# Patient Record
Sex: MALE | Employment: FULL TIME | URBAN - METROPOLITAN AREA
[De-identification: names, ages, dates, MRNs, and addresses within clinical notes are randomized per-mention and may not be internally consistent; named-entity substitution may affect disease eponyms.]

---

## 2023-06-30 ENCOUNTER — APPOINTMENT (OUTPATIENT)
Dept: GENERAL RADIOLOGY | Age: 48
End: 2023-06-30
Payer: COMMERCIAL

## 2023-06-30 ENCOUNTER — HOSPITAL ENCOUNTER (EMERGENCY)
Age: 48
Discharge: HOME | End: 2023-06-30
Attending: EMERGENCY MEDICINE
Payer: COMMERCIAL

## 2023-06-30 VITALS
OXYGEN SATURATION: 99 % | WEIGHT: 165 LBS | HEART RATE: 91 BPM | HEIGHT: 68 IN | SYSTOLIC BLOOD PRESSURE: 168 MMHG | DIASTOLIC BLOOD PRESSURE: 98 MMHG | BODY MASS INDEX: 25.01 KG/M2 | RESPIRATION RATE: 14 BRPM | TEMPERATURE: 98.1 F

## 2023-06-30 DIAGNOSIS — R00.0 TACHYCARDIA: Primary | ICD-10-CM

## 2023-06-30 DIAGNOSIS — F41.1 ANXIETY STATE: ICD-10-CM

## 2023-06-30 LAB
ALBUMIN SERPL-MCNC: 4.8 G/DL (ref 3.4–5)
ALBUMIN/GLOB SERPL: 1.7 {RATIO} (ref 1.1–2.2)
ALP SERPL-CCNC: 58 U/L (ref 40–129)
ALT SERPL-CCNC: 20 U/L (ref 10–40)
ANION GAP SERPL CALCULATED.3IONS-SCNC: 12 MMOL/L (ref 3–16)
AST SERPL-CCNC: 20 U/L (ref 15–37)
BASOPHILS # BLD: 0.1 K/UL (ref 0–0.2)
BASOPHILS NFR BLD: 1.1 %
BILIRUB SERPL-MCNC: 0.3 MG/DL (ref 0–1)
BUN SERPL-MCNC: 10 MG/DL (ref 7–20)
CALCIUM SERPL-MCNC: 9.3 MG/DL (ref 8.3–10.6)
CHLORIDE SERPL-SCNC: 102 MMOL/L (ref 99–110)
CO2 SERPL-SCNC: 24 MMOL/L (ref 21–32)
CREAT SERPL-MCNC: 0.7 MG/DL (ref 0.9–1.3)
D DIMER: <0.27 UG/ML FEU (ref 0–0.6)
DEPRECATED RDW RBC AUTO: 13.4 % (ref 12.4–15.4)
EOSINOPHIL # BLD: 0.2 K/UL (ref 0–0.6)
EOSINOPHIL NFR BLD: 2.6 %
GFR SERPLBLD CREATININE-BSD FMLA CKD-EPI: >60 ML/MIN/{1.73_M2}
GLUCOSE SERPL-MCNC: 171 MG/DL (ref 70–99)
HCT VFR BLD AUTO: 44.5 % (ref 40.5–52.5)
HGB BLD-MCNC: 15.1 G/DL (ref 13.5–17.5)
LYMPHOCYTES # BLD: 3.1 K/UL (ref 1–5.1)
LYMPHOCYTES NFR BLD: 38.8 %
MCH RBC QN AUTO: 30.2 PG (ref 26–34)
MCHC RBC AUTO-ENTMCNC: 34 G/DL (ref 31–36)
MCV RBC AUTO: 88.8 FL (ref 80–100)
MONOCYTES # BLD: 0.5 K/UL (ref 0–1.3)
MONOCYTES NFR BLD: 5.9 %
NEUTROPHILS # BLD: 4.1 K/UL (ref 1.7–7.7)
NEUTROPHILS NFR BLD: 51.6 %
PLATELET # BLD AUTO: 257 K/UL (ref 135–450)
PMV BLD AUTO: 7.1 FL (ref 5–10.5)
POTASSIUM SERPL-SCNC: 3.8 MMOL/L (ref 3.5–5.1)
PROT SERPL-MCNC: 7.7 G/DL (ref 6.4–8.2)
RBC # BLD AUTO: 5 M/UL (ref 4.2–5.9)
SODIUM SERPL-SCNC: 138 MMOL/L (ref 136–145)
TROPONIN, HIGH SENSITIVITY: <6 NG/L (ref 0–22)
TROPONIN, HIGH SENSITIVITY: <6 NG/L (ref 0–22)
WBC # BLD AUTO: 8 K/UL (ref 4–11)

## 2023-06-30 PROCEDURE — 96374 THER/PROPH/DIAG INJ IV PUSH: CPT

## 2023-06-30 PROCEDURE — 36415 COLL VENOUS BLD VENIPUNCTURE: CPT

## 2023-06-30 PROCEDURE — 96361 HYDRATE IV INFUSION ADD-ON: CPT

## 2023-06-30 PROCEDURE — 6360000002 HC RX W HCPCS: Performed by: EMERGENCY MEDICINE

## 2023-06-30 PROCEDURE — 80053 COMPREHEN METABOLIC PANEL: CPT

## 2023-06-30 PROCEDURE — 85025 COMPLETE CBC W/AUTO DIFF WBC: CPT

## 2023-06-30 PROCEDURE — 85379 FIBRIN DEGRADATION QUANT: CPT

## 2023-06-30 PROCEDURE — 93005 ELECTROCARDIOGRAM TRACING: CPT | Performed by: EMERGENCY MEDICINE

## 2023-06-30 PROCEDURE — 99285 EMERGENCY DEPT VISIT HI MDM: CPT

## 2023-06-30 PROCEDURE — 71046 X-RAY EXAM CHEST 2 VIEWS: CPT

## 2023-06-30 PROCEDURE — 84484 ASSAY OF TROPONIN QUANT: CPT

## 2023-06-30 PROCEDURE — 2580000003 HC RX 258: Performed by: EMERGENCY MEDICINE

## 2023-06-30 RX ORDER — 0.9 % SODIUM CHLORIDE 0.9 %
1000 INTRAVENOUS SOLUTION INTRAVENOUS ONCE
Status: COMPLETED | OUTPATIENT
Start: 2023-06-30 | End: 2023-06-30

## 2023-06-30 RX ORDER — HYDROXYZINE PAMOATE 25 MG/1
25 CAPSULE ORAL 3 TIMES DAILY PRN
Qty: 30 CAPSULE | Refills: 0 | Status: SHIPPED | OUTPATIENT
Start: 2023-06-30 | End: 2023-07-14

## 2023-06-30 RX ORDER — LORAZEPAM 2 MG/ML
1 INJECTION INTRAMUSCULAR ONCE
Status: COMPLETED | OUTPATIENT
Start: 2023-06-30 | End: 2023-06-30

## 2023-06-30 RX ADMIN — LORAZEPAM 1 MG: 2 INJECTION INTRAMUSCULAR; INTRAVENOUS at 09:43

## 2023-06-30 RX ADMIN — SODIUM CHLORIDE 1000 ML: 9 INJECTION, SOLUTION INTRAVENOUS at 09:43

## 2023-06-30 ASSESSMENT — ENCOUNTER SYMPTOMS
PHOTOPHOBIA: 0
VOMITING: 0
NAUSEA: 0
ABDOMINAL PAIN: 0
WHEEZING: 0
BLOOD IN STOOL: 0
VOICE CHANGE: 0
SHORTNESS OF BREATH: 0
TROUBLE SWALLOWING: 0
BACK PAIN: 0
COLOR CHANGE: 0
STRIDOR: 0
FACIAL SWELLING: 0

## 2023-06-30 ASSESSMENT — PAIN - FUNCTIONAL ASSESSMENT
PAIN_FUNCTIONAL_ASSESSMENT: NONE - DENIES PAIN
PAIN_FUNCTIONAL_ASSESSMENT: NONE - DENIES PAIN

## 2023-07-01 LAB
EKG ATRIAL RATE: 118 BPM
EKG DIAGNOSIS: NORMAL
EKG P AXIS: 57 DEGREES
EKG P-R INTERVAL: 156 MS
EKG Q-T INTERVAL: 326 MS
EKG QRS DURATION: 92 MS
EKG QTC CALCULATION (BAZETT): 456 MS
EKG R AXIS: 40 DEGREES
EKG T AXIS: 43 DEGREES
EKG VENTRICULAR RATE: 118 BPM

## 2023-07-01 PROCEDURE — 93010 ELECTROCARDIOGRAM REPORT: CPT | Performed by: INTERNAL MEDICINE

## 2023-07-10 ENCOUNTER — TELEPHONE (OUTPATIENT)
Dept: PRIMARY CARE CLINIC | Age: 48
End: 2023-07-10

## 2023-11-04 ENCOUNTER — HOSPITAL ENCOUNTER (EMERGENCY)
Age: 48
Discharge: HOME | End: 2023-11-04
Payer: COMMERCIAL

## 2023-11-04 ENCOUNTER — APPOINTMENT (OUTPATIENT)
Dept: GENERAL RADIOLOGY | Age: 48
End: 2023-11-04
Payer: COMMERCIAL

## 2023-11-04 VITALS
WEIGHT: 150 LBS | TEMPERATURE: 98.3 F | RESPIRATION RATE: 10 BRPM | DIASTOLIC BLOOD PRESSURE: 97 MMHG | OXYGEN SATURATION: 99 % | BODY MASS INDEX: 23.54 KG/M2 | HEART RATE: 78 BPM | HEIGHT: 67 IN | SYSTOLIC BLOOD PRESSURE: 158 MMHG

## 2023-11-04 DIAGNOSIS — R00.0 TACHYCARDIA: ICD-10-CM

## 2023-11-04 DIAGNOSIS — I10 UNCONTROLLED HYPERTENSION: Primary | ICD-10-CM

## 2023-11-04 DIAGNOSIS — I49.3 UNIFOCAL PVCS: ICD-10-CM

## 2023-11-04 LAB
ALBUMIN SERPL-MCNC: 4.7 G/DL (ref 3.4–5)
ALBUMIN/GLOB SERPL: 1.5 {RATIO} (ref 1.1–2.2)
ALP SERPL-CCNC: 43 U/L (ref 40–129)
ALT SERPL-CCNC: 15 U/L (ref 10–40)
AMPHETAMINES UR QL SCN>1000 NG/ML: NORMAL
ANION GAP SERPL CALCULATED.3IONS-SCNC: 14 MMOL/L (ref 3–16)
AST SERPL-CCNC: 14 U/L (ref 15–37)
BARBITURATES UR QL SCN>200 NG/ML: NORMAL
BASOPHILS # BLD: 0.1 K/UL (ref 0–0.2)
BASOPHILS NFR BLD: 0.7 %
BENZODIAZ UR QL SCN>200 NG/ML: NORMAL
BILIRUB SERPL-MCNC: 0.7 MG/DL (ref 0–1)
BILIRUB UR QL STRIP.AUTO: NEGATIVE
BUN SERPL-MCNC: 14 MG/DL (ref 7–20)
CALCIUM SERPL-MCNC: 9.4 MG/DL (ref 8.3–10.6)
CANNABINOIDS UR QL SCN>50 NG/ML: NORMAL
CHLORIDE SERPL-SCNC: 98 MMOL/L (ref 99–110)
CLARITY UR: CLEAR
CO2 SERPL-SCNC: 23 MMOL/L (ref 21–32)
COCAINE UR QL SCN: NORMAL
COLOR UR: ABNORMAL
CREAT SERPL-MCNC: 0.8 MG/DL (ref 0.9–1.3)
D DIMER: <0.27 UG/ML FEU (ref 0–0.6)
DEPRECATED RDW RBC AUTO: 13.4 % (ref 12.4–15.4)
DRUG SCREEN COMMENT UR-IMP: NORMAL
EKG ATRIAL RATE: 118 BPM
EKG DIAGNOSIS: NORMAL
EKG P AXIS: 61 DEGREES
EKG P-R INTERVAL: 160 MS
EKG Q-T INTERVAL: 336 MS
EKG QRS DURATION: 94 MS
EKG QTC CALCULATION (BAZETT): 470 MS
EKG R AXIS: 52 DEGREES
EKG T AXIS: 27 DEGREES
EKG VENTRICULAR RATE: 118 BPM
EOSINOPHIL # BLD: 0 K/UL (ref 0–0.6)
EOSINOPHIL NFR BLD: 0.6 %
FENTANYL SCREEN, URINE: NORMAL
GFR SERPLBLD CREATININE-BSD FMLA CKD-EPI: >60 ML/MIN/{1.73_M2}
GLUCOSE SERPL-MCNC: 104 MG/DL (ref 70–99)
GLUCOSE UR STRIP.AUTO-MCNC: NEGATIVE MG/DL
HCT VFR BLD AUTO: 45 % (ref 40.5–52.5)
HGB BLD-MCNC: 15 G/DL (ref 13.5–17.5)
HGB UR QL STRIP.AUTO: NEGATIVE
INR PPP: 0.97 (ref 0.84–1.16)
KETONES UR STRIP.AUTO-MCNC: ABNORMAL MG/DL
LEUKOCYTE ESTERASE UR QL STRIP.AUTO: NEGATIVE
LYMPHOCYTES # BLD: 1.9 K/UL (ref 1–5.1)
LYMPHOCYTES NFR BLD: 24.3 %
MAGNESIUM SERPL-MCNC: 1.9 MG/DL (ref 1.8–2.4)
MCH RBC QN AUTO: 29.6 PG (ref 26–34)
MCHC RBC AUTO-ENTMCNC: 33.4 G/DL (ref 31–36)
MCV RBC AUTO: 88.4 FL (ref 80–100)
METHADONE UR QL SCN>300 NG/ML: NORMAL
MONOCYTES # BLD: 0.3 K/UL (ref 0–1.3)
MONOCYTES NFR BLD: 4.1 %
NEUTROPHILS # BLD: 5.4 K/UL (ref 1.7–7.7)
NEUTROPHILS NFR BLD: 70.3 %
NITRITE UR QL STRIP.AUTO: NEGATIVE
NT-PROBNP SERPL-MCNC: 43 PG/ML (ref 0–124)
OPIATES UR QL SCN>300 NG/ML: NORMAL
OXYCODONE UR QL SCN: NORMAL
PCP UR QL SCN>25 NG/ML: NORMAL
PH UR STRIP.AUTO: 6 [PH] (ref 5–8)
PH UR STRIP: 6 [PH]
PLATELET # BLD AUTO: 255 K/UL (ref 135–450)
PMV BLD AUTO: 7.3 FL (ref 5–10.5)
POTASSIUM SERPL-SCNC: 3.6 MMOL/L (ref 3.5–5.1)
PROT SERPL-MCNC: 7.8 G/DL (ref 6.4–8.2)
PROT UR STRIP.AUTO-MCNC: NEGATIVE MG/DL
PROTHROMBIN TIME: 12.9 SEC (ref 11.5–14.8)
RBC # BLD AUTO: 5.09 M/UL (ref 4.2–5.9)
SODIUM SERPL-SCNC: 135 MMOL/L (ref 136–145)
SP GR UR STRIP.AUTO: <=1.005 (ref 1–1.03)
T4 FREE SERPL-MCNC: 1.3 NG/DL (ref 0.9–1.8)
TROPONIN, HIGH SENSITIVITY: <6 NG/L (ref 0–22)
TSH SERPL DL<=0.005 MIU/L-ACNC: 1.01 UIU/ML (ref 0.27–4.2)
UA COMPLETE W REFLEX CULTURE PNL UR: ABNORMAL
UA DIPSTICK W REFLEX MICRO PNL UR: ABNORMAL
URN SPEC COLLECT METH UR: ABNORMAL
UROBILINOGEN UR STRIP-ACNC: 0.2 E.U./DL
WBC # BLD AUTO: 7.8 K/UL (ref 4–11)

## 2023-11-04 PROCEDURE — 93005 ELECTROCARDIOGRAM TRACING: CPT | Performed by: EMERGENCY MEDICINE

## 2023-11-04 PROCEDURE — 81003 URINALYSIS AUTO W/O SCOPE: CPT

## 2023-11-04 PROCEDURE — 84443 ASSAY THYROID STIM HORMONE: CPT

## 2023-11-04 PROCEDURE — 85610 PROTHROMBIN TIME: CPT

## 2023-11-04 PROCEDURE — 80053 COMPREHEN METABOLIC PANEL: CPT

## 2023-11-04 PROCEDURE — 85025 COMPLETE CBC W/AUTO DIFF WBC: CPT

## 2023-11-04 PROCEDURE — 99285 EMERGENCY DEPT VISIT HI MDM: CPT

## 2023-11-04 PROCEDURE — 83880 ASSAY OF NATRIURETIC PEPTIDE: CPT

## 2023-11-04 PROCEDURE — 6370000000 HC RX 637 (ALT 250 FOR IP): Performed by: PHYSICIAN ASSISTANT

## 2023-11-04 PROCEDURE — 2580000003 HC RX 258: Performed by: PHYSICIAN ASSISTANT

## 2023-11-04 PROCEDURE — 85379 FIBRIN DEGRADATION QUANT: CPT

## 2023-11-04 PROCEDURE — 71045 X-RAY EXAM CHEST 1 VIEW: CPT

## 2023-11-04 PROCEDURE — 83735 ASSAY OF MAGNESIUM: CPT

## 2023-11-04 PROCEDURE — 93010 ELECTROCARDIOGRAM REPORT: CPT | Performed by: INTERNAL MEDICINE

## 2023-11-04 PROCEDURE — 84439 ASSAY OF FREE THYROXINE: CPT

## 2023-11-04 PROCEDURE — 84484 ASSAY OF TROPONIN QUANT: CPT

## 2023-11-04 PROCEDURE — 80307 DRUG TEST PRSMV CHEM ANLYZR: CPT

## 2023-11-04 RX ORDER — METOPROLOL SUCCINATE 50 MG/1
50 TABLET, EXTENDED RELEASE ORAL EVERY EVENING
Qty: 30 TABLET | Refills: 0 | Status: SHIPPED | OUTPATIENT
Start: 2023-11-04

## 2023-11-04 RX ORDER — 0.9 % SODIUM CHLORIDE 0.9 %
1000 INTRAVENOUS SOLUTION INTRAVENOUS ONCE
Status: COMPLETED | OUTPATIENT
Start: 2023-11-04 | End: 2023-11-04

## 2023-11-04 RX ADMIN — SODIUM CHLORIDE 1000 ML: 9 INJECTION, SOLUTION INTRAVENOUS at 09:11

## 2023-11-04 RX ADMIN — METOPROLOL TARTRATE 50 MG: 25 TABLET, FILM COATED ORAL at 09:12

## 2023-11-04 ASSESSMENT — PAIN - FUNCTIONAL ASSESSMENT: PAIN_FUNCTIONAL_ASSESSMENT: 0-10

## 2023-11-04 ASSESSMENT — PAIN SCALES - GENERAL: PAINLEVEL_OUTOF10: 0

## 2023-11-04 NOTE — ED PROVIDER NOTES
[address] [place] [address]  ED  EMERGENCY DEPARTMENT ENCOUNTER        Pt Name: Nahun Mock  MRN: [id number]  [address] [facility] [place] 1975  Date of evaluation: 11/4/2023  Provider: Aleksandra Sierra PA-C  PCP: No primary care provider on file. Note Started: 9:15 AM EDT 11/4/23      HANANE. I have evaluated this patient. CHIEF COMPLAINT       Chief Complaint   Patient presents with    Tachycardia     Started \"not feel so good\" when got to work this morning, upset stomach, has been seen in ER for same thing but never cardiology        HISTORY OF PRESENT ILLNESS: 1 or more Elements     History From: Patient    Nahun Mock is a 46 y.o. male who presents to the emergency department with complaint tachycardia. The patient has had tachycardia in the past.  The patient works at [employer]. Starts work at 2 AM.  It was 7 AM or about 2 and half hours before coming to ED that he noticed increased heart rate. There was no flip-flop of the heart beat but simply tachycardia as reported by the patient. He states it might take his breath away when its fast.  Patient does have known history of tachycardia syndromes as well as hypertension. He does not follow with cardiology or PCP. He is not treated with medication at this time. He reports no chest pain or shortness of breath. No gastrointestinal or urinary complaints. The patient's initial heart rate is 120. It seems to vary between 100 up to 120. The patient initial BP is 175/97 at a recheck 180/101. Patient does report having seen a cardiologist in [facility] with a negative echo. I do find the echo study date 8/25/2011. I do see the referring provider as Alex rBush NP. I do not find any notes specific to an office visit. Nursing Notes were all reviewed and agreed with or any disagreements were addressed in the HPI. REVIEW OF SYSTEMS :      Review of Systems    Positives and Pertinent negatives as per HPI.      SURGICAL HISTORY included for SEP-1 Core Measure due to: Infection is not suspected    Chronic Conditions affecting care:    has no past medical history on file. CONSULTS: (Who and What was discussed)  None    Records Reviewed (External and Source) none    CC/HPI Summary, DDx, ED Course, and Reassessment:     Patient presenting from work. Works at [employer]. Increased heart rate. Presenting with 120. Blood pressure elevated. Patient with history of both and previously seen for both. Referred to PCP but did not go. Not on medication currently. I did obtain laboratory studies showing UDS negative, UA negative, CBC showing a WBC of 7.8 hemoglobin 15.5. Troponin, BNP and D-dimer not elevated. Renal function normal.  Chest x-ray showing no acute cardiopulmonary abnormality. Patient's TFTs are pending at this time. EKG showing sinus tachycardia otherwise no acute pathology appreciated. The patient did receive metoprolol tartrate 50 mg. Heart rate at discharge 78. Blood pressure at discharge 158/97. Disposition Considerations (tests considered but not done, Admit vs D/C, Shared Decision Making, Pt Expectation of Test or Tx.):     Patient safe for discharged home with diagnosis of tachycardia, PVCs and uncontrolled hypertension. Patient was given metoprolol tartrate 50 mg. Patient's symptoms did improve. Patient feeling improved from a clinical standpoint. He will be discharged to home. He will follow-up with cardiology Dr. Frances Morales. He will also be followed by PCP within the [facility] system. He lives in [facility]. I did send prescription to [facility] for Toprol-XL 50 mg #30x0. Patient is aware that he needs to continue with treatment. He does indicate father history hypertension. I am the Primary Clinician of Record. FINAL IMPRESSION      1. Uncontrolled hypertension    2. Tachycardia    3.  Unifocal PVCs          DISPOSITION/PLAN     DISPOSITION Decision To

## 2023-11-04 NOTE — DISCHARGE INSTRUCTIONS
You did present with initial heart rate of 120. You had some occasional PVCs at 1 point bigeminal PVCs. Initial blood pressure 168/103. Toward discharge 158/97. Heart rate at discharge 78. I did give you metoprolol titrate 50 mg.  I have sent prescription to your local pharmacy for metoprolol succinate 50 mg take 1 each evening beginning tonight. You will need to see cardiology Dr. Theo Chao for further evaluation as well as a PCP or primary care provider.

## 2023-11-07 ENCOUNTER — HOSPITAL ENCOUNTER (EMERGENCY)
Age: 48
Discharge: HOME | End: 2023-11-07
Attending: EMERGENCY MEDICINE
Payer: COMMERCIAL

## 2023-11-07 VITALS
RESPIRATION RATE: 18 BRPM | SYSTOLIC BLOOD PRESSURE: 172 MMHG | OXYGEN SATURATION: 99 % | TEMPERATURE: 98.4 F | DIASTOLIC BLOOD PRESSURE: 94 MMHG | HEART RATE: 76 BPM

## 2023-11-07 DIAGNOSIS — R20.0 NUMBNESS OF RIGHT FOOT: ICD-10-CM

## 2023-11-07 DIAGNOSIS — F41.1 ANXIETY STATE: ICD-10-CM

## 2023-11-07 DIAGNOSIS — R01.1 CARDIAC MURMUR: ICD-10-CM

## 2023-11-07 DIAGNOSIS — R00.2 PALPITATIONS: Primary | ICD-10-CM

## 2023-11-07 DIAGNOSIS — I49.8 BIGEMINY: ICD-10-CM

## 2023-11-07 LAB
ALBUMIN SERPL-MCNC: 4.6 G/DL (ref 3.4–5)
ALBUMIN/GLOB SERPL: 1.3 {RATIO} (ref 1.1–2.2)
ALP SERPL-CCNC: 47 U/L (ref 40–129)
ALT SERPL-CCNC: 12 U/L (ref 10–40)
ANION GAP SERPL CALCULATED.3IONS-SCNC: 13 MMOL/L (ref 3–16)
AST SERPL-CCNC: 16 U/L (ref 15–37)
BASOPHILS # BLD: 0.1 K/UL (ref 0–0.2)
BASOPHILS NFR BLD: 0.8 %
BILIRUB SERPL-MCNC: 1.1 MG/DL (ref 0–1)
BUN SERPL-MCNC: 8 MG/DL (ref 7–20)
CALCIUM SERPL-MCNC: 9.6 MG/DL (ref 8.3–10.6)
CHLORIDE SERPL-SCNC: 101 MMOL/L (ref 99–110)
CO2 SERPL-SCNC: 26 MMOL/L (ref 21–32)
CREAT SERPL-MCNC: 0.7 MG/DL (ref 0.9–1.3)
DEPRECATED RDW RBC AUTO: 13.2 % (ref 12.4–15.4)
EOSINOPHIL # BLD: 0.1 K/UL (ref 0–0.6)
EOSINOPHIL NFR BLD: 0.8 %
GFR SERPLBLD CREATININE-BSD FMLA CKD-EPI: >60 ML/MIN/{1.73_M2}
GLUCOSE SERPL-MCNC: 103 MG/DL (ref 70–99)
HCT VFR BLD AUTO: 43.4 % (ref 40.5–52.5)
HGB BLD-MCNC: 14.4 G/DL (ref 13.5–17.5)
LYMPHOCYTES # BLD: 2.1 K/UL (ref 1–5.1)
LYMPHOCYTES NFR BLD: 25.7 %
MAGNESIUM SERPL-MCNC: 2.1 MG/DL (ref 1.8–2.4)
MCH RBC QN AUTO: 29.4 PG (ref 26–34)
MCHC RBC AUTO-ENTMCNC: 33.2 G/DL (ref 31–36)
MCV RBC AUTO: 88.7 FL (ref 80–100)
MONOCYTES # BLD: 0.5 K/UL (ref 0–1.3)
MONOCYTES NFR BLD: 6.3 %
NEUTROPHILS # BLD: 5.5 K/UL (ref 1.7–7.7)
NEUTROPHILS NFR BLD: 66.4 %
NT-PROBNP SERPL-MCNC: 116 PG/ML (ref 0–124)
PLATELET # BLD AUTO: 294 K/UL (ref 135–450)
PMV BLD AUTO: 7.9 FL (ref 5–10.5)
POTASSIUM SERPL-SCNC: 4 MMOL/L (ref 3.5–5.1)
PROT SERPL-MCNC: 8.2 G/DL (ref 6.4–8.2)
RBC # BLD AUTO: 4.89 M/UL (ref 4.2–5.9)
SODIUM SERPL-SCNC: 140 MMOL/L (ref 136–145)
TROPONIN, HIGH SENSITIVITY: 6 NG/L (ref 0–22)
TROPONIN, HIGH SENSITIVITY: <6 NG/L (ref 0–22)
WBC # BLD AUTO: 8.2 K/UL (ref 4–11)

## 2023-11-07 PROCEDURE — 84484 ASSAY OF TROPONIN QUANT: CPT

## 2023-11-07 PROCEDURE — 83735 ASSAY OF MAGNESIUM: CPT

## 2023-11-07 PROCEDURE — 80053 COMPREHEN METABOLIC PANEL: CPT

## 2023-11-07 PROCEDURE — 6370000000 HC RX 637 (ALT 250 FOR IP): Performed by: EMERGENCY MEDICINE

## 2023-11-07 PROCEDURE — 83880 ASSAY OF NATRIURETIC PEPTIDE: CPT

## 2023-11-07 PROCEDURE — 99284 EMERGENCY DEPT VISIT MOD MDM: CPT

## 2023-11-07 PROCEDURE — 93005 ELECTROCARDIOGRAM TRACING: CPT | Performed by: EMERGENCY MEDICINE

## 2023-11-07 PROCEDURE — 85025 COMPLETE CBC W/AUTO DIFF WBC: CPT

## 2023-11-07 RX ORDER — HYDROXYZINE PAMOATE 25 MG/1
50 CAPSULE ORAL ONCE
Status: COMPLETED | OUTPATIENT
Start: 2023-11-07 | End: 2023-11-07

## 2023-11-07 RX ORDER — HYDROXYZINE PAMOATE 25 MG/1
25 CAPSULE ORAL
Qty: 5 CAPSULE | Refills: 0 | Status: SHIPPED | OUTPATIENT
Start: 2023-11-07 | End: 2023-11-09 | Stop reason: ALTCHOICE

## 2023-11-07 RX ADMIN — HYDROXYZINE PAMOATE 50 MG: 25 CAPSULE ORAL at 18:26

## 2023-11-07 ASSESSMENT — PAIN - FUNCTIONAL ASSESSMENT: PAIN_FUNCTIONAL_ASSESSMENT: 0-10

## 2023-11-07 ASSESSMENT — PAIN SCALES - GENERAL: PAINLEVEL_OUTOF10: 0

## 2023-11-07 NOTE — ED PROVIDER NOTES
MEDICATIONS:  Discharge Medication List as of 11/7/2023  8:08 PM                 (Please note that portions of this note were completed with a voicerecognition program.  Efforts were made to edit the dictations but occasionally words are mis-transcribed.)    Xiang Varela MD (electronically signed)            Xiang Varela MD  11/08/23 [id number]

## 2023-11-08 LAB
EKG ATRIAL RATE: 93 BPM
EKG DIAGNOSIS: NORMAL
EKG P AXIS: 71 DEGREES
EKG P-R INTERVAL: 134 MS
EKG Q-T INTERVAL: 376 MS
EKG QRS DURATION: 96 MS
EKG QTC CALCULATION (BAZETT): 467 MS
EKG R AXIS: 75 DEGREES
EKG T AXIS: 70 DEGREES
EKG VENTRICULAR RATE: 93 BPM

## 2023-11-08 PROCEDURE — 93010 ELECTROCARDIOGRAM REPORT: CPT | Performed by: INTERNAL MEDICINE

## 2023-11-08 ASSESSMENT — ENCOUNTER SYMPTOMS
SHORTNESS OF BREATH: 0
ABDOMINAL PAIN: 0
VOMITING: 0
NAUSEA: 0
CHEST TIGHTNESS: 0
COUGH: 0
BACK PAIN: 0

## 2023-11-08 ASSESSMENT — SOCIAL DETERMINANTS OF HEALTH (SDOH)

## 2023-11-08 NOTE — ED NOTES
Discharge instructions reviewed with Pt. Pt verbalizes understanding at this time. Prescriptions/medications reviewed with pt at this time. VS as noted. Pt condition stable at this time. No concerns voiced.        Ata Baez RN  11/07/23 2012

## 2023-11-08 NOTE — DISCHARGE INSTRUCTIONS
Continue taking metoprolol as prescribed. Stay hydrated. Call cardiology tomorrow for further evaluation. Keep your primary doctor appointment in 2 days as scheduled. See if cardiology can move up your appointment to this week. Return to the emergency department for associated lightheadedness with feeling like you could pass out, development of chest pain with shortness of breath, any new numbness or weakness on one side of the body, or any other concerns over the next 12 to 24 hours. Make sure to see about getting a Holter monitor for your palpitations along with an echocardiogram due to concern for heart murmur.

## 2023-11-09 ENCOUNTER — OFFICE VISIT (OUTPATIENT)
Dept: FAMILY MEDICINE CLINIC | Age: 48
End: 2023-11-09
Payer: COMMERCIAL

## 2023-11-09 VITALS
HEIGHT: 67 IN | DIASTOLIC BLOOD PRESSURE: 97 MMHG | OXYGEN SATURATION: 99 % | HEART RATE: 77 BPM | WEIGHT: 153.4 LBS | SYSTOLIC BLOOD PRESSURE: 166 MMHG | BODY MASS INDEX: 24.08 KG/M2

## 2023-11-09 DIAGNOSIS — I10 HYPERTENSION, UNSPECIFIED TYPE: ICD-10-CM

## 2023-11-09 DIAGNOSIS — Z12.11 COLON CANCER SCREENING: ICD-10-CM

## 2023-11-09 DIAGNOSIS — F41.9 ANXIETY: Primary | ICD-10-CM

## 2023-11-09 DIAGNOSIS — I47.9 TACHYCARDIA, PAROXYSMAL (HCC): ICD-10-CM

## 2023-11-09 LAB
EKG ATRIAL RATE: 88 BPM
EKG DIAGNOSIS: NORMAL
EKG P AXIS: 69 DEGREES
EKG P-R INTERVAL: 134 MS
EKG Q-T INTERVAL: 386 MS
EKG QRS DURATION: 94 MS
EKG QTC CALCULATION (BAZETT): 467 MS
EKG R AXIS: 73 DEGREES
EKG T AXIS: 56 DEGREES
EKG VENTRICULAR RATE: 88 BPM

## 2023-11-09 PROCEDURE — 93010 ELECTROCARDIOGRAM REPORT: CPT | Performed by: INTERNAL MEDICINE

## 2023-11-09 PROCEDURE — 99204 OFFICE O/P NEW MOD 45 MIN: CPT | Performed by: SURGERY

## 2023-11-09 PROCEDURE — 3077F SYST BP >= 140 MM HG: CPT | Performed by: SURGERY

## 2023-11-09 PROCEDURE — 3080F DIAST BP >= 90 MM HG: CPT | Performed by: SURGERY

## 2023-11-09 RX ORDER — ESCITALOPRAM OXALATE 10 MG/1
10 TABLET ORAL DAILY
Qty: 30 TABLET | Refills: 0 | Status: SHIPPED | OUTPATIENT
Start: 2023-11-09

## 2023-11-09 SDOH — ECONOMIC STABILITY: INCOME INSECURITY: HOW HARD IS IT FOR YOU TO PAY FOR THE VERY BASICS LIKE FOOD, HOUSING, MEDICAL CARE, AND HEATING?: NOT HARD AT ALL

## 2023-11-09 SDOH — ECONOMIC STABILITY: HOUSING INSECURITY
IN THE LAST 12 MONTHS, WAS THERE A TIME WHEN YOU DID NOT HAVE A STEADY PLACE TO SLEEP OR SLEPT IN A SHELTER (INCLUDING NOW)?: NO

## 2023-11-09 SDOH — ECONOMIC STABILITY: FOOD INSECURITY: WITHIN THE PAST 12 MONTHS, YOU WORRIED THAT YOUR FOOD WOULD RUN OUT BEFORE YOU GOT MONEY TO BUY MORE.: NEVER TRUE

## 2023-11-09 SDOH — ECONOMIC STABILITY: FOOD INSECURITY: WITHIN THE PAST 12 MONTHS, THE FOOD YOU BOUGHT JUST DIDN'T LAST AND YOU DIDN'T HAVE MONEY TO GET MORE.: NEVER TRUE

## 2023-11-09 ASSESSMENT — ENCOUNTER SYMPTOMS
COUGH: 0
SHORTNESS OF BREATH: 0
ABDOMINAL PAIN: 0
DIARRHEA: 0
NAUSEA: 0
CONSTIPATION: 0
SORE THROAT: 0

## 2023-11-09 ASSESSMENT — PATIENT HEALTH QUESTIONNAIRE - PHQ9
1. LITTLE INTEREST OR PLEASURE IN DOING THINGS: 0
SUM OF ALL RESPONSES TO PHQ QUESTIONS 1-9: 0
SUM OF ALL RESPONSES TO PHQ QUESTIONS 1-9: 0
SUM OF ALL RESPONSES TO PHQ9 QUESTIONS 1 & 2: 0
SUM OF ALL RESPONSES TO PHQ QUESTIONS 1-9: 0
SUM OF ALL RESPONSES TO PHQ QUESTIONS 1-9: 0
2. FEELING DOWN, DEPRESSED OR HOPELESS: 0

## 2023-11-09 NOTE — PROGRESS NOTES
his trigger for tachy episodes  - he has a lot of anxiety  - we will trial lexapro due to the mild side effect profile and minimal dosing range    Tachycardia and HTN  - keep appointment with cardio on Monday  - continue metoprolol    F/u in 1 month    Orders Placed This Encounter   Procedures    COLONOSCOPY W/ OR W/O BIOPSY     Order Specific Question:   Screening or Diagnostic? Answer:   Screening       Orders Placed This Encounter   Medications    escitalopram (LEXAPRO) 10 MG tablet     Sig: Take 1 tablet by mouth daily     Dispense:  30 tablet     Refill:  0       Return in about 1 month (around 12/9/2023).     [employer]), DO

## 2023-11-10 PROBLEM — I10 HYPERTENSION: Status: ACTIVE | Noted: 2023-11-10

## 2023-11-10 PROBLEM — K76.0 FATTY LIVER: Status: ACTIVE | Noted: 2023-11-10

## 2023-11-10 PROBLEM — R00.0 TACHYCARDIA: Status: ACTIVE | Noted: 2023-11-10

## 2023-11-10 NOTE — PROGRESS NOTES
CARDIOLOGY CONSULTATION      Patient Name: Al Fierro  Primary Care physician: No primary care provider on file. Reason for Referral/Chief Complaint: Al Fierro is a 46 y.o. patient who is referred to cardiology clinic today for evaluation and treatment of palpitations. Chief Complaint   Patient presents with    New Patient    Hypertension    Fatigue      History of Present Illness:   Al Fierro is a 46 y.o. male with no significant medical history. Patient is referred to clinic after he presented to [facility] 11/4/23 and 11/7/23, for tachycardia and palpitations. He stated it will take his breath away when its fast.  He does report working from 2 AM to 10 AM on a regular basis and whenever he gets home he feels like the palpitations are worse. He was unable to sleep due to not feeling the greatest related to the palpitations. Initial heart rate was 120. With an average between 100-120. Initial blood pressure 175/97 with a recheck 180/101. Chest Xray showed no acute cardiopulmonary disease. Patient does have a known history of tachycardia as well as hypertension. Patient was started on Toprol XL 50 mg daily. Today, he states he is feeling tired. He states when he is working he has to take breaks because he is really fatigued. He states he does have anxiety. He did not notice associated symptoms when he had the heart palpitation and states he has been more fatigued since his first ED visit 11/4/23  and since starting beta blocker. Denies smoking, alcohol use or recreational drug use. Father had a CABG-50-60s? The patient denies chest pain, shortness of breath at rest and dyspnea with exertion. Denies palpitations, dizziness, near-syncope or mary anne syncope. Denies paroxysmal nocturnal dyspnea, orthopnea, bendopnea, increasing lower extremity edema or weight gain. The patient endorses highest level of activity as working at [employer].      Past Medical History:   has no past

## 2023-11-13 ENCOUNTER — TELEPHONE (OUTPATIENT)
Dept: CARDIOLOGY CLINIC | Age: 48
End: 2023-11-13

## 2023-11-13 ENCOUNTER — OFFICE VISIT (OUTPATIENT)
Dept: CARDIOLOGY CLINIC | Age: 48
End: 2023-11-13

## 2023-11-13 VITALS
WEIGHT: 155.2 LBS | HEIGHT: 67 IN | OXYGEN SATURATION: 98 % | DIASTOLIC BLOOD PRESSURE: 60 MMHG | BODY MASS INDEX: 24.36 KG/M2 | HEART RATE: 45 BPM | SYSTOLIC BLOOD PRESSURE: 110 MMHG

## 2023-11-13 DIAGNOSIS — R00.0 TACHYCARDIA: ICD-10-CM

## 2023-11-13 DIAGNOSIS — I15.9 SECONDARY HYPERTENSION: ICD-10-CM

## 2023-11-13 DIAGNOSIS — I49.9 IRREGULAR HEARTBEAT: Primary | ICD-10-CM

## 2023-11-13 DIAGNOSIS — K76.0 FATTY LIVER: ICD-10-CM

## 2023-11-13 DIAGNOSIS — I49.8 BIGEMINY: ICD-10-CM

## 2023-11-13 RX ORDER — DILTIAZEM HYDROCHLORIDE 120 MG/1
120 CAPSULE, COATED, EXTENDED RELEASE ORAL DAILY
Qty: 90 CAPSULE | Refills: 3 | Status: SHIPPED | OUTPATIENT
Start: 2023-11-13

## 2023-11-13 NOTE — PATIENT INSTRUCTIONS
Plan  Monitor your blood pressure at home twice a day, morning and night. Recommend a monitor for your bicep. Keep a log. Goal 130/80 or less. Recommend an echocardiogram which is an ultrasound of your heart to evaluate heart function, structures and valves. Stop taking your Metoprolol Succinate (Toprol XL). Start taking Diltiazem (Cardizem CD) 120 mg daily. This medication is used to treat angina, high blood pressure and irregular heart rates. Call the office should you experience any symptoms. We placed a two week VC to evaluate irregularities in your heart rate. Continue to hold you Lexapro. You can discuss this with your primary car provider. We will call you with the results. Follow up with me in     Your provider has ordered testing for further evaluation. An order/prescription has been included in your paper work. To schedule outpatient testing, contact Central Scheduling by calling -[facility] ([phone]).

## 2023-11-13 NOTE — TELEPHONE ENCOUNTER
Monitor placed by Michael [facility] [employer]  Length of [id number] days  Monitor ordered by Dr. Paul Areas  Phone [phone] [phone]  Activation successful prior to pt leaving office?  Yes

## 2023-12-07 ENCOUNTER — OFFICE VISIT (OUTPATIENT)
Dept: FAMILY MEDICINE CLINIC | Age: 48
End: 2023-12-07
Payer: COMMERCIAL

## 2023-12-07 VITALS
DIASTOLIC BLOOD PRESSURE: 84 MMHG | SYSTOLIC BLOOD PRESSURE: 185 MMHG | BODY MASS INDEX: 23.57 KG/M2 | HEIGHT: 67 IN | OXYGEN SATURATION: 98 % | WEIGHT: 150.2 LBS | HEART RATE: 85 BPM

## 2023-12-07 DIAGNOSIS — F41.9 ANXIETY: Primary | ICD-10-CM

## 2023-12-07 PROCEDURE — 99213 OFFICE O/P EST LOW 20 MIN: CPT | Performed by: SURGERY

## 2023-12-07 PROCEDURE — 3079F DIAST BP 80-89 MM HG: CPT | Performed by: SURGERY

## 2023-12-07 PROCEDURE — 3077F SYST BP >= 140 MM HG: CPT | Performed by: SURGERY

## 2023-12-07 RX ORDER — DULOXETIN HYDROCHLORIDE 30 MG/1
30 CAPSULE, DELAYED RELEASE ORAL DAILY
Qty: 90 CAPSULE | Refills: 1 | Status: SHIPPED | OUTPATIENT
Start: 2023-12-07

## 2023-12-07 ASSESSMENT — ENCOUNTER SYMPTOMS
ABDOMINAL PAIN: 0
NAUSEA: 0
DIARRHEA: 0
SORE THROAT: 0
SHORTNESS OF BREATH: 0
COUGH: 0
CONSTIPATION: 0

## 2023-12-07 NOTE — PROGRESS NOTES
12/7/2023    This is a 46 y.o. male   Chief Complaint   Patient presents with    Follow-up    Anxiety   . Here for f/u. Saw cardio after last visit and they told him not to take his anxiety medication. So he hasn't been using the lexapro, took it one time. We know his anxiety is a huge trigger for his symptoms - elevated BP and tachycardia when stresses or presented with new situations. We can change his medication to a less potential for QT prolongation while his cardiac workup remains underway. 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 He cut out caffeine completely since our last visit. Patient Active Problem List   Diagnosis    Fatty liver    Tachycardia    Hypertension    Anxiety       Current Outpatient Medications   Medication Sig Dispense Refill    DULoxetine (CYMBALTA) 30 MG extended release capsule Take 1 capsule by mouth daily 90 capsule 1    dilTIAZem (CARDIZEM CD) 120 MG extended release capsule Take 1 capsule by mouth daily 90 capsule 3     No current facility-administered medications for this visit. No Known Allergies    Social History     Tobacco Use    Smoking status: Never     Passive exposure: Never    Smokeless tobacco: Not on file   Substance Use Topics    Alcohol use: Not Currently     Comment: socially       Review of Systems   Constitutional:  Negative for chills and fever. HENT:  Negative for congestion, ear pain and sore throat. Respiratory:  Negative for cough and shortness of breath. Cardiovascular:  Positive for palpitations (with stress). Negative for chest pain and leg swelling. Gastrointestinal:  Negative for abdominal pain, constipation, diarrhea and nausea. Genitourinary:  Negative for difficulty urinating and hematuria. Neurological:  Negative for dizziness, tremors, syncope, numbness and headaches.

## 2023-12-11 ENCOUNTER — PROCEDURE VISIT (OUTPATIENT)
Dept: CARDIOLOGY CLINIC | Age: 48
End: 2023-12-11
Payer: COMMERCIAL

## 2023-12-11 DIAGNOSIS — I49.8 BIGEMINY: ICD-10-CM

## 2023-12-11 PROCEDURE — 93306 TTE W/DOPPLER COMPLETE: CPT | Performed by: INTERNAL MEDICINE

## 2023-12-15 ENCOUNTER — TELEPHONE (OUTPATIENT)
Dept: FAMILY MEDICINE CLINIC | Age: 48
End: 2023-12-15

## 2023-12-15 NOTE — TELEPHONE ENCOUNTER
Pt called wanting to speak with Dr Marin regarding some side effects he is having since starting the new dose of Cymbalta a week ago.     Sx: nausea, tired, no appetite    Please call back to discuss.

## 2023-12-26 ENCOUNTER — TELEPHONE (OUTPATIENT)
Dept: CARDIOLOGY CLINIC | Age: 48
End: 2023-12-26

## 2023-12-26 NOTE — TELEPHONE ENCOUNTER
----- Message from Slava Osborn, DO sent at 12/26/2023  9:51 AM EST -----  Please let patient know that his event monitor showed sinus rhythm with low PVC burden. The frequency appears much less than what see in the office last visit. This is likely from the diltiazem we started at last visit. He will need to have a HENRIETTA that we discussed to assess the degree of leakiness of his mitral valve, scheduled on 1/12/23 at 0800. Thanks.  AMP

## 2023-12-28 NOTE — TELEPHONE ENCOUNTER
Third and final attempt made to reach the patient to relay results message from AMP. Letter created and mailed to the patient. VM left with office number should patient call back. Closing encounter.

## 2024-01-12 ENCOUNTER — ANESTHESIA EVENT (OUTPATIENT)
Dept: CARDIAC CATH/INVASIVE PROCEDURES | Age: 49
End: 2024-01-12
Payer: COMMERCIAL

## 2024-01-12 ENCOUNTER — ANESTHESIA (OUTPATIENT)
Dept: CARDIAC CATH/INVASIVE PROCEDURES | Age: 49
End: 2024-01-12
Payer: COMMERCIAL

## 2024-01-12 ENCOUNTER — HOSPITAL ENCOUNTER (OUTPATIENT)
Dept: CARDIAC CATH/INVASIVE PROCEDURES | Age: 49
Discharge: HOME | End: 2024-01-12
Attending: STUDENT IN AN ORGANIZED HEALTH CARE EDUCATION/TRAINING PROGRAM | Admitting: STUDENT IN AN ORGANIZED HEALTH CARE EDUCATION/TRAINING PROGRAM
Payer: COMMERCIAL

## 2024-01-12 VITALS
OXYGEN SATURATION: 99 % | HEIGHT: 68 IN | HEART RATE: 92 BPM | SYSTOLIC BLOOD PRESSURE: 142 MMHG | RESPIRATION RATE: 17 BRPM | WEIGHT: 158 LBS | BODY MASS INDEX: 23.95 KG/M2 | DIASTOLIC BLOOD PRESSURE: 93 MMHG

## 2024-01-12 DIAGNOSIS — I34.1 MITRAL VALVE PROLAPSE: ICD-10-CM

## 2024-01-12 LAB
ANION GAP SERPL CALCULATED.3IONS-SCNC: 11 MMOL/L (ref 3–16)
BUN SERPL-MCNC: 9 MG/DL (ref 7–20)
CALCIUM SERPL-MCNC: 9.3 MG/DL (ref 8.3–10.6)
CHLORIDE SERPL-SCNC: 102 MMOL/L (ref 99–110)
CO2 SERPL-SCNC: 26 MMOL/L (ref 21–32)
CREAT SERPL-MCNC: 0.7 MG/DL (ref 0.9–1.3)
DEPRECATED RDW RBC AUTO: 13.1 % (ref 12.4–15.4)
EKG ATRIAL RATE: 86 BPM
EKG DIAGNOSIS: NORMAL
EKG P AXIS: 55 DEGREES
EKG P-R INTERVAL: 136 MS
EKG Q-T INTERVAL: 374 MS
EKG QRS DURATION: 92 MS
EKG QTC CALCULATION (BAZETT): 447 MS
EKG R AXIS: 56 DEGREES
EKG T AXIS: 39 DEGREES
EKG VENTRICULAR RATE: 86 BPM
GFR SERPLBLD CREATININE-BSD FMLA CKD-EPI: >60 ML/MIN/{1.73_M2}
GLUCOSE SERPL-MCNC: 101 MG/DL (ref 70–99)
HCT VFR BLD AUTO: 45.8 % (ref 40.5–52.5)
HGB BLD-MCNC: 15.4 G/DL (ref 13.5–17.5)
MCH RBC QN AUTO: 29.7 PG (ref 26–34)
MCHC RBC AUTO-ENTMCNC: 33.6 G/DL (ref 31–36)
MCV RBC AUTO: 88.4 FL (ref 80–100)
PLATELET # BLD AUTO: 289 K/UL (ref 135–450)
PMV BLD AUTO: 6.9 FL (ref 5–10.5)
POTASSIUM SERPL-SCNC: 3.8 MMOL/L (ref 3.5–5.1)
RBC # BLD AUTO: 5.19 M/UL (ref 4.2–5.9)
SODIUM SERPL-SCNC: 139 MMOL/L (ref 136–145)
WBC # BLD AUTO: 6.8 K/UL (ref 4–11)

## 2024-01-12 PROCEDURE — 3700000001 HC ADD 15 MINUTES (ANESTHESIA)

## 2024-01-12 PROCEDURE — 85027 COMPLETE CBC AUTOMATED: CPT

## 2024-01-12 PROCEDURE — 93325 DOPPLER ECHO COLOR FLOW MAPG: CPT

## 2024-01-12 PROCEDURE — 93315 ECHO TRANSESOPHAGEAL: CPT

## 2024-01-12 PROCEDURE — 2580000003 HC RX 258: Performed by: STUDENT IN AN ORGANIZED HEALTH CARE EDUCATION/TRAINING PROGRAM

## 2024-01-12 PROCEDURE — 3700000000 HC ANESTHESIA ATTENDED CARE

## 2024-01-12 PROCEDURE — 93005 ELECTROCARDIOGRAM TRACING: CPT | Performed by: INTERNAL MEDICINE

## 2024-01-12 PROCEDURE — 93010 ELECTROCARDIOGRAM REPORT: CPT | Performed by: INTERNAL MEDICINE

## 2024-01-12 PROCEDURE — 93320 DOPPLER ECHO COMPLETE: CPT

## 2024-01-12 PROCEDURE — 6360000002 HC RX W HCPCS: Performed by: NURSE ANESTHETIST, CERTIFIED REGISTERED

## 2024-01-12 PROCEDURE — 80048 BASIC METABOLIC PNL TOTAL CA: CPT

## 2024-01-12 RX ORDER — MEPERIDINE HYDROCHLORIDE 50 MG/ML
12.5 INJECTION INTRAMUSCULAR; INTRAVENOUS; SUBCUTANEOUS EVERY 5 MIN PRN
OUTPATIENT
Start: 2024-01-12

## 2024-01-12 RX ORDER — HYDROMORPHONE HYDROCHLORIDE 1 MG/ML
0.25 INJECTION, SOLUTION INTRAMUSCULAR; INTRAVENOUS; SUBCUTANEOUS EVERY 5 MIN PRN
OUTPATIENT
Start: 2024-01-12

## 2024-01-12 RX ORDER — ONDANSETRON 2 MG/ML
4 INJECTION INTRAMUSCULAR; INTRAVENOUS
OUTPATIENT
Start: 2024-01-12 | End: 2024-01-13

## 2024-01-12 RX ORDER — SODIUM CHLORIDE 9 MG/ML
INJECTION, SOLUTION INTRAVENOUS PRN
OUTPATIENT
Start: 2024-01-12

## 2024-01-12 RX ORDER — OXYCODONE HYDROCHLORIDE 5 MG/1
5 TABLET ORAL PRN
OUTPATIENT
Start: 2024-01-12 | End: 2024-01-12

## 2024-01-12 RX ORDER — LABETALOL HYDROCHLORIDE 5 MG/ML
10 INJECTION, SOLUTION INTRAVENOUS
OUTPATIENT
Start: 2024-01-12

## 2024-01-12 RX ORDER — SODIUM CHLORIDE 0.9 % (FLUSH) 0.9 %
5-40 SYRINGE (ML) INJECTION PRN
Status: DISCONTINUED | OUTPATIENT
Start: 2024-01-12 | End: 2024-01-12 | Stop reason: HOSPADM

## 2024-01-12 RX ORDER — SODIUM CHLORIDE 9 MG/ML
INJECTION, SOLUTION INTRAVENOUS PRN
Status: DISCONTINUED | OUTPATIENT
Start: 2024-01-12 | End: 2024-01-12 | Stop reason: HOSPADM

## 2024-01-12 RX ORDER — SODIUM CHLORIDE 0.9 % (FLUSH) 0.9 %
5-40 SYRINGE (ML) INJECTION EVERY 12 HOURS SCHEDULED
Status: DISCONTINUED | OUTPATIENT
Start: 2024-01-12 | End: 2024-01-12 | Stop reason: HOSPADM

## 2024-01-12 RX ORDER — SODIUM CHLORIDE 0.9 % (FLUSH) 0.9 %
5-40 SYRINGE (ML) INJECTION EVERY 12 HOURS SCHEDULED
OUTPATIENT
Start: 2024-01-12

## 2024-01-12 RX ORDER — OXYCODONE HYDROCHLORIDE 5 MG/1
10 TABLET ORAL PRN
OUTPATIENT
Start: 2024-01-12 | End: 2024-01-12

## 2024-01-12 RX ORDER — PROPOFOL 10 MG/ML
INJECTION, EMULSION INTRAVENOUS PRN
Status: DISCONTINUED | OUTPATIENT
Start: 2024-01-12 | End: 2024-01-12 | Stop reason: SDUPTHER

## 2024-01-12 RX ORDER — DIPHENHYDRAMINE HYDROCHLORIDE 50 MG/ML
12.5 INJECTION INTRAMUSCULAR; INTRAVENOUS
OUTPATIENT
Start: 2024-01-12 | End: 2024-01-13

## 2024-01-12 RX ORDER — SODIUM CHLORIDE 0.9 % (FLUSH) 0.9 %
5-40 SYRINGE (ML) INJECTION PRN
OUTPATIENT
Start: 2024-01-12

## 2024-01-12 RX ORDER — HYDROMORPHONE HYDROCHLORIDE 1 MG/ML
0.5 INJECTION, SOLUTION INTRAMUSCULAR; INTRAVENOUS; SUBCUTANEOUS EVERY 5 MIN PRN
OUTPATIENT
Start: 2024-01-12

## 2024-01-12 RX ADMIN — Medication 10 ML: at 08:19

## 2024-01-12 RX ADMIN — PROPOFOL 600 MG: 10 INJECTION, EMULSION INTRAVENOUS at 08:22

## 2024-01-12 RX ADMIN — SODIUM CHLORIDE: 9 INJECTION, SOLUTION INTRAVENOUS at 08:09

## 2024-01-12 NOTE — DISCHARGE INSTRUCTIONS
Cath Labs at  [facility]                      1/12/2024  Tomer Amaya   Date of Birth 1975     TRANSESOPHAGEAL ECHOCARDIOGRAM DISCHARGE INSTRUCTIONS      You have been given sedation for your procedure so you may not drive, operate any machinery, or sign any legal documentation for 24 hours.    Do not drink or eat for at least 2 hours AND until your gag reflex returns.  Check by touching the back of the tongue to be sure you can gag.  Do not drink any alcohol today.  Eat soft foods at first then gradually return to your normal diet.      CALL YOUR DOCTOR If you should develop a fever of 101, cough up teaspoon amounts of blood, develop severe shortness of breath or chest pain.  If symptoms are severe, go to the emergency room.    Contact your doctor for a follow-up visit and/or results of your procedure.      SEDATION DISCHARGE INSTRUCTION:  For the next 24 hours do not drive a car, operate machinery, power tools or kitchen appliances.  Do not drink alcohol; including beer or wine.  Do not make any important decisions or sign any important papers.  For the next 24 hours you can expect drowsiness, light-headed or dizziness, nausea/ vomiting, inability to concentrate, fatigue and desire to sleep.  We strongly suggest that a responsible adult be with for the next 24 hours, for your protection and safety.  You are not allowed to drive yourself home, or take any type of public transportation.  If any questions, please call your doctor.  If you cannot reach your Doctor then call the Emergency Department at  [phone].  Explain to the Emergency Department the procedure you had performed and they will be able to assist you.  If you seek Emergency Care, bring this form with you.    If your condition worsens or if you have any concerns, call your doctor or seek emergency medical services as needed. If you have any of the following symptoms/conditions, call your doctor.

## 2024-01-12 NOTE — ANESTHESIA PRE PROCEDURE
Department of Anesthesiology  Preprocedure Note       Name:  Tomer Amaya   Age:  48 y.o.  :  1975                                          MRN:  [id number]         Date:  2024      Surgeon: * Surgery not found *    Procedure:     Medications prior to admission:   Prior to Admission medications    Medication Sig Start Date End Date Taking? Authorizing Provider   DULoxetine (CYMBALTA) 30 MG extended release capsule Take 1 capsule by mouth daily  Patient not taking: Reported on 2024   Cristina Marin DO   dilTIAZem (CARDIZEM CD) 120 MG extended release capsule Take 1 capsule by mouth daily 23   Antonio Jackson DO       Current medications:    Current Facility-Administered Medications   Medication Dose Route Frequency Provider Last Rate Last Admin   • sodium chloride flush 0.9 % injection 5-40 mL  5-40 mL IntraVENous 2 times per day Antonio Jackson DO       • sodium chloride flush 0.9 % injection 5-40 mL  5-40 mL IntraVENous PRN Antonio Jackson DO       • 0.9 % sodium chloride infusion   IntraVENous PRN Antonio Jackson DO           Allergies:  No Known Allergies    Problem List:    Patient Active Problem List   Diagnosis Code   • Fatty liver K76.0   • Tachycardia R00.0   • Hypertension I10   • Anxiety F41.9       Past Medical History:  No past medical history on file.    Past Surgical History:  No past surgical history on file.    Social History:    Social History     Tobacco Use   • Smoking status: Never     Passive exposure: Never   • Smokeless tobacco: Not on file   Substance Use Topics   • Alcohol use: Not Currently     Comment: socially                                Counseling given: Not Answered      Vital Signs (Current):   Vitals:    24 0639   Weight: 71.7 kg (158 lb)   Height: 1.727 m (5' 8\")                                              BP Readings from Last 3 Encounters:   23 (!) 185/84   23 110/60   23 (!) 166/97       NPO

## 2024-01-12 NOTE — PERIOP NOTE
CATH LAB PROCEDURE LOG - TRANSESOPHAGEAL ECHOCARDIOGRAM    PRE Procedure:    ARRIVAL TIME: 0650    Pt arrived to Cath Lab.   Plan of Care: Hemodynamics and cardiac rhythm will remain stable. Comfort level will be maintained. Respiratory function will remain adequate. Pt/family will verbalize understanding of the procedure. Procedure will be tolerated without complications. Patient will recover from procedure without complications.   ID armband on patient and identification verified.   Informed consent obtained.   Non invasive blood pressure cuff applied, monitoring initiated.   EKG pads and pulse oximeter applied, monitoring initiated.   Instructions given. Patient and / or family verbalize understanding.   H&P will be documented by physician in Epic.     Pt has been NPO since midnight.     Transesophageal Echocardiogram:    Timeout and Fire Safety completed at 8:21 AM.     Correct patient verified.   Members of the surgical team/visitors introduced.   Allergies announced.   Correct procedure verified.   Correct procedure site verified.   Consents verified.   Implant equipment, additional services, special requirements available.   Medications labeled and available.   Appropriate pre medications have been administered.     Alcohol prep solution had sufficient time to dissipate if used. Yes=1, No=0  Surgical site or incision above the xyphoid. Yes=1, No=0  Open oxygen source. Yes=1, No=0  Available ignition source. Yes=1, No=0  Score total - 1.     0822: Anesthesia sedates patient, refer to anesthesia encounter.    0824: patient sufficiently sedated    8:25 AM HENRIETTA probe passed and images obtained.     0830: patient remains sedated, vitals stable, tolerating procedure fine.     8:41 AM Bubble test performed.     0842: patient remains sedated and tolerating procedure well    8:45 AM HENRIETTA probe removed without incident.     0848: IR  completed    8:50 AM Pt waking up, respirations spontaneous, able to converse

## 2024-01-12 NOTE — ANESTHESIA POSTPROCEDURE EVALUATION
Department of Anesthesiology  Postprocedure Note    Patient: Tomer Amaya  MRN: [id number]  YOB: 1975  Date of evaluation: 1/12/2024    Procedure Summary       Date: 01/12/24 Room / Location: [facility] Cardiac Cath Lab    Anesthesia Start: 0811 Anesthesia Stop: 0848    Procedure: ECHOCARDIOGRAM TRANSESOPHAGEAL Diagnosis:       Mitral valve prolapse      Abnormal echocardiogram      (assess Mitral Valve)    Scheduled Providers:  Responsible Provider: Calos Amaya MD    Anesthesia Type: MAC ASA Status: 2            Anesthesia Type: No value filed.    Dacia Phase I:      Dacia Phase II:      Anesthesia Post Evaluation    Patient location during evaluation: PACU  Patient participation: complete - patient participated  Level of consciousness: awake and alert  Airway patency: patent  Nausea & Vomiting: no nausea and no vomiting  Cardiovascular status: blood pressure returned to baseline  Respiratory status: acceptable  Hydration status: euvolemic  Comments: VSS on transfer to phase 2 recovery.  No anesthetic complications.  Pain management: adequate    No notable events documented.

## 2024-01-30 ENCOUNTER — TELEPHONE (OUTPATIENT)
Dept: CARDIOLOGY CLINIC | Age: 49
End: 2024-01-30

## 2024-01-30 NOTE — TELEPHONE ENCOUNTER
----- Message from Antonio Jackson, DO sent at 1/30/2024 11:23 AM EST -----  Please call patient. He has moderate to severe mitral regurgitation even on HENRIETTA. Recommend cardiac MR to further help quantify the severity. Please call patient and if agreeable place order for cardiac MR. Thanks. AMP

## 2024-01-30 NOTE — TELEPHONE ENCOUNTER
Attempted to reach the patient to relay results message from AMP.  left with office number for patient to return office call.

## 2024-01-31 NOTE — TELEPHONE ENCOUNTER
Second attempt to reach the patient to relay results message from AMP. YVROSE left with office number for patient to return office call.

## 2024-02-02 NOTE — TELEPHONE ENCOUNTER
Third attempt to reach the patient to relay results message from AMP. YVROSE left with office number for patient to return office call.

## 2024-03-13 NOTE — PROGRESS NOTES
CARDIOLOGY CONSULTATION      Patient Name: Tomer Amaya  Primary Care physician: Cristina Interiano DO    Reason for Referral/Chief Complaint: Tomer Amaya is a 48 y.o. patient who was initially referred to cardiology clinic for evaluation and treatment of palpitations and presents today for a follow up.     No chief complaint on file.     History of Present Illness:   Tomer Amaya is a 48 y.o. patient with a past medical history significant for hypertension, mitral valve prolapse and mitral regurgitation. Patient presented to [facility] 11/4/23 and 11/7/23, for tachycardia and palpitations. Patient does have a known history of tachycardia as well as hypertension. Patient was started on Toprol XL 50 mg daily. He was originally seen in office 11/13/23 and reported chronic fatigue and anxiety. He did not notice associated symptoms when he had the heart palpitation and stated he has been more fatigued since his first ED visit 11/4/23 and since starting beta blocker.  Denied smoking, alcohol use or recreational drug use.     He completed an echocardiogram 12/11/23 that showed normal LVEF, MVP and moderate to severe mitral regurgitation. He underwent HENRIETTA 1/22/24 that showed there appeared to be prolapse of both mitral valve leaflets. There are multiple regurgitant jets present which makes it somewhat difficult to accurately quantify the severity of mitral regurgitation present. Overall findings are consistent with moderate-severe mitral regurgitation. He wore an event monitor that showed normal sinus rhythm with low PVC burden. No documented arrhythmias. A cardiac MRI was recommended to help further quantify the severity but unable to reach the patient to schedule.     Today,           Father had a CABG-50-60s?    The patient denies chest pain, shortness of breath at rest and dyspnea with exertion. Denies palpitations, dizziness, near-syncope or mary anne syncope. Denies paroxysmal nocturnal

## 2024-03-14 ENCOUNTER — OFFICE VISIT (OUTPATIENT)
Dept: CARDIOLOGY CLINIC | Age: 49
End: 2024-03-14
Payer: COMMERCIAL

## 2024-03-14 VITALS
OXYGEN SATURATION: 99 % | HEART RATE: 63 BPM | WEIGHT: 161.8 LBS | DIASTOLIC BLOOD PRESSURE: 72 MMHG | SYSTOLIC BLOOD PRESSURE: 140 MMHG | BODY MASS INDEX: 24.52 KG/M2 | HEIGHT: 68 IN

## 2024-03-14 DIAGNOSIS — Z79.899 MEDICATION MANAGEMENT: ICD-10-CM

## 2024-03-14 DIAGNOSIS — I10 HYPERTENSION, UNSPECIFIED TYPE: ICD-10-CM

## 2024-03-14 DIAGNOSIS — I05.2 MITRAL STENOSIS WITH INSUFFICIENCY, UNSPECIFIED ETIOLOGY: Primary | ICD-10-CM

## 2024-03-14 DIAGNOSIS — R00.2 PALPITATIONS: ICD-10-CM

## 2024-03-14 DIAGNOSIS — I49.8 BIGEMINY: ICD-10-CM

## 2024-03-14 PROCEDURE — 3078F DIAST BP <80 MM HG: CPT | Performed by: STUDENT IN AN ORGANIZED HEALTH CARE EDUCATION/TRAINING PROGRAM

## 2024-03-14 PROCEDURE — 3077F SYST BP >= 140 MM HG: CPT | Performed by: STUDENT IN AN ORGANIZED HEALTH CARE EDUCATION/TRAINING PROGRAM

## 2024-03-14 PROCEDURE — 99214 OFFICE O/P EST MOD 30 MIN: CPT | Performed by: STUDENT IN AN ORGANIZED HEALTH CARE EDUCATION/TRAINING PROGRAM

## 2024-03-14 RX ORDER — LISINOPRIL 5 MG/1
5 TABLET ORAL DAILY
Qty: 90 TABLET | Refills: 1 | Status: SHIPPED | OUTPATIENT
Start: 2024-03-14

## 2024-03-14 NOTE — PROGRESS NOTES
CARDIOLOGY FOLLOW UP      Patient Name: Tomer Amaya  Primary Care physician: Cristina Interiano DO    Reason for Referral/Chief Complaint: Tomer Amaya is a 48 y.o. patient who was initially referred to cardiology clinic for evaluation and treatment of palpitations and presents today for a follow up.     Chief Complaint   Patient presents with    Follow-up    Hypertension      History of Present Illness:   Tomer Amaya is a 48 y.o. male with a past medical history significant for hypertension, mitral valve prolapse and mitral regurgitation. Patient presented to [facility] 11/4/23 and 11/7/23, for tachycardia and palpitations. Patient does have a known history of tachycardia as well as hypertension. Patient was started on Toprol XL 50 mg daily. He was originally seen in office 11/13/23 and reported chronic fatigue and anxiety. He did not notice associated symptoms when he had the heart palpitation and stated he has been more fatigued since his first ED visit 11/4/23 and since starting beta blocker.  Denied smoking, alcohol use or recreational drug use.     He completed an echocardiogram 12/11/23 that showed normal LVEF, MVP and moderate to severe mitral regurgitation. He underwent HENRIETTA 1/22/24 that showed there appeared to be prolapse of both mitral valve leaflets. There are multiple regurgitant jets present which makes it somewhat difficult to accurately quantify the severity of mitral regurgitation present. Overall findings are consistent with moderate-severe mitral regurgitation. He wore an event monitor 11/2023 that showed normal sinus rhythm with low PVC burden (1.33%). No documented arrhythmias. Patient underwent HENRIETTA 1/12/2024 which demonstrated an LVEF of 55%, prolapse of both mitral leaflets, multiple regurgitant jets present which makes it somewhat difficult to accurately quantify the severity of mitral regurgitation present, There appears to be mild pulmonary vein systolic

## 2024-03-14 NOTE — PATIENT INSTRUCTIONS
PLAN  Recommend cardiac MRI to assess severity of mitral regurgitation.    -to be done at [facility] on a Thursday or Friday and read by Dr. Moran.   Start lisinopril 5 mg daily.   - Check BMP in week to check kidney function and electrolytes. Also have fasting lipids done at that time. Fast for 8 hours prior to test.   -Call us if you feel dizzy/low BP with taking this medication.     Follow up with me in 3 months.     Your provider has ordered testing for further evaluation.  An order/prescription has been included in your paper work.   To schedule outpatient testing, contact Central Scheduling by calling [address] [place] [address] ([phone]).

## 2024-03-21 ENCOUNTER — HOSPITAL ENCOUNTER (OUTPATIENT)
Age: 49
Discharge: HOME | End: 2024-03-21
Payer: COMMERCIAL

## 2024-03-21 DIAGNOSIS — Z79.899 MEDICATION MANAGEMENT: ICD-10-CM

## 2024-03-21 LAB
ANION GAP SERPL CALCULATED.3IONS-SCNC: 13 MMOL/L (ref 3–16)
BUN SERPL-MCNC: 9 MG/DL (ref 7–20)
CALCIUM SERPL-MCNC: 9.7 MG/DL (ref 8.3–10.6)
CHLORIDE SERPL-SCNC: 98 MMOL/L (ref 99–110)
CHOLEST SERPL-MCNC: 229 MG/DL (ref 0–199)
CO2 SERPL-SCNC: 26 MMOL/L (ref 21–32)
CREAT SERPL-MCNC: 0.7 MG/DL (ref 0.9–1.3)
GFR SERPLBLD CREATININE-BSD FMLA CKD-EPI: >60 ML/MIN/{1.73_M2}
GLUCOSE SERPL-MCNC: 69 MG/DL (ref 70–99)
HDLC SERPL-MCNC: 83 MG/DL (ref 40–60)
LDL CHOLESTEROL CALCULATED: 131 MG/DL
POTASSIUM SERPL-SCNC: 3.8 MMOL/L (ref 3.5–5.1)
SODIUM SERPL-SCNC: 137 MMOL/L (ref 136–145)
TRIGL SERPL-MCNC: 75 MG/DL (ref 0–150)
VLDLC SERPL CALC-MCNC: 15 MG/DL

## 2024-03-21 PROCEDURE — 80048 BASIC METABOLIC PNL TOTAL CA: CPT

## 2024-03-21 PROCEDURE — 36415 COLL VENOUS BLD VENIPUNCTURE: CPT

## 2024-03-21 PROCEDURE — 80061 LIPID PANEL: CPT

## 2024-03-27 ENCOUNTER — TELEPHONE (OUTPATIENT)
Dept: CARDIOLOGY CLINIC | Age: 49
End: 2024-03-27

## 2024-03-27 NOTE — TELEPHONE ENCOUNTER
----- Message from Antonio Jackson DO sent at 3/27/2024  4:37 PM EDT -----  Please let patient know that his cholesterol was mild to moderately elevated (total cholesterol and the bad cholesterol-LDL). His good cholesterol-HDL is fine. Recommend mediterranean diet/low fat diet at this time. If in a few months, cholesterol is still elevated we would need to start a medication ie statin. His electrolytes and renal function are normal as well. Thanks. AMP

## 2024-04-19 ENCOUNTER — OFFICE VISIT (OUTPATIENT)
Dept: FAMILY MEDICINE CLINIC | Age: 49
End: 2024-04-19

## 2024-04-19 VITALS
OXYGEN SATURATION: 99 % | DIASTOLIC BLOOD PRESSURE: 100 MMHG | WEIGHT: 159 LBS | HEIGHT: 67 IN | SYSTOLIC BLOOD PRESSURE: 173 MMHG | HEART RATE: 89 BPM | BODY MASS INDEX: 24.96 KG/M2

## 2024-04-19 DIAGNOSIS — R53.83 OTHER FATIGUE: ICD-10-CM

## 2024-04-19 DIAGNOSIS — F41.9 ANXIETY: Primary | ICD-10-CM

## 2024-04-19 RX ORDER — ALPRAZOLAM 0.5 MG/1
0.5 TABLET ORAL 2 TIMES DAILY PRN
Qty: 60 TABLET | Refills: 0 | Status: SHIPPED | OUTPATIENT
Start: 2024-04-19 | End: 2024-05-19

## 2024-04-19 ASSESSMENT — PATIENT HEALTH QUESTIONNAIRE - PHQ9
SUM OF ALL RESPONSES TO PHQ9 QUESTIONS 1 & 2: 0
1. LITTLE INTEREST OR PLEASURE IN DOING THINGS: NOT AT ALL
2. FEELING DOWN, DEPRESSED OR HOPELESS: NOT AT ALL
1. LITTLE INTEREST OR PLEASURE IN DOING THINGS: NOT AT ALL
SUM OF ALL RESPONSES TO PHQ QUESTIONS 1-9: 0
SUM OF ALL RESPONSES TO PHQ9 QUESTIONS 1 & 2: 0
2. FEELING DOWN, DEPRESSED OR HOPELESS: NOT AT ALL
SUM OF ALL RESPONSES TO PHQ QUESTIONS 1-9: 0

## 2024-04-19 NOTE — PROGRESS NOTES
4/19/2024    This is a 48 y.o. male   Chief Complaint   Patient presents with    Anxiety     Follow up, fatigue.     .    Has seen cardio. Has mod-sev mvp with some degree of shunting. Having cardiac mri upcoming. Has been feeling fatigued especially when anxious. Did not tolerate the duloxetine either so we will try some xanax for prn control, especially for his upcoming study.         Patient Active Problem List   Diagnosis    Fatty liver    Tachycardia    Hypertension    Anxiety       Current Outpatient Medications   Medication Sig Dispense Refill    ALPRAZolam (XANAX) 0.5 MG tablet Take 1 tablet by mouth 2 times daily as needed for Anxiety for up to 30 days. Max Daily Amount: 1 mg 60 tablet 0    lisinopril (PRINIVIL;ZESTRIL) 5 MG tablet Take 1 tablet by mouth daily Hold if BP <100 mmHg systolic. 90 tablet 1    dilTIAZem (CARDIZEM CD) 120 MG extended release capsule Take 1 capsule by mouth daily 90 capsule 3     No current facility-administered medications for this visit.       No Known Allergies    Social History     Tobacco Use    Smoking status: Never     Passive exposure: Never    Smokeless tobacco: Not on file   Substance Use Topics    Alcohol use: Not Currently     Comment: socially         BP (!) 173/100 (Site: Left Upper Arm, Position: Sitting, Cuff Size: Medium Adult)   Pulse 89   Ht 1.702 m (5' 7\")   Wt 72.1 kg (159 lb)   SpO2 99%   BMI 24.90 kg/m²     Physical Exam  Vitals reviewed.   Constitutional:       Appearance: Normal appearance.   Cardiovascular:      Rate and Rhythm: Normal rate.      Pulses: Normal pulses.   Pulmonary:      Effort: Pulmonary effort is normal.   Neurological:      General: No focal deficit present.      Mental Status: He is alert and oriented to person, place, and time.         Assessment and Plan  1. Anxiety  - failed escitalopram and duloxetine  - ALPRAZolam (XANAX) 0.5 MG tablet; Take 1 tablet by mouth 2 times daily as needed for Anxiety for up to 30 days. Max Daily

## 2024-04-20 LAB
25(OH)D3 SERPL-MCNC: 8.4 NG/ML
ALBUMIN SERPL-MCNC: 5.1 G/DL (ref 3.4–5)
ALBUMIN/GLOB SERPL: 1.7 {RATIO} (ref 1.1–2.2)
ALP SERPL-CCNC: 60 U/L (ref 40–129)
ALT SERPL-CCNC: 13 U/L (ref 10–40)
ANION GAP SERPL CALCULATED.3IONS-SCNC: 12 MMOL/L (ref 3–16)
AST SERPL-CCNC: 17 U/L (ref 15–37)
BILIRUB SERPL-MCNC: 0.7 MG/DL (ref 0–1)
BUN SERPL-MCNC: 9 MG/DL (ref 7–20)
CALCIUM SERPL-MCNC: 10.1 MG/DL (ref 8.3–10.6)
CHLORIDE SERPL-SCNC: 100 MMOL/L (ref 99–110)
CO2 SERPL-SCNC: 26 MMOL/L (ref 21–32)
CREAT SERPL-MCNC: 0.8 MG/DL (ref 0.9–1.3)
GFR SERPLBLD CREATININE-BSD FMLA CKD-EPI: >90 ML/MIN/{1.73_M2}
GLUCOSE SERPL-MCNC: 88 MG/DL (ref 70–99)
POTASSIUM SERPL-SCNC: 4.8 MMOL/L (ref 3.5–5.1)
PROT SERPL-MCNC: 8.1 G/DL (ref 6.4–8.2)
SODIUM SERPL-SCNC: 138 MMOL/L (ref 136–145)
VIT B12 SERPL-MCNC: 638 PG/ML (ref 211–911)

## 2024-04-21 ENCOUNTER — HOSPITAL ENCOUNTER (OUTPATIENT)
Dept: MRI IMAGING | Age: 49
Discharge: HOME | End: 2024-04-21
Attending: STUDENT IN AN ORGANIZED HEALTH CARE EDUCATION/TRAINING PROGRAM
Payer: COMMERCIAL

## 2024-04-21 DIAGNOSIS — I49.8 BIGEMINY: ICD-10-CM

## 2024-04-21 DIAGNOSIS — I05.2 MITRAL STENOSIS WITH INSUFFICIENCY, UNSPECIFIED ETIOLOGY: ICD-10-CM

## 2024-04-21 PROCEDURE — 75565 CARD MRI VELOC FLOW MAPPING: CPT

## 2024-04-21 PROCEDURE — 75561 CARDIAC MRI FOR MORPH W/DYE: CPT | Performed by: INTERNAL MEDICINE

## 2024-04-21 PROCEDURE — 75565 CARD MRI VELOC FLOW MAPPING: CPT | Performed by: INTERNAL MEDICINE

## 2024-04-21 PROCEDURE — A9585 GADOBUTROL INJECTION: HCPCS | Performed by: STUDENT IN AN ORGANIZED HEALTH CARE EDUCATION/TRAINING PROGRAM

## 2024-04-21 PROCEDURE — 6360000004 HC RX CONTRAST MEDICATION: Performed by: STUDENT IN AN ORGANIZED HEALTH CARE EDUCATION/TRAINING PROGRAM

## 2024-04-21 RX ORDER — GADOBUTROL 604.72 MG/ML
12 INJECTION INTRAVENOUS
Status: COMPLETED | OUTPATIENT
Start: 2024-04-21 | End: 2024-04-21

## 2024-04-21 RX ADMIN — GADOBUTROL 12 ML: 604.72 INJECTION INTRAVENOUS at 09:54

## 2024-04-22 PROBLEM — I49.8 BIGEMINY: Status: ACTIVE | Noted: 2024-04-22

## 2024-04-25 ENCOUNTER — TELEPHONE (OUTPATIENT)
Dept: CARDIOLOGY CLINIC | Age: 49
End: 2024-04-25

## 2024-04-25 NOTE — TELEPHONE ENCOUNTER
Left message for patient to return call, if patient returns call can you please let him speak to me or AMP.     Thank you!

## 2024-04-25 NOTE — TELEPHONE ENCOUNTER
----- Message from Antonio Jackson DO sent at 4/23/2024  9:16 PM EDT -----  Please call patient and see what is the best time to arrange to discuss with him the results of the CMR. 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     Antonio Jackson DO  P [facility] Staff  Cc: Christi Pedro, RN  I attempted to reach patient (works third shift) this morning but it went to voicemail. I need to speak with him regarding his Cardiac MRI which shows severe mitral regurgitation. I am going to have him see CT surgery-Dr Landon with tentative plan for office visit on 5/2/24 (waiting on his nurse to confirm).

## 2024-05-03 ENCOUNTER — TELEPHONE (OUTPATIENT)
Dept: CARDIOLOGY CLINIC | Age: 49
End: 2024-05-03

## 2024-05-03 NOTE — TELEPHONE ENCOUNTER
----- Message from Nataliia Slaughter RN sent at 5/2/2024  4:18 PM EDT -----  Dr. Barbi Conklin would like this patient to have a left and right heart cath as part his pre op work up for Mitral valve repair vs. Replacement. Would you be able to assist with this?   Thank you so much,   Nataliia SALTER

## 2024-05-03 NOTE — TELEPHONE ENCOUNTER
Procedure:  [facility]/C  Doctor:  Dr. Chappell (Manuel/Barbi)  Date:  5/15/24  Time:  10am  Arrival:  8:30am  Reps:  n/a  Anesthesia:  n/a      Spoke with patient. Please have patient arrive to the main entrance of [facility] ([address] [place] [id number]) and check in with the registration desk.  They will be directed to the Cath Lab.  RN's - Please call patient regarding medication instructions. Remind patient to be NPO after midnight (8 hours prior). Do not apply lotions/creams on skin the day of procedure.      AMP, FXW - fyi only.

## 2024-05-06 NOTE — TELEPHONE ENCOUNTER
Please be NPO after midnight (8 hours).     Take all other medications including HTN meds      Do no take over the counter medications morning of.     Will attempt to call patient as he works third shift. I did reply to him on his Bearch message as well.

## 2024-05-15 ENCOUNTER — HOSPITAL ENCOUNTER (OUTPATIENT)
Age: 49
Discharge: HOME | End: 2024-05-15
Attending: INTERNAL MEDICINE | Admitting: INTERNAL MEDICINE
Payer: COMMERCIAL

## 2024-05-15 VITALS
HEART RATE: 81 BPM | SYSTOLIC BLOOD PRESSURE: 146 MMHG | RESPIRATION RATE: 12 BRPM | DIASTOLIC BLOOD PRESSURE: 86 MMHG | OXYGEN SATURATION: 99 %

## 2024-05-15 DIAGNOSIS — I34.0 NONRHEUMATIC MITRAL VALVE REGURGITATION: ICD-10-CM

## 2024-05-15 DIAGNOSIS — Z01.818 PRE-OP EXAMINATION: ICD-10-CM

## 2024-05-15 LAB
ANION GAP SERPL CALCULATED.3IONS-SCNC: 11 MMOL/L (ref 3–16)
BUN SERPL-MCNC: 13 MG/DL (ref 7–20)
CALCIUM SERPL-MCNC: 9.2 MG/DL (ref 8.3–10.6)
CHLORIDE SERPL-SCNC: 99 MMOL/L (ref 99–110)
CO2 SERPL-SCNC: 26 MMOL/L (ref 21–32)
CREAT SERPL-MCNC: 0.7 MG/DL (ref 0.9–1.3)
DEPRECATED RDW RBC AUTO: 13.1 % (ref 12.4–15.4)
EKG ATRIAL RATE: 84 BPM
EKG DIAGNOSIS: NORMAL
EKG P AXIS: 55 DEGREES
EKG P-R INTERVAL: 134 MS
EKG Q-T INTERVAL: 386 MS
EKG QRS DURATION: 94 MS
EKG QTC CALCULATION (BAZETT): 456 MS
EKG R AXIS: 60 DEGREES
EKG T AXIS: 42 DEGREES
EKG VENTRICULAR RATE: 84 BPM
GFR SERPLBLD CREATININE-BSD FMLA CKD-EPI: >90 ML/MIN/{1.73_M2}
GLUCOSE SERPL-MCNC: 102 MG/DL (ref 70–99)
HCT VFR BLD AUTO: 45.7 % (ref 40.5–52.5)
HGB BLD-MCNC: 15.2 G/DL (ref 13.5–17.5)
MCH RBC QN AUTO: 29.3 PG (ref 26–34)
MCHC RBC AUTO-ENTMCNC: 33.2 G/DL (ref 31–36)
MCV RBC AUTO: 88.2 FL (ref 80–100)
PLATELET # BLD AUTO: 289 K/UL (ref 135–450)
PMV BLD AUTO: 7.3 FL (ref 5–10.5)
POC ACT LR: 183 SEC
POTASSIUM SERPL-SCNC: 4 MMOL/L (ref 3.5–5.1)
RBC # BLD AUTO: 5.19 M/UL (ref 4.2–5.9)
SODIUM SERPL-SCNC: 136 MMOL/L (ref 136–145)
WBC # BLD AUTO: 7.7 K/UL (ref 4–11)

## 2024-05-15 PROCEDURE — 6370000000 HC RX 637 (ALT 250 FOR IP): Performed by: INTERNAL MEDICINE

## 2024-05-15 PROCEDURE — 99153 MOD SED SAME PHYS/QHP EA: CPT | Performed by: INTERNAL MEDICINE

## 2024-05-15 PROCEDURE — 7100000010 HC PHASE II RECOVERY - FIRST 15 MIN: Performed by: INTERNAL MEDICINE

## 2024-05-15 PROCEDURE — 76937 US GUIDE VASCULAR ACCESS: CPT

## 2024-05-15 PROCEDURE — C1894 INTRO/SHEATH, NON-LASER: HCPCS | Performed by: INTERNAL MEDICINE

## 2024-05-15 PROCEDURE — 6360000002 HC RX W HCPCS

## 2024-05-15 PROCEDURE — 80048 BASIC METABOLIC PNL TOTAL CA: CPT

## 2024-05-15 PROCEDURE — 6360000002 HC RX W HCPCS: Performed by: INTERNAL MEDICINE

## 2024-05-15 PROCEDURE — 85027 COMPLETE CBC AUTOMATED: CPT

## 2024-05-15 PROCEDURE — C1887 CATHETER, GUIDING: HCPCS | Performed by: INTERNAL MEDICINE

## 2024-05-15 PROCEDURE — 36415 COLL VENOUS BLD VENIPUNCTURE: CPT

## 2024-05-15 PROCEDURE — 2500000003 HC RX 250 WO HCPCS

## 2024-05-15 PROCEDURE — 99152 MOD SED SAME PHYS/QHP 5/>YRS: CPT | Performed by: INTERNAL MEDICINE

## 2024-05-15 PROCEDURE — 2709999900 HC NON-CHARGEABLE SUPPLY: Performed by: INTERNAL MEDICINE

## 2024-05-15 PROCEDURE — 6360000004 HC RX CONTRAST MEDICATION: Performed by: INTERNAL MEDICINE

## 2024-05-15 PROCEDURE — 2580000003 HC RX 258: Performed by: INTERNAL MEDICINE

## 2024-05-15 PROCEDURE — 2500000003 HC RX 250 WO HCPCS: Performed by: INTERNAL MEDICINE

## 2024-05-15 PROCEDURE — 7100000011 HC PHASE II RECOVERY - ADDTL 15 MIN: Performed by: INTERNAL MEDICINE

## 2024-05-15 PROCEDURE — 93005 ELECTROCARDIOGRAM TRACING: CPT | Performed by: INTERNAL MEDICINE

## 2024-05-15 PROCEDURE — 85347 COAGULATION TIME ACTIVATED: CPT

## 2024-05-15 PROCEDURE — C1769 GUIDE WIRE: HCPCS | Performed by: INTERNAL MEDICINE

## 2024-05-15 RX ORDER — FENTANYL CITRATE 50 UG/ML
INJECTION, SOLUTION INTRAMUSCULAR; INTRAVENOUS PRN
Status: DISCONTINUED | OUTPATIENT
Start: 2024-05-15 | End: 2024-05-15 | Stop reason: HOSPADM

## 2024-05-15 RX ORDER — SODIUM CHLORIDE 9 MG/ML
INJECTION, SOLUTION INTRAVENOUS CONTINUOUS
Status: DISCONTINUED | OUTPATIENT
Start: 2024-05-15 | End: 2024-05-15 | Stop reason: HOSPADM

## 2024-05-15 RX ORDER — SODIUM CHLORIDE 0.9 % (FLUSH) 0.9 %
5-40 SYRINGE (ML) INJECTION PRN
Status: CANCELLED | OUTPATIENT
Start: 2024-05-15

## 2024-05-15 RX ORDER — HEPARIN SODIUM 1000 [USP'U]/ML
INJECTION, SOLUTION INTRAVENOUS; SUBCUTANEOUS PRN
Status: DISCONTINUED | OUTPATIENT
Start: 2024-05-15 | End: 2024-05-15 | Stop reason: HOSPADM

## 2024-05-15 RX ORDER — MIDAZOLAM HYDROCHLORIDE 1 MG/ML
INJECTION INTRAMUSCULAR; INTRAVENOUS PRN
Status: DISCONTINUED | OUTPATIENT
Start: 2024-05-15 | End: 2024-05-15 | Stop reason: HOSPADM

## 2024-05-15 RX ORDER — SODIUM CHLORIDE 0.9 % (FLUSH) 0.9 %
5-40 SYRINGE (ML) INJECTION EVERY 12 HOURS SCHEDULED
Status: CANCELLED | OUTPATIENT
Start: 2024-05-15

## 2024-05-15 RX ORDER — ACETAMINOPHEN 325 MG/1
650 TABLET ORAL EVERY 4 HOURS PRN
Status: CANCELLED | OUTPATIENT
Start: 2024-05-15

## 2024-05-15 RX ORDER — SODIUM CHLORIDE 9 MG/ML
INJECTION, SOLUTION INTRAVENOUS PRN
Status: CANCELLED | OUTPATIENT
Start: 2024-05-15

## 2024-05-15 RX ORDER — ASPIRIN 325 MG
325 TABLET ORAL ONCE
Status: COMPLETED | OUTPATIENT
Start: 2024-05-15 | End: 2024-05-15

## 2024-05-15 RX ORDER — SODIUM CHLORIDE 0.9 % (FLUSH) 0.9 %
5-40 SYRINGE (ML) INJECTION PRN
Status: DISCONTINUED | OUTPATIENT
Start: 2024-05-15 | End: 2024-05-15 | Stop reason: HOSPADM

## 2024-05-15 RX ADMIN — ASPIRIN 325 MG: 325 TABLET ORAL at 10:26

## 2024-05-15 RX ADMIN — SODIUM CHLORIDE: 9 INJECTION, SOLUTION INTRAVENOUS at 11:15

## 2024-05-15 ASSESSMENT — PAIN SCALES - GENERAL: PAINLEVEL_OUTOF10: 0

## 2024-05-15 NOTE — DISCHARGE INSTRUCTIONS
Cath Labs at  [facility]   Discharge Instructions        5/15/2024  Tomer Amaya   Date of Birth 1975       Activity:  No driving for 24 hours.  In 24 hours you may remove dressing and shower, wash site gently with soap and water and leave open to air  Avoid submerging your arm in sitting water for 5 days.  Do not use your right hand for 24 hours, then  No lifting more than 5 pounds for 5 days.   No lotions, powders, or ointments near site for 5 days.   No work/school for 5 days unless instructed otherwise by your cardiologist.    Diet:   Resume previous diet, if a cardiac diet is specified you will receive a handout with  general guidelines.   Drink extra non-alcoholic/decaffienated fluids for first 24 hours after your procedure.    Arm Management:  If bleeding occurs from the site or a hematoma (lump) begins to increase in size, apply pressure directly over the site, call 911 to return to the hospital.    Special Instructions:  Report any coolness or numbness in the arm  Report any chills, fever, itching, red bumps or rash   Report any of the following to the MD: drainage from the site, redness and/or swelling at the site, increased tenderness at the site   If you are currently taking Metformin or Metformin combination medications for Diabetes, hold your dose for 48 hours after your procedure.  Consult your Cardiologist before taking any NSAIDS, vitamin supplements, estrogen, or estrogen plus progestin.  Do not stop taking Plavix, Brilinta or Effient, without first consulting your cardiologist.    Sedation Discharge Instructions:  For the next 24 hours do not drive a car, operate machinery, power tools or kitchen appliances.    Do not drink alcohol; including beer or wine.    Do not make any important decisions or sign any important papers.  For the next 24 hours you can expect drowsiness, light-headed or dizziness, nausea/ vomiting, inability to concentrate, fatigue and desire to sleep.  We

## 2024-05-15 NOTE — H&P
or deformity  Heart - Regular rate and rhythm, S1, S2 normal, no murmur, no rub or gallop  Abdomen - Soft, non-tender, bowel sounds active all four quadrants,  no masses, no organomegaly  Extremities - Extremities normal, atraumatic, no cyanosis or edema  Pulses - 2+ and symmetric upper and lower extremities  Skin - Skin color, texture, turgor normal, no rashes or lesions  Psych - Normal mood and affect  Neurologic - Normal gross motor and sensory exam.     Coumadin Use Last 7 Days:  No  Antiplatelet drug therapy use last 7 days: Yes - Aspirin  Other anticoagulant use last 7 days: No  Additional Medication Information:  No      Pre-Sedation Documentation and Exam:   I have personally completed a history, physical exam & review of systems for this patient (see notes).  Vital signs have been reviewed (see flow sheet for vitals).  I have reviewed the patient's history and review of systems.    Mallampati Airway Assessment:  Mallampati Class II - (soft palate, fauces & uvula are visible)    Prior History of Anesthesia Complications:   none    ASA Classification:  Class 2    Sedation/ Anesthesia Plan:   intravenous sedation    Medications Planned:   midazolam (Versed) intravenously and fentanyl intravenously    Patient is an appropriate candidate for plan of sedation: yes    Electronically signed by DIANNA AVILA MD on 5/15/2024 at 10:40 AM

## 2024-05-29 ENCOUNTER — OFFICE VISIT (OUTPATIENT)
Dept: FAMILY MEDICINE CLINIC | Age: 49
End: 2024-05-29
Payer: COMMERCIAL

## 2024-05-29 VITALS
BODY MASS INDEX: 25.21 KG/M2 | SYSTOLIC BLOOD PRESSURE: 141 MMHG | DIASTOLIC BLOOD PRESSURE: 96 MMHG | HEIGHT: 67 IN | WEIGHT: 160.6 LBS | OXYGEN SATURATION: 100 % | HEART RATE: 96 BPM

## 2024-05-29 DIAGNOSIS — F41.9 ANXIETY: ICD-10-CM

## 2024-05-29 DIAGNOSIS — I10 HYPERTENSION, UNSPECIFIED TYPE: ICD-10-CM

## 2024-05-29 DIAGNOSIS — Z79.2 NEED FOR ANTIBIOTIC PROPHYLAXIS FOR DENTAL PROCEDURE: Primary | ICD-10-CM

## 2024-05-29 PROCEDURE — G2211 COMPLEX E/M VISIT ADD ON: HCPCS | Performed by: SURGERY

## 2024-05-29 PROCEDURE — 3077F SYST BP >= 140 MM HG: CPT | Performed by: SURGERY

## 2024-05-29 PROCEDURE — 3080F DIAST BP >= 90 MM HG: CPT | Performed by: SURGERY

## 2024-05-29 PROCEDURE — 99213 OFFICE O/P EST LOW 20 MIN: CPT | Performed by: SURGERY

## 2024-05-29 RX ORDER — AMOXICILLIN 500 MG/1
2000 TABLET, FILM COATED ORAL ONCE
Qty: 4 TABLET | Refills: 0 | Status: SHIPPED | OUTPATIENT
Start: 2024-05-29 | End: 2024-05-29

## 2024-05-29 NOTE — PROGRESS NOTES
5/29/2024    This is a 48 y.o. male   Chief Complaint   Patient presents with    Dental Pain     Has dental appt coming up and they want him on antibiotic's    .    Having issues with left back molar.  Has an appointment with the dentist 6/11.  This is a new dental provider and due to his history of murmur but the mitral valve issue they are requesting antibiotics prior to his appointment.  They have not seen him before they requested his PCP prescribe the dental prophylaxis.  Has some discomfort at the tooth but no signs or symptoms of active infection or purulence.     He did have his heart cath and everything was good no intervention was needed.  Cleared to move forward with his mitral valve replacement.     His anxiety has been controlled as he has not had much activity besides home and work.  He has no complaints or concerns at this time.         Patient Active Problem List   Diagnosis    Fatty liver    Tachycardia    Hypertension    Anxiety    Bigeminy       Current Outpatient Medications   Medication Sig Dispense Refill    amoxicillin (AMOXIL) 500 MG tablet Take 4 tablets by mouth once for 1 dose Prior to dental procedure 4 tablet 0    lisinopril (PRINIVIL;ZESTRIL) 5 MG tablet Take 1 tablet by mouth daily Hold if BP <100 mmHg systolic. 90 tablet 1    dilTIAZem (CARDIZEM CD) 120 MG extended release capsule Take 1 capsule by mouth daily 90 capsule 3     No current facility-administered medications for this visit.       No Known Allergies    Social History     Tobacco Use    Smoking status: Never     Passive exposure: Never    Smokeless tobacco: Never   Substance Use Topics    Alcohol use: Not Currently     Comment: socially       BP (!) 141/96 (Site: Left Upper Arm, Position: Sitting, Cuff Size: Medium Adult)   Pulse 96   Ht 1.702 m (5' 7\")   Wt 72.8 kg (160 lb 9.6 oz)   SpO2 100%   BMI 25.15 kg/m²     Physical Exam  Vitals reviewed.   Constitutional:       Appearance: Normal appearance.   HENT:

## 2024-06-11 ENCOUNTER — TELEPHONE (OUTPATIENT)
Dept: CARDIOLOGY CLINIC | Age: 49
End: 2024-06-11

## 2024-06-11 RX ORDER — AMOXICILLIN 500 MG/1
CAPSULE ORAL
Qty: 4 CAPSULE | Refills: 0 | Status: SHIPPED | OUTPATIENT
Start: 2024-06-11

## 2024-06-11 NOTE — TELEPHONE ENCOUNTER
PER AMP     Amoxicillin 2g 1 hour before dental procedure.     Ok to proceed.     The patient is low risk for major adverse cardiac events for noncardiac surgery.  May proceed to the operating room at the discretion of the attending surgeon.  Please call with any questions or concerns that may arise. Will need P.O. amoxicillin 2g 1 hour prior to dental procedure.

## 2024-06-11 NOTE — TELEPHONE ENCOUNTER
Dr. Kimbrough called requesting cardiac clearance for wisdom teeth removal.  Pt was being prepped to have teeth pulled today but pt advised that currently under dr care for Nonreheumatic mitral valve regurgitation.  Dr. Kimbrough would like confirmaiton that safe for pt to go forward and having teeth removed.    CARDIAC CLEARANCE REQUEST    What type of procedure are you having: Winston Teeth removal    Are you taking any blood thinners:    Type on anesthesia:NA    When is your procedure scheduled for:TBD    What physician is performing your procedure: Dr. Kimbrough    Phone Number: [phone]    Fax number to send the letter:  [phone]

## 2024-06-25 ENCOUNTER — OFFICE VISIT (OUTPATIENT)
Dept: FAMILY MEDICINE CLINIC | Age: 49
End: 2024-06-25
Payer: COMMERCIAL

## 2024-06-25 VITALS
WEIGHT: 154.6 LBS | TEMPERATURE: 98.6 F | OXYGEN SATURATION: 98 % | SYSTOLIC BLOOD PRESSURE: 166 MMHG | HEART RATE: 114 BPM | BODY MASS INDEX: 24.27 KG/M2 | DIASTOLIC BLOOD PRESSURE: 103 MMHG | HEIGHT: 67 IN

## 2024-06-25 DIAGNOSIS — F41.9 ANXIETY: Primary | ICD-10-CM

## 2024-06-25 DIAGNOSIS — Z79.2 NEED FOR ANTIBIOTIC PROPHYLAXIS FOR DENTAL PROCEDURE: ICD-10-CM

## 2024-06-25 PROCEDURE — 3077F SYST BP >= 140 MM HG: CPT | Performed by: SURGERY

## 2024-06-25 PROCEDURE — 99213 OFFICE O/P EST LOW 20 MIN: CPT | Performed by: SURGERY

## 2024-06-25 PROCEDURE — 3080F DIAST BP >= 90 MM HG: CPT | Performed by: SURGERY

## 2024-06-25 RX ORDER — AMOXICILLIN 500 MG/1
CAPSULE ORAL
Qty: 4 CAPSULE | Refills: 0 | Status: SHIPPED | OUTPATIENT
Start: 2024-06-25

## 2024-06-25 ASSESSMENT — ENCOUNTER SYMPTOMS
VOMITING: 0
CONSTIPATION: 0
DIARRHEA: 0
NAUSEA: 1
SORE THROAT: 0

## 2024-06-25 NOTE — PROGRESS NOTES
6/25/2024    This is a 49 y.o. male   Chief Complaint   Patient presents with    Nausea     Stomach was upset,  and the heart rate was high   .    Help with some tooth extracted on Wednesday woke up this morning feeling nauseated and checked his blood pressure and pulse and both were elevated, he was worried that maybe he had a complication from his tooth extraction so called for an appointment.     He has had no pain in the jaw or problems since his extraction, there is a little residual swelling but it is much improved from the initial there is no warmth or redness and the socket looks appropriate for 1 week postop.  No signs of infection reassurance provided.     He is scheduled to have a cavity fixed on the tooth next to the 1 that was removed this is scheduled for July 8, will refill his amoxicillin prescription (dental Ppx)         Patient Active Problem List   Diagnosis    Fatty liver    Tachycardia    Hypertension    Anxiety    Bigeminy       Current Outpatient Medications   Medication Sig Dispense Refill    amoxicillin (AMOXIL) 500 MG capsule Take 4 tablets by mouth 1 hour prior to dental procedure 4 capsule 0    lisinopril (PRINIVIL;ZESTRIL) 5 MG tablet Take 1 tablet by mouth daily Hold if BP <100 mmHg systolic. 90 tablet 1    dilTIAZem (CARDIZEM CD) 120 MG extended release capsule Take 1 capsule by mouth daily 90 capsule 3     No current facility-administered medications for this visit.       No Known Allergies    Social History     Tobacco Use    Smoking status: Never     Passive exposure: Never    Smokeless tobacco: Never   Substance Use Topics    Alcohol use: Not Currently     Comment: socially       Review of Systems   Constitutional:  Negative for chills and fever.   HENT:  Negative for ear pain, mouth sores and sore throat.    Cardiovascular:  Negative for palpitations.   Gastrointestinal:  Positive for nausea (resolved). Negative for constipation, diarrhea and vomiting.       BP (!) 166/103 (Site:

## 2024-07-26 PROBLEM — I34.0 MITRAL REGURGITATION: Status: ACTIVE | Noted: 2024-07-26

## 2024-08-08 ENCOUNTER — HOSPITAL ENCOUNTER (OUTPATIENT)
Dept: VASCULAR LAB | Age: 49
Discharge: HOME | End: 2024-08-10
Attending: STUDENT IN AN ORGANIZED HEALTH CARE EDUCATION/TRAINING PROGRAM
Payer: COMMERCIAL

## 2024-08-08 ENCOUNTER — HOSPITAL ENCOUNTER (OUTPATIENT)
Dept: CT IMAGING | Age: 49
Discharge: HOME | End: 2024-08-08
Attending: STUDENT IN AN ORGANIZED HEALTH CARE EDUCATION/TRAINING PROGRAM
Payer: COMMERCIAL

## 2024-08-08 ENCOUNTER — HOSPITAL ENCOUNTER (OUTPATIENT)
Dept: PREADMISSION TESTING | Age: 49
Discharge: HOME | End: 2024-08-08
Payer: COMMERCIAL

## 2024-08-08 DIAGNOSIS — I34.0 MITRAL VALVE REGURGITATION: ICD-10-CM

## 2024-08-08 DIAGNOSIS — Z01.818 PRE-OP TESTING: ICD-10-CM

## 2024-08-08 LAB
ABO + RH BLD: NORMAL
ALBUMIN SERPL-MCNC: 4.4 G/DL (ref 3.4–5)
ALBUMIN/GLOB SERPL: 1.2 {RATIO} (ref 1.1–2.2)
ALP SERPL-CCNC: 55 U/L (ref 40–129)
ALT SERPL-CCNC: 13 U/L (ref 10–40)
ANION GAP SERPL CALCULATED.3IONS-SCNC: 15 MMOL/L (ref 3–16)
APTT BLD: 30.2 SEC (ref 22.1–36.4)
AST SERPL-CCNC: 17 U/L (ref 15–37)
BASOPHILS # BLD: 0 K/UL (ref 0–0.2)
BASOPHILS NFR BLD: 0.5 %
BILIRUB SERPL-MCNC: 0.6 MG/DL (ref 0–1)
BILIRUB UR QL STRIP.AUTO: NEGATIVE
BLD GP AB SCN SERPL QL: NORMAL
BUN SERPL-MCNC: 9 MG/DL (ref 7–20)
CALCIUM SERPL-MCNC: 9.7 MG/DL (ref 8.3–10.6)
CHLORIDE SERPL-SCNC: 99 MMOL/L (ref 99–110)
CHOLEST SERPL-MCNC: 266 MG/DL (ref 0–199)
CLARITY UR: CLEAR
CO2 SERPL-SCNC: 23 MMOL/L (ref 21–32)
COLOR UR: YELLOW
CREAT SERPL-MCNC: 0.8 MG/DL (ref 0.9–1.3)
DEPRECATED RDW RBC AUTO: 13.1 % (ref 12.4–15.4)
EOSINOPHIL # BLD: 0.1 K/UL (ref 0–0.6)
EOSINOPHIL NFR BLD: 1.1 %
EPI CELLS #/AREA URNS HPF: NORMAL /HPF (ref 0–5)
EST. AVERAGE GLUCOSE BLD GHB EST-MCNC: 96.8 MG/DL
GFR SERPLBLD CREATININE-BSD FMLA CKD-EPI: >90 ML/MIN/{1.73_M2}
GLUCOSE SERPL-MCNC: 91 MG/DL (ref 70–99)
GLUCOSE UR STRIP.AUTO-MCNC: NEGATIVE MG/DL
HBA1C MFR BLD: 5 %
HCT VFR BLD AUTO: 44.6 % (ref 40.5–52.5)
HDLC SERPL-MCNC: 86 MG/DL (ref 40–60)
HGB BLD-MCNC: 15.2 G/DL (ref 13.5–17.5)
HGB UR QL STRIP.AUTO: ABNORMAL
INR PPP: 0.95 (ref 0.85–1.15)
KETONES UR STRIP.AUTO-MCNC: NEGATIVE MG/DL
LDLC SERPL CALC-MCNC: 167 MG/DL
LEUKOCYTE ESTERASE UR QL STRIP.AUTO: NEGATIVE
LYMPHOCYTES # BLD: 1.8 K/UL (ref 1–5.1)
LYMPHOCYTES NFR BLD: 19.5 %
MCH RBC QN AUTO: 29.9 PG (ref 26–34)
MCHC RBC AUTO-ENTMCNC: 34 G/DL (ref 31–36)
MCV RBC AUTO: 87.8 FL (ref 80–100)
MONOCYTES # BLD: 0.4 K/UL (ref 0–1.3)
MONOCYTES NFR BLD: 4.7 %
NEUTROPHILS # BLD: 6.9 K/UL (ref 1.7–7.7)
NEUTROPHILS NFR BLD: 74.2 %
NITRITE UR QL STRIP.AUTO: NEGATIVE
PH UR STRIP.AUTO: 6 [PH] (ref 5–8)
PLATELET # BLD AUTO: 293 K/UL (ref 135–450)
PMV BLD AUTO: 7.8 FL (ref 5–10.5)
POTASSIUM SERPL-SCNC: 4.5 MMOL/L (ref 3.5–5.1)
PROT SERPL-MCNC: 8.1 G/DL (ref 6.4–8.2)
PROT UR STRIP.AUTO-MCNC: NEGATIVE MG/DL
PROTHROMBIN TIME: 12.9 SEC (ref 11.9–14.9)
RBC # BLD AUTO: 5.08 M/UL (ref 4.2–5.9)
RBC #/AREA URNS HPF: NORMAL /HPF (ref 0–4)
SODIUM SERPL-SCNC: 137 MMOL/L (ref 136–145)
SP GR UR STRIP.AUTO: 1.02 (ref 1–1.03)
TRIGL SERPL-MCNC: 65 MG/DL (ref 0–150)
UA DIPSTICK W REFLEX MICRO PNL UR: YES
URN SPEC COLLECT METH UR: ABNORMAL
UROBILINOGEN UR STRIP-ACNC: 0.2 E.U./DL
VAS LEFT ARM BP: 166 MMHG
VAS LEFT CCA DIST EDV: 25.2 CM/S
VAS LEFT CCA DIST PSV: 90.4 CM/S
VAS LEFT CCA MID EDV: 22.4 CM/S
VAS LEFT CCA MID PSV: 120 CM/S
VAS LEFT CCA PROX EDV: 28.1 CM/S
VAS LEFT CCA PROX PSV: 161 CM/S
VAS LEFT ECA PSV: 59.9 CM/S
VAS LEFT ICA DIST EDV: 34 CM/S
VAS LEFT ICA DIST PSV: 82.2 CM/S
VAS LEFT ICA MID EDV: 25.6 CM/S
VAS LEFT ICA MID PSV: 60.2 CM/S
VAS LEFT ICA PROX EDV: 13.5 CM/S
VAS LEFT ICA PROX PSV: 37 CM/S
VAS LEFT ICA/CCA PSV: 0.31
VAS LEFT SUBCLAVIAN PROX EDV: 0 CM/S
VAS LEFT SUBCLAVIAN PROX PSV: 143 CM/S
VAS LEFT VERTEBRAL EDV: 18.9 CM/S
VAS LEFT VERTEBRAL PSV: 54.4 CM/S
VAS RIGHT ARM BP: 151 MMHG
VAS RIGHT CCA DIST EDV: 25.1 CM/S
VAS RIGHT CCA DIST PSV: 110 CM/S
VAS RIGHT CCA MID EDV: 26.8 CM/S
VAS RIGHT CCA MID PSV: 157 CM/S
VAS RIGHT CCA PROX EDV: 17.1 CM/S
VAS RIGHT CCA PROX PSV: 174 CM/S
VAS RIGHT ECA EDV: 18 CM/S
VAS RIGHT ECA PSV: 95.7 CM/S
VAS RIGHT ICA DIST EDV: 29.9 CM/S
VAS RIGHT ICA DIST PSV: 70.9 CM/S
VAS RIGHT ICA MID EDV: 29 CM/S
VAS RIGHT ICA MID PSV: 66.3 CM/S
VAS RIGHT ICA PROX EDV: 14 CM/S
VAS RIGHT ICA PROX PSV: 64.1 CM/S
VAS RIGHT ICA/CCA PSV: 0.41
VAS RIGHT SUBCLAVIAN PROX EDV: 0 CM/S
VAS RIGHT SUBCLAVIAN PROX PSV: 146 CM/S
VAS RIGHT VERTEBRAL EDV: 17.2 CM/S
VAS RIGHT VERTEBRAL PSV: 56.8 CM/S
VLDLC SERPL CALC-MCNC: 13 MG/DL
WBC # BLD AUTO: 9.3 K/UL (ref 4–11)
WBC #/AREA URNS HPF: NORMAL /HPF (ref 0–5)

## 2024-08-08 PROCEDURE — 85025 COMPLETE CBC W/AUTO DIFF WBC: CPT

## 2024-08-08 PROCEDURE — 85730 THROMBOPLASTIN TIME PARTIAL: CPT

## 2024-08-08 PROCEDURE — 80061 LIPID PANEL: CPT

## 2024-08-08 PROCEDURE — 86901 BLOOD TYPING SEROLOGIC RH(D): CPT

## 2024-08-08 PROCEDURE — 83036 HEMOGLOBIN GLYCOSYLATED A1C: CPT

## 2024-08-08 PROCEDURE — 71250 CT THORAX DX C-: CPT

## 2024-08-08 PROCEDURE — 87086 URINE CULTURE/COLONY COUNT: CPT

## 2024-08-08 PROCEDURE — 80053 COMPREHEN METABOLIC PANEL: CPT

## 2024-08-08 PROCEDURE — 85610 PROTHROMBIN TIME: CPT

## 2024-08-08 PROCEDURE — 93005 ELECTROCARDIOGRAM TRACING: CPT | Performed by: STUDENT IN AN ORGANIZED HEALTH CARE EDUCATION/TRAINING PROGRAM

## 2024-08-08 PROCEDURE — 81001 URINALYSIS AUTO W/SCOPE: CPT

## 2024-08-08 PROCEDURE — 36415 COLL VENOUS BLD VENIPUNCTURE: CPT

## 2024-08-08 PROCEDURE — 86900 BLOOD TYPING SEROLOGIC ABO: CPT

## 2024-08-08 PROCEDURE — 86850 RBC ANTIBODY SCREEN: CPT

## 2024-08-08 PROCEDURE — 93880 EXTRACRANIAL BILAT STUDY: CPT

## 2024-08-09 LAB
BACTERIA UR CULT: NORMAL
EKG ATRIAL RATE: 85 BPM
EKG DIAGNOSIS: NORMAL
EKG P AXIS: 64 DEGREES
EKG P-R INTERVAL: 150 MS
EKG Q-T INTERVAL: 366 MS
EKG QRS DURATION: 96 MS
EKG QTC CALCULATION (BAZETT): 435 MS
EKG R AXIS: 66 DEGREES
EKG T AXIS: 43 DEGREES
EKG VENTRICULAR RATE: 85 BPM

## 2024-08-09 PROCEDURE — 93010 ELECTROCARDIOGRAM REPORT: CPT | Performed by: INTERNAL MEDICINE

## 2024-08-13 ENCOUNTER — OFFICE VISIT (OUTPATIENT)
Dept: FAMILY MEDICINE CLINIC | Age: 49
End: 2024-08-13
Payer: COMMERCIAL

## 2024-08-13 VITALS
HEIGHT: 67 IN | DIASTOLIC BLOOD PRESSURE: 86 MMHG | SYSTOLIC BLOOD PRESSURE: 142 MMHG | BODY MASS INDEX: 24.96 KG/M2 | WEIGHT: 159 LBS | HEART RATE: 86 BPM | OXYGEN SATURATION: 98 %

## 2024-08-13 DIAGNOSIS — B96.89 BACTERIAL ORAL INFECTION: Primary | ICD-10-CM

## 2024-08-13 DIAGNOSIS — K12.1 BACTERIAL ORAL INFECTION: Primary | ICD-10-CM

## 2024-08-13 PROCEDURE — 99213 OFFICE O/P EST LOW 20 MIN: CPT | Performed by: SURGERY

## 2024-08-13 PROCEDURE — 3079F DIAST BP 80-89 MM HG: CPT | Performed by: SURGERY

## 2024-08-13 PROCEDURE — 3077F SYST BP >= 140 MM HG: CPT | Performed by: SURGERY

## 2024-08-13 RX ORDER — AMOXICILLIN 500 MG/1
500 CAPSULE ORAL 2 TIMES DAILY
Qty: 10 CAPSULE | Refills: 0 | Status: SHIPPED | OUTPATIENT
Start: 2024-08-13 | End: 2024-08-18

## 2024-08-13 NOTE — PROGRESS NOTES
8/13/2024    This is a 49 y.o. male   Chief Complaint   Patient presents with    Swelling     Jaw line on the Lt side some mild discomfort    .    Has MVR coming up next week, does not want to risk infection.         Patient Active Problem List   Diagnosis    Fatty liver    Tachycardia    Hypertension    Anxiety    Bigeminy    Mitral regurgitation       Current Outpatient Medications   Medication Sig Dispense Refill    amoxicillin (AMOXIL) 500 MG capsule Take 1 capsule by mouth 2 times daily for 5 days 10 capsule 0    mupirocin (BACTROBAN) 2 % ointment Apply to both nares the night before surgery and the morning of surgery. 1 each 0    chlorhexidine gluconate (HIBICLENS) 4 % SOLN external solution Please Lather entire body and rinse the night before surgery and the morning of surgery. 1 each 0    chlorhexidine (PERIDEX) 0.12 % solution Swish and spit 15 ml the night before surgery and the morning of surgery. 40 mL 0    lisinopril (PRINIVIL;ZESTRIL) 5 MG tablet Take 1 tablet by mouth daily Hold if BP <100 mmHg systolic. 90 tablet 1    dilTIAZem (CARDIZEM CD) 120 MG extended release capsule Take 1 capsule by mouth daily 90 capsule 3     No current facility-administered medications for this visit.       No Known Allergies    Social History     Tobacco Use    Smoking status: Never     Passive exposure: Never    Smokeless tobacco: Never   Substance Use Topics    Alcohol use: Not Currently       Review of Systems    BP (!) 142/86 (Site: Left Upper Arm, Position: Sitting, Cuff Size: Medium Adult)   Pulse 86   Ht 1.702 m (5' 7\")   Wt 72.1 kg (159 lb)   SpO2 98%   BMI 24.90 kg/m²     Physical Exam  HENT:      Head:      Jaw: Tenderness (mildly swollen) present.        Mouth/Throat:      Dentition: Gingival swelling (erythema and induration) present.           Assessment and Plan  1. Bacterial oral infection  - no abscess, but with upcoming mitral valve replacement will err on side of caution and treat  - amoxicillin

## 2024-08-15 ENCOUNTER — TELEPHONE (OUTPATIENT)
Dept: CARDIOLOGY CLINIC | Age: 49
End: 2024-08-15

## 2024-08-15 NOTE — TELEPHONE ENCOUNTER
Received disability paperwork regarding patients surgery scheduled 8/26/24 with Dr. Landon, are you willing to fill these out or should I forward on to Dr Landon's office?       PROCEDURE   OPEN MITRAL VALVE REPAIR, POSSIBLE MITRAL VALVE REPLACEMENT, LEFT ATRIAL APPENDAGE CLIP PLACEMENT, LONGITIDUNAL STERNAL STABILIZATION                                  GALEAS

## 2024-08-16 NOTE — TELEPHONE ENCOUNTER
Patient presented himself in office with additional paperwork. I will have AMP review them all and decide if he will fill out.

## 2024-08-16 NOTE — TELEPHONE ENCOUNTER
AMP are you wanting me to find out if patient is going to work up until his surgery? I dont see why in any documentation for him to be off starting now?     If so can we just give him a letter with taking him off?

## 2024-08-16 NOTE — TELEPHONE ENCOUNTER
Called patient to tell him that someone else would have to fill out forms.  He said that [facility] nurse said that they will not fill out forms.  His  said that AMP needs to fill out paperwork since he is the one that said that he needs the procedure.

## 2024-08-16 NOTE — TELEPHONE ENCOUNTER
Actually, the disability forms states     Please provide copies of the following medical information from August 26 2024        I will fax these to Dr Davis office as his surgery is 8/26  However patient is getting frustrated having to be told that no one will these forms out for him. He reports that his insurance said since AMP referred him that he needs to fill out the form he dropped off in office.

## 2024-08-16 NOTE — TELEPHONE ENCOUNTER
Antonio Jackson, Isabel Pires, RN  Caller: Unspecified (Yesterday,  9:57 AM)  Reviewed the forms.  This included physical assessment with range of motion, how much patient can lift, etc. This would be best filled out by his PCP or even occupational health.

## 2024-08-20 NOTE — TELEPHONE ENCOUNTER
He is working til Thursday. And surgery is on Monday.  Told him that Dr. Davis would likely fill out his paperwork.

## 2024-08-20 NOTE — TELEPHONE ENCOUNTER
Antonio Jackson, Isabel Pires, RN  Caller: Unspecified (5 days ago,  9:57 AM)  There is no reason that he would not be able to work up until surgery.  So as of right now he has not had any decrease in his functional status to my knowledge so filling out these forms would not be of benefit to get him out of work prior to surgery.

## 2024-08-21 NOTE — TELEPHONE ENCOUNTER
Dr. Landon office faxed back the paperwork by mistake. Nataliia his nurse says they will fill paperwork out for him on day of surgery.

## 2024-09-03 DIAGNOSIS — I10 HYPERTENSION, UNSPECIFIED TYPE: ICD-10-CM

## 2024-09-03 RX ORDER — LISINOPRIL 5 MG/1
TABLET ORAL
Qty: 90 TABLET | Refills: 1 | Status: SHIPPED | OUTPATIENT
Start: 2024-09-03

## 2024-09-24 ENCOUNTER — OFFICE VISIT (OUTPATIENT)
Dept: CARDIOLOGY CLINIC | Age: 49
End: 2024-09-24
Payer: COMMERCIAL

## 2024-09-24 VITALS
HEART RATE: 68 BPM | DIASTOLIC BLOOD PRESSURE: 86 MMHG | OXYGEN SATURATION: 99 % | WEIGHT: 165 LBS | SYSTOLIC BLOOD PRESSURE: 158 MMHG | BODY MASS INDEX: 25.9 KG/M2 | HEIGHT: 67 IN

## 2024-09-24 DIAGNOSIS — I15.9 SECONDARY HYPERTENSION: ICD-10-CM

## 2024-09-24 DIAGNOSIS — I34.0 NONRHEUMATIC MITRAL VALVE REGURGITATION: ICD-10-CM

## 2024-09-24 DIAGNOSIS — I49.8 BIGEMINY: ICD-10-CM

## 2024-09-24 DIAGNOSIS — I10 HYPERTENSION, UNSPECIFIED TYPE: ICD-10-CM

## 2024-09-24 DIAGNOSIS — I10 PRIMARY HYPERTENSION: Primary | ICD-10-CM

## 2024-09-24 DIAGNOSIS — F41.9 ANXIETY: ICD-10-CM

## 2024-09-24 PROCEDURE — 3077F SYST BP >= 140 MM HG: CPT | Performed by: STUDENT IN AN ORGANIZED HEALTH CARE EDUCATION/TRAINING PROGRAM

## 2024-09-24 PROCEDURE — 99213 OFFICE O/P EST LOW 20 MIN: CPT | Performed by: STUDENT IN AN ORGANIZED HEALTH CARE EDUCATION/TRAINING PROGRAM

## 2024-09-24 PROCEDURE — 3079F DIAST BP 80-89 MM HG: CPT | Performed by: STUDENT IN AN ORGANIZED HEALTH CARE EDUCATION/TRAINING PROGRAM

## 2024-09-24 RX ORDER — LISINOPRIL 5 MG/1
5 TABLET ORAL DAILY
Qty: 90 TABLET | Refills: 3 | Status: SHIPPED | OUTPATIENT
Start: 2024-09-24

## 2024-09-24 RX ORDER — DILTIAZEM HYDROCHLORIDE 120 MG/1
120 CAPSULE, COATED, EXTENDED RELEASE ORAL DAILY
Qty: 90 CAPSULE | Refills: 3 | Status: SHIPPED | OUTPATIENT
Start: 2024-09-24

## 2024-10-02 NOTE — PROGRESS NOTES
Hanylorena MATTHEW Jose Luis    Age 49 y.o.    male    1975    MRN [id number]    10/18/2024  Arrival Time_____________  OR Time____________434 Min     Procedure(s):  OPEN MITRAL VALVE REPAIR, POSSIBLE MITRAL VALVE REPLACEMENT, LEFT ATRIAL APPENDAGE CLIP PLACEMENT, LONGITIDUNAL STERNAL STABILIZATION                      General   Surgeon(s):  Igor Landon, MD Salcedo, Lara S, MD      DAY ADMIT ___  SDS/OP ___  OUTPT IN BED ___        Phone [phone] (home) [phone] (work)                 PCP _____________________ Phone_________________ Epic ( ) Epic CE ( ) Appt ________    NOTES: _________________________________________ Consult/Cardio _______________    ____________________________________________________________________________    ____________________________________________________________________________  PAT APPT DATE:________ TIME: ________  FAXED QAD: _______  (__) H&P w/ Hospitalist    (__) PAT orders in EPIC    (__) Meet with PAT nurse  __________________________________________________________________________  Preop Nurse phone screen complete: _____________  (__) CBC     (__) W/ DIFF ___________  (__) CT CHEST  __________   (__) Hgb A1C    ___________  (__) CHEST X RAY   __________  (__) LIPID PROFILE  ___________  (__) EKG   __________  (__) PT-INR / APTT  ___________  (__) PFT's   __________  (__) BMP   ___________  (__) CAROTIDS  __________  (__) CMP   ___________  (__) VEIN MAPPING  __________  (__) U/A   ___________  ( X ) HISTORY & PHYSICAL __________  (__) URINE C & S  ___________  (__) CARDIAC CLEARANCE __________  (__) U/A W/ FLEX  ___________  (__) PULM. CLEARANCE __________  (__) SERUM PREGNANCY ___________  (__) Preop Orders in EPIC __________  (__) TYPE & SCREEN __________repeat ( ) (__)  __________________ __________  (__) Albumin   ___________  (__)  __________________ __________  (__) TRANSFERRIN  ___________  (__)  __________________ __________  (__) LIVER  PROFILE  ___________  (__) URINE PREG DOS __________  (__) MRSA NASAL SWAB ___________  (__) BLOOD SUGAR DOS __________  (__) SED RATE  ___________  (__) OAC  _________________________  (__) C-REACTIVE PROTEIN ___________    (__) VITAMIN D HYDROXY ___________  (__) BETABLOCKER  _________________                                                                                       (__) ACE/ARBS:__________________________    (__) GLP-1 Agonist ___________________                Ride home/Contact #_______________________   (__) SCLT2 inhibitor ___________________

## 2024-10-09 ENCOUNTER — HOSPITAL ENCOUNTER (OUTPATIENT)
Dept: GENERAL RADIOLOGY | Age: 49
Discharge: HOME | End: 2024-10-09
Payer: COMMERCIAL

## 2024-10-09 ENCOUNTER — HOSPITAL ENCOUNTER (OUTPATIENT)
Dept: PREADMISSION TESTING | Age: 49
Discharge: HOME | End: 2024-10-13
Payer: COMMERCIAL

## 2024-10-09 DIAGNOSIS — I34.0 MITRAL VALVE INSUFFICIENCY, UNSPECIFIED ETIOLOGY: ICD-10-CM

## 2024-10-09 LAB
ALBUMIN SERPL-MCNC: 4.4 G/DL (ref 3.4–5)
ALBUMIN/GLOB SERPL: 1.6 {RATIO} (ref 1.1–2.2)
ALP SERPL-CCNC: 49 U/L (ref 40–129)
ALT SERPL-CCNC: 14 U/L (ref 10–40)
ANION GAP SERPL CALCULATED.3IONS-SCNC: 10 MMOL/L (ref 3–16)
APTT BLD: 32.3 SEC (ref 22.1–36.4)
AST SERPL-CCNC: 22 U/L (ref 15–37)
BASOPHILS # BLD: 0.1 K/UL (ref 0–0.2)
BASOPHILS NFR BLD: 1 %
BILIRUB SERPL-MCNC: 0.7 MG/DL (ref 0–1)
BILIRUB UR QL STRIP.AUTO: NEGATIVE
BUN SERPL-MCNC: 9 MG/DL (ref 7–20)
CALCIUM SERPL-MCNC: 9.7 MG/DL (ref 8.3–10.6)
CHLORIDE SERPL-SCNC: 99 MMOL/L (ref 99–110)
CHOLEST SERPL-MCNC: 235 MG/DL (ref 0–199)
CLARITY UR: CLEAR
CO2 SERPL-SCNC: 27 MMOL/L (ref 21–32)
COLOR UR: ABNORMAL
CREAT SERPL-MCNC: 0.9 MG/DL (ref 0.9–1.3)
DEPRECATED RDW RBC AUTO: 13 % (ref 12.4–15.4)
EKG ATRIAL RATE: 77 BPM
EKG DIAGNOSIS: NORMAL
EKG P AXIS: 59 DEGREES
EKG P-R INTERVAL: 148 MS
EKG Q-T INTERVAL: 396 MS
EKG QRS DURATION: 96 MS
EKG QTC CALCULATION (BAZETT): 448 MS
EKG R AXIS: 53 DEGREES
EKG T AXIS: 28 DEGREES
EKG VENTRICULAR RATE: 77 BPM
EOSINOPHIL # BLD: 0.2 K/UL (ref 0–0.6)
EOSINOPHIL NFR BLD: 3.1 %
EPI CELLS #/AREA URNS HPF: NORMAL /HPF (ref 0–5)
GFR SERPLBLD CREATININE-BSD FMLA CKD-EPI: >90 ML/MIN/{1.73_M2}
GLUCOSE SERPL-MCNC: 75 MG/DL (ref 70–99)
GLUCOSE UR STRIP.AUTO-MCNC: NEGATIVE MG/DL
HCT VFR BLD AUTO: 42.1 % (ref 40.5–52.5)
HDLC SERPL-MCNC: 79 MG/DL (ref 40–60)
HGB BLD-MCNC: 14.5 G/DL (ref 13.5–17.5)
HGB UR QL STRIP.AUTO: NEGATIVE
INR PPP: 0.97 (ref 0.85–1.15)
KETONES UR STRIP.AUTO-MCNC: NEGATIVE MG/DL
LDLC SERPL CALC-MCNC: 146 MG/DL
LEUKOCYTE ESTERASE UR QL STRIP.AUTO: ABNORMAL
LYMPHOCYTES # BLD: 2.2 K/UL (ref 1–5.1)
LYMPHOCYTES NFR BLD: 33.8 %
MCH RBC QN AUTO: 30.1 PG (ref 26–34)
MCHC RBC AUTO-ENTMCNC: 34.4 G/DL (ref 31–36)
MCV RBC AUTO: 87.4 FL (ref 80–100)
MONOCYTES # BLD: 0.4 K/UL (ref 0–1.3)
MONOCYTES NFR BLD: 6 %
NEUTROPHILS # BLD: 3.7 K/UL (ref 1.7–7.7)
NEUTROPHILS NFR BLD: 56.1 %
NITRITE UR QL STRIP.AUTO: NEGATIVE
PH UR STRIP.AUTO: 6.5 [PH] (ref 5–8)
PLATELET # BLD AUTO: 273 K/UL (ref 135–450)
PMV BLD AUTO: 8 FL (ref 5–10.5)
POTASSIUM SERPL-SCNC: 3.9 MMOL/L (ref 3.5–5.1)
PROT SERPL-MCNC: 7.2 G/DL (ref 6.4–8.2)
PROT UR STRIP.AUTO-MCNC: NEGATIVE MG/DL
PROTHROMBIN TIME: 13.1 SEC (ref 11.9–14.9)
RBC # BLD AUTO: 4.82 M/UL (ref 4.2–5.9)
RBC #/AREA URNS HPF: NORMAL /HPF (ref 0–4)
SODIUM SERPL-SCNC: 136 MMOL/L (ref 136–145)
SP GR UR STRIP.AUTO: <=1.005 (ref 1–1.03)
TRIGL SERPL-MCNC: 51 MG/DL (ref 0–150)
UA DIPSTICK W REFLEX MICRO PNL UR: YES
URN SPEC COLLECT METH UR: ABNORMAL
UROBILINOGEN UR STRIP-ACNC: 0.2 E.U./DL
VLDLC SERPL CALC-MCNC: 10 MG/DL
WBC # BLD AUTO: 6.6 K/UL (ref 4–11)
WBC #/AREA URNS HPF: NORMAL /HPF (ref 0–5)

## 2024-10-09 PROCEDURE — 80053 COMPREHEN METABOLIC PANEL: CPT

## 2024-10-09 PROCEDURE — 85730 THROMBOPLASTIN TIME PARTIAL: CPT

## 2024-10-09 PROCEDURE — 71046 X-RAY EXAM CHEST 2 VIEWS: CPT

## 2024-10-09 PROCEDURE — 81001 URINALYSIS AUTO W/SCOPE: CPT

## 2024-10-09 PROCEDURE — 80061 LIPID PANEL: CPT

## 2024-10-09 PROCEDURE — 36415 COLL VENOUS BLD VENIPUNCTURE: CPT

## 2024-10-09 PROCEDURE — 87086 URINE CULTURE/COLONY COUNT: CPT

## 2024-10-09 PROCEDURE — 93010 ELECTROCARDIOGRAM REPORT: CPT | Performed by: INTERNAL MEDICINE

## 2024-10-09 PROCEDURE — 85610 PROTHROMBIN TIME: CPT

## 2024-10-09 PROCEDURE — 93005 ELECTROCARDIOGRAM TRACING: CPT

## 2024-10-09 PROCEDURE — 85025 COMPLETE CBC W/AUTO DIFF WBC: CPT

## 2024-10-09 NOTE — PROGRESS NOTES
piercings day of surgery.  All body piercing jewelry must be removed.             - If you have dentures they will be removed before going to the OR; we will provide a container.  If you wear contact lenses              or glasses they will be removed, bring a case for them or wear glasses day of surgery.              -If you have a Living Will and Durable Power of  for Healthcare, please bring in a copy to be scanned at registration.   -Notify your Surgeon if you develop any illness between now and the surgery date, cough, cold, fever, sore throat,    nausea, vomiting, etc.  Please notify your  surgeon if you experience dizziness, shortness of breath or blurred vision    between now & the time of your surgery.              -DO NOT shave your operative site less than 4 days prior to surgery. For face & neck surgery, men may use an electric                razor up to 2 days prior to surgery.   -To help prevent infection, change your sheets the night before surgery. Shower the night before and morning of    surgery using antibacterial soap (Dial or Safeguard) or Hibiclens soap as instructed by your surgeon.  Do not   apply lotion after shower or day of surgery.              -To provide excellent care visitors will be limited to two per room at any given time.              -Please bring your picture ID and insurance card for registration prior to arriving to second floor surgery department.              -If you use a CPAP/BiPAP please bring with you on the day of the surgery. If you use oxygen, please bring portable    tank with you.              -For your convenience [facility] has an outpatient pharmacy on site to fill your prescriptions prior to 5 pm.              -Bring a complete list of all your medications with name and dose including any supplements.             Visitor policy: May have 2-3 visitors in Surgery Waiting Room. Visiting hours are 8a-8p. Overnight visitors will be at the   discretion of the unit  nurse.   No visitors under the age of 14 permitted.     *Please call [facility] Preadmission Testing Department for any further questions  [phone] - GAETANO Yono      [facility] - MAIN ENTRANCE   [address] [place] [address]   [phone]        Email sent: 10/9/24

## 2024-10-10 LAB — BACTERIA UR CULT: NORMAL

## 2024-10-19 ENCOUNTER — ANESTHESIA EVENT (OUTPATIENT)
Dept: OPERATING ROOM | Age: 49
DRG: 219 | End: 2024-10-19
Payer: COMMERCIAL

## 2024-10-21 ENCOUNTER — ANESTHESIA (OUTPATIENT)
Dept: OPERATING ROOM | Age: 49
DRG: 219 | End: 2024-10-21
Payer: COMMERCIAL

## 2024-10-21 ENCOUNTER — APPOINTMENT (OUTPATIENT)
Dept: GENERAL RADIOLOGY | Age: 49
DRG: 219 | End: 2024-10-21
Attending: STUDENT IN AN ORGANIZED HEALTH CARE EDUCATION/TRAINING PROGRAM
Payer: COMMERCIAL

## 2024-10-21 ENCOUNTER — APPOINTMENT (OUTPATIENT)
Age: 49
DRG: 219 | End: 2024-10-21
Attending: STUDENT IN AN ORGANIZED HEALTH CARE EDUCATION/TRAINING PROGRAM
Payer: COMMERCIAL

## 2024-10-21 ENCOUNTER — HOSPITAL ENCOUNTER (INPATIENT)
Age: 49
LOS: 2 days | Discharge: ANOTHER HOSPITAL | DRG: 219 | End: 2024-10-23
Attending: STUDENT IN AN ORGANIZED HEALTH CARE EDUCATION/TRAINING PROGRAM | Admitting: STUDENT IN AN ORGANIZED HEALTH CARE EDUCATION/TRAINING PROGRAM
Payer: COMMERCIAL

## 2024-10-21 DIAGNOSIS — R57.0 CARDIOGENIC SHOCK: ICD-10-CM

## 2024-10-21 DIAGNOSIS — I05.9 MITRAL VALVE DISORDER: Primary | ICD-10-CM

## 2024-10-21 DIAGNOSIS — I34.0 MITRAL REGURGITATION: ICD-10-CM

## 2024-10-21 PROBLEM — I21.9: Status: ACTIVE | Noted: 2024-10-21

## 2024-10-21 LAB
ABO + RH BLD: NORMAL
ANION GAP SERPL CALCULATED.3IONS-SCNC: 8 MMOL/L (ref 3–16)
APTT BLD: 37.2 SEC (ref 22.1–36.4)
BASE EXCESS BLDA CALC-SCNC: -1 MMOL/L (ref -3–3)
BASE EXCESS BLDA CALC-SCNC: -1 MMOL/L (ref -3–3)
BASE EXCESS BLDA CALC-SCNC: -3 MMOL/L (ref -3–3)
BASE EXCESS BLDA CALC-SCNC: -4 MMOL/L (ref -3–3)
BASE EXCESS BLDA CALC-SCNC: -5 MMOL/L (ref -3–3)
BASE EXCESS BLDA CALC-SCNC: 0 MMOL/L (ref -3–3)
BASOPHILS # BLD: 0.1 K/UL (ref 0–0.2)
BASOPHILS NFR BLD: 0.4 %
BLD GP AB SCN SERPL QL: NORMAL
BLOOD BANK DISPENSE STATUS: NORMAL
BLOOD BANK DISPENSE STATUS: NORMAL
BLOOD BANK PRODUCT CODE: NORMAL
BLOOD BANK PRODUCT CODE: NORMAL
BPU ID: NORMAL
BPU ID: NORMAL
BUN SERPL-MCNC: 9 MG/DL (ref 7–20)
CA-I BLD-SCNC: 0.98 MMOL/L (ref 1.12–1.32)
CA-I BLD-SCNC: 1 MMOL/L (ref 1.12–1.32)
CA-I BLD-SCNC: 1.01 MMOL/L (ref 1.12–1.32)
CA-I BLD-SCNC: 1.05 MMOL/L (ref 1.12–1.32)
CA-I BLD-SCNC: 1.09 MMOL/L (ref 1.12–1.32)
CA-I BLD-SCNC: 1.09 MMOL/L (ref 1.12–1.32)
CA-I BLD-SCNC: 1.1 MMOL/L (ref 1.12–1.32)
CA-I BLD-SCNC: 1.13 MMOL/L (ref 1.12–1.32)
CA-I BLD-SCNC: 1.17 MMOL/L (ref 1.12–1.32)
CA-I BLD-SCNC: 1.21 MMOL/L (ref 1.12–1.32)
CA-I BLD-SCNC: 1.23 MMOL/L (ref 1.12–1.32)
CA-I BLD-SCNC: 1.23 MMOL/L (ref 1.12–1.32)
CA-I BLD-SCNC: 1.27 MMOL/L (ref 1.12–1.32)
CA-I BLD-SCNC: 1.29 MMOL/L (ref 1.12–1.32)
CA-I BLD-SCNC: 1.42 MMOL/L (ref 1.12–1.32)
CALCIUM SERPL-MCNC: 6.7 MG/DL (ref 8.3–10.6)
CHLORIDE SERPL-SCNC: 111 MMOL/L (ref 99–110)
CO2 BLDA-SCNC: 22 MMOL/L
CO2 BLDA-SCNC: 23 MMOL/L
CO2 BLDA-SCNC: 23 MMOL/L
CO2 BLDA-SCNC: 24 MMOL/L
CO2 BLDA-SCNC: 25 MMOL/L
CO2 BLDA-SCNC: 25 MMOL/L
CO2 BLDA-SCNC: 26 MMOL/L
CO2 BLDA-SCNC: 26 MMOL/L
CO2 BLDA-SCNC: 27 MMOL/L
CO2 SERPL-SCNC: 21 MMOL/L (ref 21–32)
CREAT SERPL-MCNC: 0.7 MG/DL (ref 0.9–1.3)
DEPRECATED RDW RBC AUTO: 13 % (ref 12.4–15.4)
DESCRIPTION BLOOD BANK: NORMAL
DESCRIPTION BLOOD BANK: NORMAL
ECHO BSA: 1.85 M2
ECHO LV EDV BP: 122 ML (ref 67–155)
ECHO LV EDV INDEX BP: 67 ML/M2
ECHO LV EF PHYSICIAN: 43 %
ECHO LV ESV BP: 50 ML (ref 22–58)
ECHO LV ESV INDEX BP: 27 ML/M2
ECHO RV BASAL DIMENSION: 4.6 CM
ECHO RV FREE WALL PEAK S': 3.2 CM/S
ECHO RV LONGITUDINAL DIMENSION: 7.4 CM
ECHO RV MID DIMENSION: 3.7 CM
ECHO RV TAPSE: 5 CM (ref 1.7–?)
EKG DIAGNOSIS: NORMAL
EKG Q-T INTERVAL: 396 MS
EKG QRS DURATION: 90 MS
EKG QTC CALCULATION (BAZETT): 451 MS
EKG R AXIS: 59 DEGREES
EKG T AXIS: 62 DEGREES
EKG VENTRICULAR RATE: 78 BPM
EOSINOPHIL # BLD: 0.2 K/UL (ref 0–0.6)
EOSINOPHIL NFR BLD: 0.8 %
FIBRINOGEN PPP-MCNC: 150 MG/DL (ref 227–534)
GFR SERPLBLD CREATININE-BSD FMLA CKD-EPI: >90 ML/MIN/{1.73_M2}
GLUCOSE BLD-MCNC: 100 MG/DL (ref 70–99)
GLUCOSE BLD-MCNC: 115 MG/DL (ref 70–99)
GLUCOSE BLD-MCNC: 120 MG/DL (ref 70–99)
GLUCOSE BLD-MCNC: 124 MG/DL (ref 70–99)
GLUCOSE BLD-MCNC: 125 MG/DL (ref 70–99)
GLUCOSE BLD-MCNC: 127 MG/DL (ref 70–99)
GLUCOSE BLD-MCNC: 128 MG/DL (ref 70–99)
GLUCOSE BLD-MCNC: 129 MG/DL (ref 70–99)
GLUCOSE BLD-MCNC: 131 MG/DL (ref 70–99)
GLUCOSE BLD-MCNC: 142 MG/DL (ref 70–99)
GLUCOSE BLD-MCNC: 142 MG/DL (ref 70–99)
GLUCOSE BLD-MCNC: 146 MG/DL (ref 70–99)
GLUCOSE BLD-MCNC: 147 MG/DL (ref 70–99)
GLUCOSE BLD-MCNC: 161 MG/DL (ref 70–99)
GLUCOSE BLD-MCNC: 164 MG/DL (ref 70–99)
GLUCOSE BLD-MCNC: 98 MG/DL (ref 70–99)
GLUCOSE SERPL-MCNC: 141 MG/DL (ref 70–99)
HCO3 BLDA-SCNC: 20.5 MMOL/L (ref 21–29)
HCO3 BLDA-SCNC: 20.5 MMOL/L (ref 21–29)
HCO3 BLDA-SCNC: 21.1 MMOL/L (ref 21–29)
HCO3 BLDA-SCNC: 21.2 MMOL/L (ref 21–29)
HCO3 BLDA-SCNC: 21.2 MMOL/L (ref 21–29)
HCO3 BLDA-SCNC: 21.4 MMOL/L (ref 21–29)
HCO3 BLDA-SCNC: 21.5 MMOL/L (ref 21–29)
HCO3 BLDA-SCNC: 22.7 MMOL/L (ref 21–29)
HCO3 BLDA-SCNC: 22.9 MMOL/L (ref 21–29)
HCO3 BLDA-SCNC: 23.3 MMOL/L (ref 21–29)
HCO3 BLDA-SCNC: 23.4 MMOL/L (ref 21–29)
HCO3 BLDA-SCNC: 23.5 MMOL/L (ref 21–29)
HCO3 BLDA-SCNC: 24.4 MMOL/L (ref 21–29)
HCO3 BLDA-SCNC: 25.1 MMOL/L (ref 21–29)
HCO3 BLDA-SCNC: 25.4 MMOL/L (ref 21–29)
HCT VFR BLD AUTO: 24 % (ref 40.5–52.5)
HCT VFR BLD AUTO: 24 % (ref 40.5–52.5)
HCT VFR BLD AUTO: 27 % (ref 40.5–52.5)
HCT VFR BLD AUTO: 27 % (ref 40.5–52.5)
HCT VFR BLD AUTO: 29 % (ref 40.5–52.5)
HCT VFR BLD AUTO: 30 % (ref 40.5–52.5)
HCT VFR BLD AUTO: 31 % (ref 40.5–52.5)
HCT VFR BLD AUTO: 32 % (ref 40.5–52.5)
HCT VFR BLD AUTO: 33 % (ref 40.5–52.5)
HCT VFR BLD AUTO: 33.3 % (ref 40.5–52.5)
HCT VFR BLD AUTO: 40 % (ref 40.5–52.5)
HGB BLD CALC-MCNC: 10.2 GM/DL (ref 13.5–17.5)
HGB BLD CALC-MCNC: 10.4 GM/DL (ref 13.5–17.5)
HGB BLD CALC-MCNC: 10.7 GM/DL (ref 13.5–17.5)
HGB BLD CALC-MCNC: 10.8 GM/DL (ref 13.5–17.5)
HGB BLD CALC-MCNC: 11 GM/DL (ref 13.5–17.5)
HGB BLD CALC-MCNC: 11 GM/DL (ref 13.5–17.5)
HGB BLD CALC-MCNC: 11.4 GM/DL (ref 13.5–17.5)
HGB BLD CALC-MCNC: 13.5 GM/DL (ref 13.5–17.5)
HGB BLD CALC-MCNC: 8.2 GM/DL (ref 13.5–17.5)
HGB BLD CALC-MCNC: 8.2 GM/DL (ref 13.5–17.5)
HGB BLD CALC-MCNC: 9.2 GM/DL (ref 13.5–17.5)
HGB BLD CALC-MCNC: 9.3 GM/DL (ref 13.5–17.5)
HGB BLD CALC-MCNC: 9.7 GM/DL (ref 13.5–17.5)
HGB BLD CALC-MCNC: 9.9 GM/DL (ref 13.5–17.5)
HGB BLD CALC-MCNC: 9.9 GM/DL (ref 13.5–17.5)
HGB BLD-MCNC: 10.9 G/DL (ref 13.5–17.5)
INR PPP: 1.52 (ref 0.85–1.15)
LACTATE BLD-SCNC: 0.86 MMOL/L (ref 0.4–2)
LACTATE BLD-SCNC: 1.46 MMOL/L (ref 0.4–2)
LACTATE BLD-SCNC: 1.79 MMOL/L (ref 0.4–2)
LACTATE BLD-SCNC: 2.05 MMOL/L (ref 0.4–2)
LACTATE BLD-SCNC: 2.4 MMOL/L (ref 0.4–2)
LACTATE BLD-SCNC: 2.66 MMOL/L (ref 0.4–2)
LACTATE BLD-SCNC: 2.72 MMOL/L (ref 0.4–2)
LACTATE BLD-SCNC: 3.01 MMOL/L (ref 0.4–2)
LACTATE BLD-SCNC: 3.34 MMOL/L (ref 0.4–2)
LYMPHOCYTES # BLD: 2.3 K/UL (ref 1–5.1)
LYMPHOCYTES NFR BLD: 10.2 %
MAGNESIUM SERPL-MCNC: 2.5 MG/DL (ref 1.8–2.4)
MCH RBC QN AUTO: 29.2 PG (ref 26–34)
MCHC RBC AUTO-ENTMCNC: 32.7 G/DL (ref 31–36)
MCV RBC AUTO: 89.1 FL (ref 80–100)
MONOCYTES # BLD: 1.1 K/UL (ref 0–1.3)
MONOCYTES NFR BLD: 5 %
NEUTROPHILS # BLD: 18.6 K/UL (ref 1.7–7.7)
NEUTROPHILS NFR BLD: 83.6 %
PCO2 BLDA: 31.6 MM HG (ref 35–45)
PCO2 BLDA: 32.1 MM HG (ref 35–45)
PCO2 BLDA: 33.9 MM HG (ref 35–45)
PCO2 BLDA: 34 MM HG (ref 35–45)
PCO2 BLDA: 35.5 MM HG (ref 35–45)
PCO2 BLDA: 35.8 MM HG (ref 35–45)
PCO2 BLDA: 36.2 MM HG (ref 35–45)
PCO2 BLDA: 36.7 MM HG (ref 35–45)
PCO2 BLDA: 37.5 MM HG (ref 35–45)
PCO2 BLDA: 40.4 MM HG (ref 35–45)
PCO2 BLDA: 41.7 MM HG (ref 35–45)
PCO2 BLDA: 41.8 MM HG (ref 35–45)
PCO2 BLDA: 44.4 MM HG (ref 35–45)
PCO2 BLDA: 47.2 MM HG (ref 35–45)
PCO2 BLDA: 48.4 MM HG (ref 35–45)
PERFORMED ON: ABNORMAL
PH BLDA: 7.29 [PH] (ref 7.35–7.45)
PH BLDA: 7.32 [PH] (ref 7.35–7.45)
PH BLDA: 7.32 [PH] (ref 7.35–7.45)
PH BLDA: 7.34 [PH] (ref 7.35–7.45)
PH BLDA: 7.35 [PH] (ref 7.35–7.45)
PH BLDA: 7.37 [PH] (ref 7.35–7.45)
PH BLDA: 7.37 [PH] (ref 7.35–7.45)
PH BLDA: 7.38 [PH] (ref 7.35–7.45)
PH BLDA: 7.4 [PH] (ref 7.35–7.45)
PH BLDA: 7.41 [PH] (ref 7.35–7.45)
PH BLDA: 7.41 [PH] (ref 7.35–7.45)
PH BLDA: 7.42 [PH] (ref 7.35–7.45)
PH BLDA: 7.42 [PH] (ref 7.35–7.45)
PH BLDA: 7.44 [PH] (ref 7.35–7.45)
PH BLDA: 7.44 [PH] (ref 7.35–7.45)
PLATELET # BLD AUTO: 171 K/UL (ref 135–450)
PMV BLD AUTO: 7.1 FL (ref 5–10.5)
PO2 BLDA: 106.5 MM HG (ref 75–108)
PO2 BLDA: 112.6 MM HG (ref 75–108)
PO2 BLDA: 181.6 MM HG (ref 75–108)
PO2 BLDA: 187.5 MM HG (ref 75–108)
PO2 BLDA: 188.9 MM HG (ref 75–108)
PO2 BLDA: 191.2 MM HG (ref 75–108)
PO2 BLDA: 194.5 MM HG (ref 75–108)
PO2 BLDA: 242.2 MM HG (ref 75–108)
PO2 BLDA: 383.7 MM HG (ref 75–108)
PO2 BLDA: 392.2 MM HG (ref 75–108)
PO2 BLDA: 510.2 MM HG (ref 75–108)
PO2 BLDA: 593.2 MM HG (ref 75–108)
PO2 BLDA: 605.3 MM HG (ref 75–108)
PO2 BLDA: 623.9 MM HG (ref 75–108)
PO2 BLDA: 651.2 MM HG (ref 75–108)
POC SAMPLE TYPE: ABNORMAL
POTASSIUM BLD-SCNC: 3.3 MMOL/L (ref 3.5–5.1)
POTASSIUM BLD-SCNC: 3.8 MMOL/L (ref 3.5–5.1)
POTASSIUM BLD-SCNC: 3.8 MMOL/L (ref 3.5–5.1)
POTASSIUM BLD-SCNC: 3.9 MMOL/L (ref 3.5–5.1)
POTASSIUM BLD-SCNC: 4 MMOL/L (ref 3.5–5.1)
POTASSIUM BLD-SCNC: 4.2 MMOL/L (ref 3.5–5.1)
POTASSIUM BLD-SCNC: 4.2 MMOL/L (ref 3.5–5.1)
POTASSIUM BLD-SCNC: 4.4 MMOL/L (ref 3.5–5.1)
POTASSIUM BLD-SCNC: 4.5 MMOL/L (ref 3.5–5.1)
POTASSIUM BLD-SCNC: 4.5 MMOL/L (ref 3.5–5.1)
POTASSIUM BLD-SCNC: 4.6 MMOL/L (ref 3.5–5.1)
POTASSIUM BLD-SCNC: 4.7 MMOL/L (ref 3.5–5.1)
POTASSIUM BLD-SCNC: 5.1 MMOL/L (ref 3.5–5.1)
POTASSIUM BLD-SCNC: 5.4 MMOL/L (ref 3.5–5.1)
POTASSIUM BLD-SCNC: 5.8 MMOL/L (ref 3.5–5.1)
POTASSIUM SERPL-SCNC: 4.3 MMOL/L (ref 3.5–5.1)
POTASSIUM SERPL-SCNC: 4.3 MMOL/L (ref 3.5–5.1)
PROTHROMBIN TIME: 18.5 SEC (ref 11.9–14.9)
RBC # BLD AUTO: 3.73 M/UL (ref 4.2–5.9)
SAO2 % BLDA: 100 % (ref 93–100)
SAO2 % BLDA: 98 % (ref 93–100)
SAO2 % BLDA: 99 % (ref 93–100)
SODIUM BLD-SCNC: 136 MMOL/L (ref 136–145)
SODIUM BLD-SCNC: 137 MMOL/L (ref 136–145)
SODIUM BLD-SCNC: 138 MMOL/L (ref 136–145)
SODIUM BLD-SCNC: 140 MMOL/L (ref 136–145)
SODIUM BLD-SCNC: 142 MMOL/L (ref 136–145)
SODIUM BLD-SCNC: 143 MMOL/L (ref 136–145)
SODIUM BLD-SCNC: 144 MMOL/L (ref 136–145)
SODIUM BLD-SCNC: 147 MMOL/L (ref 136–145)
SODIUM BLD-SCNC: 148 MMOL/L (ref 136–145)
SODIUM BLD-SCNC: 149 MMOL/L (ref 136–145)
SODIUM SERPL-SCNC: 140 MMOL/L (ref 136–145)
WBC # BLD AUTO: 22.2 K/UL (ref 4–11)

## 2024-10-21 PROCEDURE — 2100000000 HC CCU R&B

## 2024-10-21 PROCEDURE — 3600000018 HC SURGERY OHS ADDTL 15MIN: Performed by: STUDENT IN AN ORGANIZED HEALTH CARE EDUCATION/TRAINING PROGRAM

## 2024-10-21 PROCEDURE — C1713 ANCHOR/SCREW BN/BN,TIS/BN: HCPCS | Performed by: STUDENT IN AN ORGANIZED HEALTH CARE EDUCATION/TRAINING PROGRAM

## 2024-10-21 PROCEDURE — 6360000002 HC RX W HCPCS

## 2024-10-21 PROCEDURE — C8924 2D TTE W OR W/O FOL W/CON,FU: HCPCS

## 2024-10-21 PROCEDURE — 86900 BLOOD TYPING SEROLOGIC ABO: CPT

## 2024-10-21 PROCEDURE — C9290 INJ, BUPIVACAINE LIPOSOME: HCPCS | Performed by: STUDENT IN AN ORGANIZED HEALTH CARE EDUCATION/TRAINING PROGRAM

## 2024-10-21 PROCEDURE — 3600000008 HC SURGERY OHS BASE: Performed by: STUDENT IN AN ORGANIZED HEALTH CARE EDUCATION/TRAINING PROGRAM

## 2024-10-21 PROCEDURE — P9045 ALBUMIN (HUMAN), 5%, 250 ML: HCPCS | Performed by: STUDENT IN AN ORGANIZED HEALTH CARE EDUCATION/TRAINING PROGRAM

## 2024-10-21 PROCEDURE — 85347 COAGULATION TIME ACTIVATED: CPT

## 2024-10-21 PROCEDURE — 85384 FIBRINOGEN ACTIVITY: CPT

## 2024-10-21 PROCEDURE — 2720000010 HC SURG SUPPLY STERILE: Performed by: STUDENT IN AN ORGANIZED HEALTH CARE EDUCATION/TRAINING PROGRAM

## 2024-10-21 PROCEDURE — 93005 ELECTROCARDIOGRAM TRACING: CPT | Performed by: STUDENT IN AN ORGANIZED HEALTH CARE EDUCATION/TRAINING PROGRAM

## 2024-10-21 PROCEDURE — 85610 PROTHROMBIN TIME: CPT

## 2024-10-21 PROCEDURE — 5A1945Z RESPIRATORY VENTILATION, 24-96 CONSECUTIVE HOURS: ICD-10-PCS | Performed by: STUDENT IN AN ORGANIZED HEALTH CARE EDUCATION/TRAINING PROGRAM

## 2024-10-21 PROCEDURE — 93325 DOPPLER ECHO COLOR FLOW MAPG: CPT | Performed by: INTERNAL MEDICINE

## 2024-10-21 PROCEDURE — 82803 BLOOD GASES ANY COMBINATION: CPT

## 2024-10-21 PROCEDURE — 6360000002 HC RX W HCPCS: Performed by: STUDENT IN AN ORGANIZED HEALTH CARE EDUCATION/TRAINING PROGRAM

## 2024-10-21 PROCEDURE — 83735 ASSAY OF MAGNESIUM: CPT

## 2024-10-21 PROCEDURE — 6370000000 HC RX 637 (ALT 250 FOR IP): Performed by: STUDENT IN AN ORGANIZED HEALTH CARE EDUCATION/TRAINING PROGRAM

## 2024-10-21 PROCEDURE — 71045 X-RAY EXAM CHEST 1 VIEW: CPT

## 2024-10-21 PROCEDURE — 3700000000 HC ANESTHESIA ATTENDED CARE: Performed by: STUDENT IN AN ORGANIZED HEALTH CARE EDUCATION/TRAINING PROGRAM

## 2024-10-21 PROCEDURE — 85014 HEMATOCRIT: CPT

## 2024-10-21 PROCEDURE — 85025 COMPLETE CBC W/AUTO DIFF WBC: CPT

## 2024-10-21 PROCEDURE — 2500000003 HC RX 250 WO HCPCS: Performed by: STUDENT IN AN ORGANIZED HEALTH CARE EDUCATION/TRAINING PROGRAM

## 2024-10-21 PROCEDURE — C1786 PMKR, SINGLE, RATE-RESP: HCPCS | Performed by: STUDENT IN AN ORGANIZED HEALTH CARE EDUCATION/TRAINING PROGRAM

## 2024-10-21 PROCEDURE — 82947 ASSAY GLUCOSE BLOOD QUANT: CPT

## 2024-10-21 PROCEDURE — C1751 CATH, INF, PER/CENT/MIDLINE: HCPCS | Performed by: STUDENT IN AN ORGANIZED HEALTH CARE EDUCATION/TRAINING PROGRAM

## 2024-10-21 PROCEDURE — 2580000003 HC RX 258: Performed by: ANESTHESIOLOGY

## 2024-10-21 PROCEDURE — 02L70CK OCCLUSION OF LEFT ATRIAL APPENDAGE WITH EXTRALUMINAL DEVICE, OPEN APPROACH: ICD-10-PCS | Performed by: STUDENT IN AN ORGANIZED HEALTH CARE EDUCATION/TRAINING PROGRAM

## 2024-10-21 PROCEDURE — 94002 VENT MGMT INPAT INIT DAY: CPT

## 2024-10-21 PROCEDURE — 2780000010 HC IMPLANT OTHER: Performed by: STUDENT IN AN ORGANIZED HEALTH CARE EDUCATION/TRAINING PROGRAM

## 2024-10-21 PROCEDURE — 94645 CONT INHLJ TX EACH ADDL HOUR: CPT

## 2024-10-21 PROCEDURE — 36430 TRANSFUSION BLD/BLD COMPNT: CPT

## 2024-10-21 PROCEDURE — 6360000004 HC RX CONTRAST MEDICATION: Performed by: STUDENT IN AN ORGANIZED HEALTH CARE EDUCATION/TRAINING PROGRAM

## 2024-10-21 PROCEDURE — 2580000003 HC RX 258

## 2024-10-21 PROCEDURE — 85730 THROMBOPLASTIN TIME PARTIAL: CPT

## 2024-10-21 PROCEDURE — 93325 DOPPLER ECHO COLOR FLOW MAPG: CPT

## 2024-10-21 PROCEDURE — 3E033XZ INTRODUCTION OF VASOPRESSOR INTO PERIPHERAL VEIN, PERCUTANEOUS APPROACH: ICD-10-PCS | Performed by: STUDENT IN AN ORGANIZED HEALTH CARE EDUCATION/TRAINING PROGRAM

## 2024-10-21 PROCEDURE — C1769 GUIDE WIRE: HCPCS | Performed by: STUDENT IN AN ORGANIZED HEALTH CARE EDUCATION/TRAINING PROGRAM

## 2024-10-21 PROCEDURE — C1729 CATH, DRAINAGE: HCPCS | Performed by: STUDENT IN AN ORGANIZED HEALTH CARE EDUCATION/TRAINING PROGRAM

## 2024-10-21 PROCEDURE — 6360000002 HC RX W HCPCS: Performed by: ANESTHESIOLOGY

## 2024-10-21 PROCEDURE — 2580000003 HC RX 258: Performed by: STUDENT IN AN ORGANIZED HEALTH CARE EDUCATION/TRAINING PROGRAM

## 2024-10-21 PROCEDURE — 93010 ELECTROCARDIOGRAM REPORT: CPT | Performed by: INTERNAL MEDICINE

## 2024-10-21 PROCEDURE — 86850 RBC ANTIBODY SCREEN: CPT

## 2024-10-21 PROCEDURE — 5A1221Z PERFORMANCE OF CARDIAC OUTPUT, CONTINUOUS: ICD-10-PCS | Performed by: STUDENT IN AN ORGANIZED HEALTH CARE EDUCATION/TRAINING PROGRAM

## 2024-10-21 PROCEDURE — 2709999900 HC NON-CHARGEABLE SUPPLY: Performed by: STUDENT IN AN ORGANIZED HEALTH CARE EDUCATION/TRAINING PROGRAM

## 2024-10-21 PROCEDURE — 93321 DOPPLER ECHO F-UP/LMTD STD: CPT | Performed by: INTERNAL MEDICINE

## 2024-10-21 PROCEDURE — 86923 COMPATIBILITY TEST ELECTRIC: CPT

## 2024-10-21 PROCEDURE — P9012 CRYOPRECIPITATE EACH UNIT: HCPCS

## 2024-10-21 PROCEDURE — 3700000001 HC ADD 15 MINUTES (ANESTHESIA): Performed by: STUDENT IN AN ORGANIZED HEALTH CARE EDUCATION/TRAINING PROGRAM

## 2024-10-21 PROCEDURE — 80048 BASIC METABOLIC PNL TOTAL CA: CPT

## 2024-10-21 PROCEDURE — 6370000000 HC RX 637 (ALT 250 FOR IP): Performed by: ANESTHESIOLOGY

## 2024-10-21 PROCEDURE — 88300 SURGICAL PATH GROSS: CPT

## 2024-10-21 PROCEDURE — 84295 ASSAY OF SERUM SODIUM: CPT

## 2024-10-21 PROCEDURE — 33426 REPAIR OF MITRAL VALVE: CPT | Performed by: THORACIC SURGERY (CARDIOTHORACIC VASCULAR SURGERY)

## 2024-10-21 PROCEDURE — 94761 N-INVAS EAR/PLS OXIMETRY MLT: CPT

## 2024-10-21 PROCEDURE — 0BH17EZ INSERTION OF ENDOTRACHEAL AIRWAY INTO TRACHEA, VIA NATURAL OR ARTIFICIAL OPENING: ICD-10-PCS | Performed by: STUDENT IN AN ORGANIZED HEALTH CARE EDUCATION/TRAINING PROGRAM

## 2024-10-21 PROCEDURE — 74018 RADEX ABDOMEN 1 VIEW: CPT

## 2024-10-21 PROCEDURE — 83605 ASSAY OF LACTIC ACID: CPT

## 2024-10-21 PROCEDURE — 84132 ASSAY OF SERUM POTASSIUM: CPT

## 2024-10-21 PROCEDURE — 93308 TTE F-UP OR LMTD: CPT | Performed by: INTERNAL MEDICINE

## 2024-10-21 PROCEDURE — 2700000000 HC OXYGEN THERAPY PER DAY

## 2024-10-21 PROCEDURE — C9290 INJ, BUPIVACAINE LIPOSOME: HCPCS | Performed by: ANESTHESIOLOGY

## 2024-10-21 PROCEDURE — 93503 INSERT/PLACE HEART CATHETER: CPT

## 2024-10-21 PROCEDURE — 76942 ECHO GUIDE FOR BIOPSY: CPT | Performed by: ANESTHESIOLOGY

## 2024-10-21 PROCEDURE — 02QG0ZZ REPAIR MITRAL VALVE, OPEN APPROACH: ICD-10-PCS | Performed by: STUDENT IN AN ORGANIZED HEALTH CARE EDUCATION/TRAINING PROGRAM

## 2024-10-21 PROCEDURE — 37799 UNLISTED PX VASCULAR SURGERY: CPT

## 2024-10-21 PROCEDURE — 94644 CONT INHLJ TX 1ST HOUR: CPT

## 2024-10-21 PROCEDURE — C1889 IMPLANT/INSERT DEVICE, NOC: HCPCS | Performed by: STUDENT IN AN ORGANIZED HEALTH CARE EDUCATION/TRAINING PROGRAM

## 2024-10-21 PROCEDURE — 86901 BLOOD TYPING SEROLOGIC RH(D): CPT

## 2024-10-21 PROCEDURE — B24BZZ4 ULTRASONOGRAPHY OF HEART WITH AORTA, TRANSESOPHAGEAL: ICD-10-PCS | Performed by: STUDENT IN AN ORGANIZED HEALTH CARE EDUCATION/TRAINING PROGRAM

## 2024-10-21 PROCEDURE — 99291 CRITICAL CARE FIRST HOUR: CPT | Performed by: INTERNAL MEDICINE

## 2024-10-21 PROCEDURE — 82330 ASSAY OF CALCIUM: CPT

## 2024-10-21 PROCEDURE — 2500000003 HC RX 250 WO HCPCS: Performed by: ANESTHESIOLOGY

## 2024-10-21 PROCEDURE — 33426 REPAIR OF MITRAL VALVE: CPT | Performed by: STUDENT IN AN ORGANIZED HEALTH CARE EDUCATION/TRAINING PROGRAM

## 2024-10-21 DEVICE — PLATE BNE L208MM THK2.1/2.5MM 14 H THOR STRNL PEEK LOK LSS: Type: IMPLANTABLE DEVICE | Site: STERNUM | Status: FUNCTIONAL

## 2024-10-21 DEVICE — SCREW BNE L13MM DIA2.3MM THOR STRNL TI LOK DRL FREE LEV 1: Type: IMPLANTABLE DEVICE | Site: STERNUM | Status: FUNCTIONAL

## 2024-10-21 DEVICE — APPLIER CLIP MED SUTURE LESS 45 MM SYS 1 HND STRL ATRICLIP FLX V: Type: IMPLANTABLE DEVICE | Site: HEART | Status: FUNCTIONAL

## 2024-10-21 DEVICE — SCREW BNE L15MM DIA2.3MM THOR STRNL TI LOK DRL FREE LEV 1: Type: IMPLANTABLE DEVICE | Site: STERNUM | Status: FUNCTIONAL

## 2024-10-21 RX ORDER — OXYCODONE HYDROCHLORIDE 5 MG/1
5 TABLET ORAL EVERY 4 HOURS PRN
Status: DISCONTINUED | OUTPATIENT
Start: 2024-10-21 | End: 2024-10-23 | Stop reason: HOSPADM

## 2024-10-21 RX ORDER — ALBUMIN, HUMAN INJ 5% 5 %
25 SOLUTION INTRAVENOUS PRN
Status: DISPENSED | OUTPATIENT
Start: 2024-10-21 | End: 2024-10-23

## 2024-10-21 RX ORDER — NOREPINEPHRINE BITARTRATE 1 MG/ML
INJECTION, SOLUTION INTRAVENOUS
Status: DISCONTINUED | OUTPATIENT
Start: 2024-10-21 | End: 2024-10-21 | Stop reason: SDUPTHER

## 2024-10-21 RX ORDER — FUROSEMIDE 40 MG/1
40 TABLET ORAL 2 TIMES DAILY
Status: DISCONTINUED | OUTPATIENT
Start: 2024-10-24 | End: 2024-10-23

## 2024-10-21 RX ORDER — MIDAZOLAM HYDROCHLORIDE 1 MG/ML
1 INJECTION, SOLUTION INTRAMUSCULAR; INTRAVENOUS
Status: DISPENSED | OUTPATIENT
Start: 2024-10-21 | End: 2024-10-22

## 2024-10-21 RX ORDER — INSULIN GLARGINE 100 [IU]/ML
0.15 INJECTION, SOLUTION SUBCUTANEOUS NIGHTLY
Status: DISCONTINUED | OUTPATIENT
Start: 2024-10-23 | End: 2024-10-23 | Stop reason: HOSPADM

## 2024-10-21 RX ORDER — SODIUM CHLORIDE 0.9 % (FLUSH) 0.9 %
5-40 SYRINGE (ML) INJECTION PRN
Status: DISCONTINUED | OUTPATIENT
Start: 2024-10-21 | End: 2024-10-23 | Stop reason: HOSPADM

## 2024-10-21 RX ORDER — BUPIVACAINE HYDROCHLORIDE 5 MG/ML
INJECTION, SOLUTION EPIDURAL; INTRACAUDAL
Status: COMPLETED | OUTPATIENT
Start: 2024-10-21 | End: 2024-10-21

## 2024-10-21 RX ORDER — METOPROLOL TARTRATE 25 MG/1
12.5 TABLET, FILM COATED ORAL 2 TIMES DAILY
Status: DISCONTINUED | OUTPATIENT
Start: 2024-10-22 | End: 2024-10-23 | Stop reason: HOSPADM

## 2024-10-21 RX ORDER — HEPARIN SODIUM 1000 [USP'U]/ML
INJECTION, SOLUTION INTRAVENOUS; SUBCUTANEOUS
Status: DISCONTINUED | OUTPATIENT
Start: 2024-10-21 | End: 2024-10-21 | Stop reason: SDUPTHER

## 2024-10-21 RX ORDER — ACETAMINOPHEN 500 MG
1000 TABLET ORAL ONCE
Status: COMPLETED | OUTPATIENT
Start: 2024-10-21 | End: 2024-10-21

## 2024-10-21 RX ORDER — MIDAZOLAM HYDROCHLORIDE 1 MG/ML
INJECTION, SOLUTION INTRAMUSCULAR; INTRAVENOUS
Status: DISCONTINUED | OUTPATIENT
Start: 2024-10-21 | End: 2024-10-21 | Stop reason: SDUPTHER

## 2024-10-21 RX ORDER — MORPHINE SULFATE 2 MG/ML
2 INJECTION, SOLUTION INTRAMUSCULAR; INTRAVENOUS
Status: DISCONTINUED | OUTPATIENT
Start: 2024-10-21 | End: 2024-10-23 | Stop reason: HOSPADM

## 2024-10-21 RX ORDER — PROTAMINE SULFATE 10 MG/ML
INJECTION, SOLUTION INTRAVENOUS
Status: DISCONTINUED | OUTPATIENT
Start: 2024-10-21 | End: 2024-10-21 | Stop reason: SDUPTHER

## 2024-10-21 RX ORDER — PROTAMINE SULFATE 10 MG/ML
50 INJECTION, SOLUTION INTRAVENOUS
Status: ACTIVE | OUTPATIENT
Start: 2024-10-21 | End: 2024-10-22

## 2024-10-21 RX ORDER — SODIUM CHLORIDE 0.9 % (FLUSH) 0.9 %
5-40 SYRINGE (ML) INJECTION PRN
Status: DISCONTINUED | OUTPATIENT
Start: 2024-10-21 | End: 2024-10-21 | Stop reason: HOSPADM

## 2024-10-21 RX ORDER — SODIUM CHLORIDE 9 MG/ML
INJECTION, SOLUTION INTRAVENOUS PRN
Status: DISCONTINUED | OUTPATIENT
Start: 2024-10-21 | End: 2024-10-23 | Stop reason: HOSPADM

## 2024-10-21 RX ORDER — POTASSIUM CHLORIDE 750 MG/1
10 TABLET, EXTENDED RELEASE ORAL
Status: DISCONTINUED | OUTPATIENT
Start: 2024-10-22 | End: 2024-10-23

## 2024-10-21 RX ORDER — SODIUM CHLORIDE 9 MG/ML
INJECTION, SOLUTION INTRAVENOUS PRN
Status: DISCONTINUED | OUTPATIENT
Start: 2024-10-21 | End: 2024-10-21 | Stop reason: HOSPADM

## 2024-10-21 RX ORDER — DEXTROSE MONOHYDRATE AND SODIUM CHLORIDE 5; .45 G/100ML; G/100ML
INJECTION, SOLUTION INTRAVENOUS CONTINUOUS
Status: ACTIVE | OUTPATIENT
Start: 2024-10-21 | End: 2024-10-22

## 2024-10-21 RX ORDER — LANOLIN ALCOHOL/MO/W.PET/CERES
400 CREAM (GRAM) TOPICAL 2 TIMES DAILY
Status: DISCONTINUED | OUTPATIENT
Start: 2024-10-22 | End: 2024-10-23 | Stop reason: HOSPADM

## 2024-10-21 RX ORDER — LIDOCAINE HYDROCHLORIDE 10 MG/ML
0.3 INJECTION, SOLUTION EPIDURAL; INFILTRATION; INTRACAUDAL; PERINEURAL
Status: DISCONTINUED | OUTPATIENT
Start: 2024-10-21 | End: 2024-10-21 | Stop reason: HOSPADM

## 2024-10-21 RX ORDER — MAGNESIUM SULFATE IN WATER 40 MG/ML
2000 INJECTION, SOLUTION INTRAVENOUS PRN
Status: DISCONTINUED | OUTPATIENT
Start: 2024-10-21 | End: 2024-10-23 | Stop reason: HOSPADM

## 2024-10-21 RX ORDER — METOPROLOL TARTRATE 1 MG/ML
2.5 INJECTION, SOLUTION INTRAVENOUS EVERY 10 MIN PRN
Status: DISCONTINUED | OUTPATIENT
Start: 2024-10-21 | End: 2024-10-23

## 2024-10-21 RX ORDER — BISACODYL 5 MG/1
5 TABLET, DELAYED RELEASE ORAL DAILY
Status: DISCONTINUED | OUTPATIENT
Start: 2024-10-22 | End: 2024-10-23 | Stop reason: HOSPADM

## 2024-10-21 RX ORDER — ONDANSETRON 2 MG/ML
4 INJECTION INTRAMUSCULAR; INTRAVENOUS EVERY 6 HOURS PRN
Status: DISCONTINUED | OUTPATIENT
Start: 2024-10-21 | End: 2024-10-23 | Stop reason: HOSPADM

## 2024-10-21 RX ORDER — SODIUM CHLORIDE 0.9 % (FLUSH) 0.9 %
5-40 SYRINGE (ML) INJECTION EVERY 12 HOURS SCHEDULED
Status: DISCONTINUED | OUTPATIENT
Start: 2024-10-21 | End: 2024-10-23 | Stop reason: HOSPADM

## 2024-10-21 RX ORDER — ATORVASTATIN CALCIUM 40 MG/1
40 TABLET, FILM COATED ORAL NIGHTLY
Status: DISCONTINUED | OUTPATIENT
Start: 2024-10-22 | End: 2024-10-23 | Stop reason: HOSPADM

## 2024-10-21 RX ORDER — ALBUTEROL SULFATE 0.83 MG/ML
2.5 SOLUTION RESPIRATORY (INHALATION)
Status: DISCONTINUED | OUTPATIENT
Start: 2024-10-21 | End: 2024-10-23 | Stop reason: HOSPADM

## 2024-10-21 RX ORDER — CHLORHEXIDINE GLUCONATE ORAL RINSE 1.2 MG/ML
15 SOLUTION DENTAL 2 TIMES DAILY
Status: DISCONTINUED | OUTPATIENT
Start: 2024-10-21 | End: 2024-10-23 | Stop reason: HOSPADM

## 2024-10-21 RX ORDER — ONDANSETRON 4 MG/1
4 TABLET, ORALLY DISINTEGRATING ORAL EVERY 8 HOURS PRN
Status: DISCONTINUED | OUTPATIENT
Start: 2024-10-21 | End: 2024-10-23 | Stop reason: HOSPADM

## 2024-10-21 RX ORDER — FONDAPARINUX SODIUM 2.5 MG/.5ML
2.5 INJECTION SUBCUTANEOUS DAILY
Status: DISCONTINUED | OUTPATIENT
Start: 2024-10-22 | End: 2024-10-23 | Stop reason: HOSPADM

## 2024-10-21 RX ORDER — METOPROLOL TARTRATE 25 MG/1
12.5 TABLET, FILM COATED ORAL ONCE
Status: COMPLETED | OUTPATIENT
Start: 2024-10-21 | End: 2024-10-21

## 2024-10-21 RX ORDER — ASPIRIN 81 MG/1
81 TABLET, CHEWABLE ORAL DAILY
Status: DISCONTINUED | OUTPATIENT
Start: 2024-10-22 | End: 2024-10-23 | Stop reason: HOSPADM

## 2024-10-21 RX ORDER — DEXMEDETOMIDINE HYDROCHLORIDE 4 UG/ML
.1-1.5 INJECTION, SOLUTION INTRAVENOUS CONTINUOUS
Status: DISCONTINUED | OUTPATIENT
Start: 2024-10-21 | End: 2024-10-23 | Stop reason: HOSPADM

## 2024-10-21 RX ORDER — POTASSIUM CHLORIDE 29.8 MG/ML
20 INJECTION INTRAVENOUS PRN
Status: DISCONTINUED | OUTPATIENT
Start: 2024-10-21 | End: 2024-10-23 | Stop reason: HOSPADM

## 2024-10-21 RX ORDER — GLUCAGON 1 MG/ML
1 KIT INJECTION PRN
Status: DISCONTINUED | OUTPATIENT
Start: 2024-10-21 | End: 2024-10-23 | Stop reason: HOSPADM

## 2024-10-21 RX ORDER — SODIUM CHLORIDE 0.9 % (FLUSH) 0.9 %
5-40 SYRINGE (ML) INJECTION EVERY 12 HOURS SCHEDULED
Status: DISCONTINUED | OUTPATIENT
Start: 2024-10-21 | End: 2024-10-21 | Stop reason: HOSPADM

## 2024-10-21 RX ORDER — HYDRALAZINE HYDROCHLORIDE 20 MG/ML
5 INJECTION INTRAMUSCULAR; INTRAVENOUS EVERY 5 MIN PRN
Status: DISCONTINUED | OUTPATIENT
Start: 2024-10-21 | End: 2024-10-23 | Stop reason: HOSPADM

## 2024-10-21 RX ORDER — NOREPINEPHRINE BITARTRATE 0.06 MG/ML
1-30 INJECTION, SOLUTION INTRAVENOUS CONTINUOUS PRN
Status: DISCONTINUED | OUTPATIENT
Start: 2024-10-21 | End: 2024-10-23 | Stop reason: HOSPADM

## 2024-10-21 RX ORDER — ROCURONIUM BROMIDE 10 MG/ML
INJECTION, SOLUTION INTRAVENOUS
Status: DISCONTINUED | OUTPATIENT
Start: 2024-10-21 | End: 2024-10-21 | Stop reason: SDUPTHER

## 2024-10-21 RX ORDER — INSULIN LISPRO 100 [IU]/ML
0-12 INJECTION, SOLUTION INTRAVENOUS; SUBCUTANEOUS
Status: DISCONTINUED | OUTPATIENT
Start: 2024-10-24 | End: 2024-10-23 | Stop reason: HOSPADM

## 2024-10-21 RX ORDER — SODIUM CHLORIDE, SODIUM LACTATE, POTASSIUM CHLORIDE, CALCIUM CHLORIDE 600; 310; 30; 20 MG/100ML; MG/100ML; MG/100ML; MG/100ML
INJECTION, SOLUTION INTRAVENOUS
Status: DISCONTINUED | OUTPATIENT
Start: 2024-10-21 | End: 2024-10-21 | Stop reason: SDUPTHER

## 2024-10-21 RX ORDER — MEPERIDINE HYDROCHLORIDE 50 MG/ML
25 INJECTION INTRAMUSCULAR; INTRAVENOUS; SUBCUTANEOUS
Status: ACTIVE | OUTPATIENT
Start: 2024-10-21 | End: 2024-10-22

## 2024-10-21 RX ORDER — DEXTROSE MONOHYDRATE 100 MG/ML
INJECTION, SOLUTION INTRAVENOUS CONTINUOUS PRN
Status: DISCONTINUED | OUTPATIENT
Start: 2024-10-21 | End: 2024-10-23 | Stop reason: HOSPADM

## 2024-10-21 RX ORDER — PROPOFOL 10 MG/ML
5-50 INJECTION, EMULSION INTRAVENOUS CONTINUOUS
Status: DISCONTINUED | OUTPATIENT
Start: 2024-10-21 | End: 2024-10-23 | Stop reason: HOSPADM

## 2024-10-21 RX ORDER — FUROSEMIDE 10 MG/ML
20 INJECTION INTRAMUSCULAR; INTRAVENOUS EVERY 6 HOURS
Status: DISCONTINUED | OUTPATIENT
Start: 2024-10-22 | End: 2024-10-23

## 2024-10-21 RX ORDER — SODIUM CHLORIDE 9 MG/ML
INJECTION, SOLUTION INTRAVENOUS
Status: DISCONTINUED | OUTPATIENT
Start: 2024-10-21 | End: 2024-10-21 | Stop reason: SDUPTHER

## 2024-10-21 RX ORDER — VANCOMYCIN 1 G/200ML
1000 INJECTION, SOLUTION INTRAVENOUS EVERY 12 HOURS
Status: COMPLETED | OUTPATIENT
Start: 2024-10-21 | End: 2024-10-22

## 2024-10-21 RX ORDER — KETAMINE HYDROCHLORIDE 100 MG/ML
INJECTION, SOLUTION INTRAMUSCULAR; INTRAVENOUS
Status: DISCONTINUED | OUTPATIENT
Start: 2024-10-21 | End: 2024-10-21 | Stop reason: SDUPTHER

## 2024-10-21 RX ORDER — MILRINONE LACTATE 0.2 MG/ML
INJECTION, SOLUTION INTRAVENOUS
Status: DISCONTINUED | OUTPATIENT
Start: 2024-10-21 | End: 2024-10-21 | Stop reason: SDUPTHER

## 2024-10-21 RX ORDER — ETOMIDATE 2 MG/ML
INJECTION, SOLUTION INTRAVENOUS
Status: DISCONTINUED | OUTPATIENT
Start: 2024-10-21 | End: 2024-10-21 | Stop reason: SDUPTHER

## 2024-10-21 RX ORDER — MORPHINE SULFATE 4 MG/ML
4 INJECTION, SOLUTION INTRAMUSCULAR; INTRAVENOUS
Status: DISCONTINUED | OUTPATIENT
Start: 2024-10-21 | End: 2024-10-23 | Stop reason: HOSPADM

## 2024-10-21 RX ORDER — SODIUM CHLORIDE, SODIUM LACTATE, POTASSIUM CHLORIDE, CALCIUM CHLORIDE 600; 310; 30; 20 MG/100ML; MG/100ML; MG/100ML; MG/100ML
INJECTION, SOLUTION INTRAVENOUS CONTINUOUS
Status: DISCONTINUED | OUTPATIENT
Start: 2024-10-21 | End: 2024-10-21 | Stop reason: HOSPADM

## 2024-10-21 RX ORDER — FENTANYL CITRATE 0.05 MG/ML
INJECTION, SOLUTION INTRAMUSCULAR; INTRAVENOUS
Status: DISCONTINUED | OUTPATIENT
Start: 2024-10-21 | End: 2024-10-21 | Stop reason: SDUPTHER

## 2024-10-21 RX ORDER — MUPIROCIN 20 MG/G
OINTMENT TOPICAL 2 TIMES DAILY
Status: DISCONTINUED | OUTPATIENT
Start: 2024-10-21 | End: 2024-10-23 | Stop reason: HOSPADM

## 2024-10-21 RX ORDER — POLYETHYLENE GLYCOL 3350 17 G/17G
17 POWDER, FOR SOLUTION ORAL DAILY
Status: DISCONTINUED | OUTPATIENT
Start: 2024-10-22 | End: 2024-10-23 | Stop reason: HOSPADM

## 2024-10-21 RX ORDER — CLOPIDOGREL BISULFATE 75 MG/1
75 TABLET ORAL DAILY
Status: DISCONTINUED | OUTPATIENT
Start: 2024-10-22 | End: 2024-10-23

## 2024-10-21 RX ORDER — DOBUTAMINE HYDROCHLORIDE 200 MG/100ML
2.5-1 INJECTION INTRAVENOUS CONTINUOUS
Status: DISCONTINUED | OUTPATIENT
Start: 2024-10-21 | End: 2024-10-23 | Stop reason: HOSPADM

## 2024-10-21 RX ORDER — ACETAMINOPHEN 500 MG
1000 TABLET ORAL EVERY 6 HOURS SCHEDULED
Status: DISCONTINUED | OUTPATIENT
Start: 2024-10-22 | End: 2024-10-23 | Stop reason: HOSPADM

## 2024-10-21 RX ORDER — BISACODYL 10 MG
10 SUPPOSITORY, RECTAL RECTAL DAILY PRN
Status: DISCONTINUED | OUTPATIENT
Start: 2024-10-21 | End: 2024-10-23 | Stop reason: HOSPADM

## 2024-10-21 RX ORDER — ASPIRIN 325 MG
325 TABLET, DELAYED RELEASE (ENTERIC COATED) ORAL ONCE
Status: COMPLETED | OUTPATIENT
Start: 2024-10-21 | End: 2024-10-21

## 2024-10-21 RX ORDER — MILRINONE LACTATE 0.2 MG/ML
.0625-.75 INJECTION, SOLUTION INTRAVENOUS CONTINUOUS
Status: DISCONTINUED | OUTPATIENT
Start: 2024-10-21 | End: 2024-10-23 | Stop reason: HOSPADM

## 2024-10-21 RX ORDER — INSULIN LISPRO 100 [IU]/ML
0-6 INJECTION, SOLUTION INTRAVENOUS; SUBCUTANEOUS NIGHTLY
Status: DISCONTINUED | OUTPATIENT
Start: 2024-10-24 | End: 2024-10-23 | Stop reason: HOSPADM

## 2024-10-21 RX ORDER — FAMOTIDINE 20 MG/1
20 TABLET, FILM COATED ORAL 2 TIMES DAILY
Status: DISCONTINUED | OUTPATIENT
Start: 2024-10-21 | End: 2024-10-23 | Stop reason: HOSPADM

## 2024-10-21 RX ORDER — OXYCODONE HYDROCHLORIDE 5 MG/1
10 TABLET ORAL EVERY 4 HOURS PRN
Status: DISCONTINUED | OUTPATIENT
Start: 2024-10-21 | End: 2024-10-23 | Stop reason: HOSPADM

## 2024-10-21 RX ORDER — VANCOMYCIN 1.25 G/250ML
25 INJECTION, SOLUTION INTRAVENOUS ONCE
Status: COMPLETED | OUTPATIENT
Start: 2024-10-21 | End: 2024-10-21

## 2024-10-21 RX ADMIN — HEPARIN SODIUM 30000 UNITS: 1000 INJECTION, SOLUTION INTRAVENOUS; SUBCUTANEOUS at 08:22

## 2024-10-21 RX ADMIN — ROCURONIUM BROMIDE 100 MG: 50 INJECTION, SOLUTION INTRAVENOUS at 07:13

## 2024-10-21 RX ADMIN — POTASSIUM CHLORIDE 20 MEQ: 29.8 INJECTION, SOLUTION INTRAVENOUS at 18:47

## 2024-10-21 RX ADMIN — CEFAZOLIN 2000 MG: 2 INJECTION, POWDER, FOR SOLUTION INTRAVENOUS at 18:06

## 2024-10-21 RX ADMIN — EPOPROSTENOL 50 NG/KG/MIN: 1.5 INJECTION, POWDER, LYOPHILIZED, FOR SOLUTION INTRAVENOUS at 17:12

## 2024-10-21 RX ADMIN — ASPIRIN 325 MG: 325 TABLET, COATED ORAL at 21:35

## 2024-10-21 RX ADMIN — Medication 10 ML: at 20:54

## 2024-10-21 RX ADMIN — MIDAZOLAM 2 MG: 1 INJECTION INTRAMUSCULAR; INTRAVENOUS at 08:54

## 2024-10-21 RX ADMIN — PROPOFOL 30 MCG/KG/MIN: 10 INJECTION, EMULSION INTRAVENOUS at 23:25

## 2024-10-21 RX ADMIN — VASOPRESSIN IN 0.9% SODIUM CHLORIDE 0.04 UNITS/MIN: 20 INJECTION INTRAVENOUS at 11:19

## 2024-10-21 RX ADMIN — Medication 2 AMPULE: at 06:16

## 2024-10-21 RX ADMIN — NOREPINEPHRINE BITARTRATE 16 MCG: 1 INJECTION INTRAVENOUS at 08:20

## 2024-10-21 RX ADMIN — ALBUMIN (HUMAN) 25 G: 12.5 INJECTION, SOLUTION INTRAVENOUS at 16:00

## 2024-10-21 RX ADMIN — ROCURONIUM BROMIDE 50 MG: 50 INJECTION, SOLUTION INTRAVENOUS at 08:54

## 2024-10-21 RX ADMIN — DEXTROSE AND SODIUM CHLORIDE: 5; 450 INJECTION, SOLUTION INTRAVENOUS at 15:36

## 2024-10-21 RX ADMIN — POTASSIUM CHLORIDE 20 MEQ: 29.8 INJECTION, SOLUTION INTRAVENOUS at 21:27

## 2024-10-21 RX ADMIN — FENTANYL CITRATE 100 MCG: 50 INJECTION, SOLUTION INTRAMUSCULAR; INTRAVENOUS at 08:13

## 2024-10-21 RX ADMIN — METOPROLOL TARTRATE 12.5 MG: 25 TABLET, FILM COATED ORAL at 06:15

## 2024-10-21 RX ADMIN — KETAMINE HYDROCHLORIDE 50 MG: 100 INJECTION INTRAMUSCULAR; INTRAVENOUS at 07:13

## 2024-10-21 RX ADMIN — SODIUM BICARBONATE 50 MEQ: 84 INJECTION, SOLUTION INTRAVENOUS at 10:36

## 2024-10-21 RX ADMIN — OXYCODONE 10 MG: 5 TABLET ORAL at 23:34

## 2024-10-21 RX ADMIN — MIDAZOLAM 1 MG: 1 INJECTION INTRAMUSCULAR; INTRAVENOUS at 12:52

## 2024-10-21 RX ADMIN — POTASSIUM CHLORIDE 20 MEQ: 29.8 INJECTION, SOLUTION INTRAVENOUS at 20:08

## 2024-10-21 RX ADMIN — FAMOTIDINE 20 MG: 10 INJECTION, SOLUTION INTRAVENOUS at 20:54

## 2024-10-21 RX ADMIN — PROTAMINE SULFATE 300 MG: 10 INJECTION, SOLUTION INTRAVENOUS at 10:15

## 2024-10-21 RX ADMIN — ACETAMINOPHEN 1000 MG: 500 TABLET ORAL at 06:15

## 2024-10-21 RX ADMIN — SODIUM CHLORIDE 2 MCG/MIN: 9 INJECTION, SOLUTION INTRAVENOUS at 08:22

## 2024-10-21 RX ADMIN — BUPIVACAINE HYDROCHLORIDE 20 ML: 5 INJECTION, SOLUTION EPIDURAL; INTRACAUDAL; PERINEURAL at 07:30

## 2024-10-21 RX ADMIN — NOREPINEPHRINE BITARTRATE 16 MCG: 1 INJECTION INTRAVENOUS at 10:30

## 2024-10-21 RX ADMIN — NOREPINEPHRINE BITARTRATE 16 MCG: 1 INJECTION INTRAVENOUS at 08:24

## 2024-10-21 RX ADMIN — VANCOMYCIN 1000 MG: 1 INJECTION, SOLUTION INTRAVENOUS at 20:49

## 2024-10-21 RX ADMIN — NOREPINEPHRINE BITARTRATE 16 MCG: 1 INJECTION INTRAVENOUS at 10:28

## 2024-10-21 RX ADMIN — SODIUM BICARBONATE 50 MEQ: 84 INJECTION INTRAVENOUS at 12:06

## 2024-10-21 RX ADMIN — SODIUM CHLORIDE: 9 INJECTION, SOLUTION INTRAVENOUS at 07:30

## 2024-10-21 RX ADMIN — CALCIUM CHLORIDE 1000 MG: 100 INJECTION INTRAVENOUS; INTRAVENTRICULAR at 12:40

## 2024-10-21 RX ADMIN — SODIUM CHLORIDE, SODIUM LACTATE, POTASSIUM CHLORIDE, AND CALCIUM CHLORIDE: .6; .31; .03; .02 INJECTION, SOLUTION INTRAVENOUS at 07:07

## 2024-10-21 RX ADMIN — NOREPINEPHRINE BITARTRATE 16 MCG: 1 INJECTION INTRAVENOUS at 10:26

## 2024-10-21 RX ADMIN — NOREPINEPHRINE BITARTRATE 16 MCG: 1 INJECTION INTRAVENOUS at 11:00

## 2024-10-21 RX ADMIN — CHLORHEXIDINE GLUCONATE 15 ML: 1.2 RINSE ORAL at 21:01

## 2024-10-21 RX ADMIN — MIDAZOLAM 3 MG: 1 INJECTION INTRAMUSCULAR; INTRAVENOUS at 07:11

## 2024-10-21 RX ADMIN — VANCOMYCIN 1750 MG: 1.25 INJECTION, SOLUTION INTRAVENOUS at 07:39

## 2024-10-21 RX ADMIN — NOREPINEPHRINE BITARTRATE 16 MCG: 1 INJECTION INTRAVENOUS at 08:26

## 2024-10-21 RX ADMIN — KETAMINE HYDROCHLORIDE 25 MG: 100 INJECTION INTRAMUSCULAR; INTRAVENOUS at 10:56

## 2024-10-21 RX ADMIN — MIDAZOLAM 1 MG: 1 INJECTION INTRAMUSCULAR; INTRAVENOUS at 22:19

## 2024-10-21 RX ADMIN — MORPHINE SULFATE 4 MG: 4 INJECTION, SOLUTION INTRAMUSCULAR; INTRAVENOUS at 14:42

## 2024-10-21 RX ADMIN — SODIUM BICARBONATE 50 MEQ: 84 INJECTION INTRAVENOUS at 15:20

## 2024-10-21 RX ADMIN — CEFAZOLIN 2000 MG: 2 INJECTION, POWDER, FOR SOLUTION INTRAVENOUS at 07:35

## 2024-10-21 RX ADMIN — MORPHINE SULFATE 4 MG: 4 INJECTION, SOLUTION INTRAMUSCULAR; INTRAVENOUS at 22:14

## 2024-10-21 RX ADMIN — MUPIROCIN: 20 OINTMENT TOPICAL at 20:55

## 2024-10-21 RX ADMIN — MILRINONE LACTATE 0.25 MCG/KG/MIN: 0.2 INJECTION, SOLUTION INTRAVENOUS at 19:20

## 2024-10-21 RX ADMIN — ALBUMIN (HUMAN) 25 G: 12.5 INJECTION, SOLUTION INTRAVENOUS at 13:27

## 2024-10-21 RX ADMIN — NOREPINEPHRINE BITARTRATE 16 MCG: 1 INJECTION INTRAVENOUS at 08:22

## 2024-10-21 RX ADMIN — FENTANYL CITRATE 250 MCG: 50 INJECTION, SOLUTION INTRAMUSCULAR; INTRAVENOUS at 07:13

## 2024-10-21 RX ADMIN — KETAMINE HYDROCHLORIDE 25 MG: 100 INJECTION INTRAMUSCULAR; INTRAVENOUS at 10:58

## 2024-10-21 RX ADMIN — DOBUTAMINE HYDROCHLORIDE 2.5 MCG/KG/MIN: 200 INJECTION INTRAVENOUS at 11:55

## 2024-10-21 RX ADMIN — ETOMIDATE 20 MG: 2 INJECTION INTRAVENOUS at 07:13

## 2024-10-21 RX ADMIN — PROPOFOL 20 MCG/KG/MIN: 10 INJECTION, EMULSION INTRAVENOUS at 17:05

## 2024-10-21 RX ADMIN — VASOPRESSIN IN 0.9% SODIUM CHLORIDE 0.04 UNITS/MIN: 20 INJECTION INTRAVENOUS at 19:18

## 2024-10-21 RX ADMIN — PERFLUTREN 1.5 ML: 6.52 INJECTION, SUSPENSION INTRAVENOUS at 15:42

## 2024-10-21 RX ADMIN — SODIUM BICARBONATE 50 MEQ: 84 INJECTION INTRAVENOUS at 13:07

## 2024-10-21 RX ADMIN — MILRINONE LACTATE 0.25 MCG/KG/MIN: 0.2 INJECTION, SOLUTION INTRAVENOUS at 10:50

## 2024-10-21 RX ADMIN — CEFAZOLIN 2000 MG: 2 INJECTION, POWDER, FOR SOLUTION INTRAVENOUS at 22:55

## 2024-10-21 RX ADMIN — DEXMEDETOMIDINE HYDROCHLORIDE 1 MCG/KG/HR: 100 INJECTION, SOLUTION, CONCENTRATE INTRAVENOUS at 10:22

## 2024-10-21 RX ADMIN — MIDAZOLAM 1 MG: 1 INJECTION INTRAMUSCULAR; INTRAVENOUS at 14:41

## 2024-10-21 RX ADMIN — MIDAZOLAM 3 MG: 1 INJECTION INTRAMUSCULAR; INTRAVENOUS at 10:34

## 2024-10-21 RX ADMIN — BUPIVACAINE 20 ML: 13.3 INJECTION, SUSPENSION, LIPOSOMAL INFILTRATION at 07:30

## 2024-10-21 RX ADMIN — CALCIUM CHLORIDE 1000 MG: 100 INJECTION INTRAVENOUS; INTRAVENTRICULAR at 17:03

## 2024-10-21 RX ADMIN — FENTANYL CITRATE 100 MCG: 50 INJECTION, SOLUTION INTRAMUSCULAR; INTRAVENOUS at 10:12

## 2024-10-21 ASSESSMENT — PULMONARY FUNCTION TESTS
PIF_VALUE: 18
PIF_VALUE: 19
PIF_VALUE: 18
PIF_VALUE: 17
PIF_VALUE: 18
PIF_VALUE: 19
PIF_VALUE: 20
PIF_VALUE: 21
PIF_VALUE: 18
PIF_VALUE: 18
PIF_VALUE: 20
PIF_VALUE: 18
PIF_VALUE: 18
PIF_VALUE: 22
PIF_VALUE: 25
PIF_VALUE: 18
PIF_VALUE: 19
PIF_VALUE: 19
PIF_VALUE: 18
PIF_VALUE: 25
PIF_VALUE: 21

## 2024-10-21 ASSESSMENT — PAIN SCALES - GENERAL
PAINLEVEL_OUTOF10: 0
PAINLEVEL_OUTOF10: 9
PAINLEVEL_OUTOF10: 0
PAINLEVEL_OUTOF10: 0

## 2024-10-21 ASSESSMENT — PAIN DESCRIPTION - DESCRIPTORS: DESCRIPTORS: OTHER (COMMENT)

## 2024-10-21 ASSESSMENT — PAIN DESCRIPTION - ORIENTATION
ORIENTATION: OTHER (COMMENT)
ORIENTATION: ANTERIOR

## 2024-10-21 ASSESSMENT — PAIN DESCRIPTION - LOCATION
LOCATION: CHEST
LOCATION: MEDIASTINUM

## 2024-10-21 ASSESSMENT — PAIN SCALES - WONG BAKER: WONGBAKER_NUMERICALRESPONSE: HURTS A LITTLE BIT

## 2024-10-21 NOTE — OP NOTE
Operative Note      Patient: Tomer Amaya  YOB: 1975  MRN: [id number]    Date of Procedure: 10/21/2024    Pre-Op Diagnosis Codes:      * Mitral regurgitation [I34.0]    Post-Op Diagnosis: Same       Procedure(s):  OPEN MITRAL VALVE REPAIR; LEFT ATRIAL APPENDAGE CLIP PLACEMENT; LONGITIDUNAL STERNAL STABILIZATION; TRANSESOPHAGEAL ECHOCARDIOGRAM; BILATERAL PECTORALIS BLOCKS; TOTAL CARDIOPULMONARY BYPASS    Surgeon(s):  Igor Landon MD Seto, Lynn S, MD Dr Seto was assistant for valve exposure and repair    Assistant:   Surgical Assistant: Zeinab Paredes  Physician Assistant: Sabrina Nino PA-C; Fernando Sadler PA-C    Anesthesia: General    Estimated Blood Loss (mL): 300     Complications: None    Specimens:   ID Type Source Tests Collected by Time Destination   A : XIPHOID Bone Bone SURGICAL PATHOLOGY Igor Landon MD 10/21/2024 1103        Implants:  Implant Name Type Inv. Item Serial No.  Lot No. LRB No. Used Action   APPLIER CLIP MED SUTURE LESS 45 MM SYS 1 HND STRL ATRICLIP FLX V - [id number] Cardiovascular occluders APPLIER CLIP MED SUTURE LESS 45 MM SYS 1 HND STRL ATRICLIP FLX V  [employer] INC-WD [id number] N/A 1 Implanted   sanchez physio annuloplasty ring   [id number]   N/A 1 Implanted   SCREW BNE L13MM DIA2.3MM THOR STRNL TI JULIETTE DRL FREE LEV 1 - [id number]  SCREW BNE L13MM DIA2.3MM THOR STRNL TI JULIETTE DRL FREE LEV 1  KLS IRIS LP-WD  N/A 10 Implanted   SCREW BNE L15MM DIA2.3MM THOR STRNL TI JULIETTE DRL FREE LEV 1 - [id number]  SCREW BNE L15MM DIA2.3MM THOR STRNL TI JULIETTE DRL FREE LEV 1  KLS IRIS LP-WD  N/A 10 Implanted   PLATE BNE L208MM THK2.1/2.5MM 14 H THOR STRNL PEEK JULIETTE LSS - [id number]  PLATE BNE L208MM THK2.1/2.5MM 14 H THOR STRNL PEEK JULIETTE LSS  KLS IRIS LP-WD [id number] N/A 1 Implanted         Drains:   Chest Tube Left Pleural 1 (Active)       Chest Tube Anterior Mediastinal 2 (Active)       Chest Tube Right Pleural 3 (Active)       Urinary Catheter  then applied. Was then taken to the ICU in satisfactory critical condition.  I was present and scrubbed for the duration of the case and all sponge and needle counts were correct.      Electronically signed by Igor Landon MD on 10/21/2024 at 11:21 AM

## 2024-10-21 NOTE — CONSULTS
Consults    Pt seen and examined at bedside.   Decision to place ANTONIO in consult. Obturator removed and sterilely paced. Chloroprep to site. Glove and mask on. Sterile towels to site x 4. Sterile prep x2 to site with swab to area.  Antonio placed sterile x1 at 45-50 cm.     ROBSON Adair MD

## 2024-10-21 NOTE — ANESTHESIA PROCEDURE NOTES
Peripheral Block    Patient location during procedure: OR  Reason for block: post-op pain management and at surgeon's request  Start time: 10/21/2024 7:30 AM  End time: 10/21/2024 7:34 AM  Staffing  Performed: anesthesiologist   Anesthesiologist: Selvin Mccarthy MD  Performed by: Selvin Mccarthy MD  Authorized by: Selvin Mccarthy MD    Preanesthetic Checklist  Completed: patient identified, IV checked, site marked, risks and benefits discussed, surgical/procedural consents, equipment checked, pre-op evaluation, timeout performed, anesthesia consent given, oxygen available and monitors applied/VS acknowledged  Peripheral Block   Patient position: sitting  Prep: ChloraPrep  Provider prep: mask and sterile gloves  Patient monitoring: cardiac monitor, continuous pulse ox, frequent blood pressure checks and IV access  Block type: PECS II  Laterality: bilateral  Injection technique: single-shot  Guidance: ultrasound guided  Local infiltration: lidocaine  Infiltration strength: 1 %  Local infiltration: lidocaine  Dose: 3 mL    Needle   Needle type: insulated echogenic nerve stimulator needle   Needle gauge: 21 G  Needle localization: ultrasound guidance  Needle length: 10 cm  Assessment   Injection assessment: negative aspiration for heme, no paresthesia on injection and local visualized surrounding nerve on ultrasound  Paresthesia pain: none  Slow fractionated injection: yes  Hemodynamics: stable  Outcomes: patient tolerated procedure well    Medications Administered  BUPivacaine (MARCAINE) PF injection 0.5% - Perineural   20 mL - 10/21/2024 7:30:00 AM  BUPivacaine liposome (EXPAREL) injection 1.3% - Perineural   20 mL - 10/21/2024 7:30:00 AM

## 2024-10-21 NOTE — PROGRESS NOTES
Patient admitted to CVU from CVOR with RN and anesthesia attached to monitors and ventilator.  Report received from anesthesiologist, Dr. Mccarthy.  Portable chest x-ray taken and reviewed and line and tube placement verified by Dr. Salcedo.  Labs drawn, sent to lab and test completed and reviewed on [facility].  Assessment complete.  Continue monitoring hemodynamics per protocol.       RECOVERY PERIOD BEGINS  1200        Received a message from [employer] stating the EFFEXOR XR 37.5 MG 24 hr capsules have been approved from 4/22/2019 from 4/21/2020.

## 2024-10-21 NOTE — PROGRESS NOTES
10/21/24 1156   Vent Information   Ventilator Initiate Yes   Vent Mode AC/VC   Ventilator Settings   Vt (Set, mL) 500 mL   Resp Rate (Set) 16 bpm   PEEP/CPAP (cmH2O) 5   FiO2  100 %   Vent Patient Data (Readings)   Vt (Measured) 748 mL   Peak Inspiratory Pressure (cmH2O) 18 cmH2O   Rate Measured 16 br/min   Minute Volume (L/min) 9.23 Liters   Mean Airway Pressure (cmH2O) 9.8 cmH20   Plateau Pressure (cm H2O) 0 cm H2O   Driving Pressure -5   I:E Ratio 1:1.90   Backup Apnea On   Backup Rate 16 Breaths Per Minute   Backup Vt 500   Vent Alarm Settings   High Pressure (cmH2O) 45 cmH2O   Low Minute Volume (lpm) 2 L/min   Low Exhaled Vt (ml) 200 mL   RR High (bpm) 40 br/min   Apnea (secs) 20 secs   ETT    Placement Date/Time: 10/21/24 0715   Present on Admission/Arrival: No  Placed By: In surgery  Placement Verified By: Auscultation;Capnometry  Preoxygenation: Yes  Mask Ventilation: Ventilated by mask (1)  Technique: Direct laryngoscopy  Airway Type: C...   Secured At 22 cm   Measured From Lips   ETT Placement Right   Secured By Commercial tube king   Cuff Pressure 30 cm H2O   Tie/King Changed Yes

## 2024-10-21 NOTE — ANESTHESIA PROCEDURE NOTES
Central Venous Line:    A central venous line was placed using surface landmarks, in the OR for the following indication(s): central venous access and CVP monitoring.10/21/2024 7:22 AM10/21/2024 7:30 AM    Sterility preparation included the following: provider used sterile gloves, gown, hat and mask and maximum sterile barriers used during central venous catheter insertion.    The patient was placed in Trendelenburg position.The right internal jugular vein was prepped.    The site was prepped with Chloraprep.  A 9 Fr (size), 10 (length), introducer triple lumen was placed.    During the procedure, the following specific steps were taken: target vein identified, needle advanced into vein and blood aspirated and guidewire advanced into vein.    Intravenous verification was obtained by venous blood return.    Post insertion care included: all ports aspirated, all ports flushed easily, guidewire removed intact, Biopatch applied, line sutured in place and dressing applied.    During the procedure the patient experienced: patient tolerated procedure well with no complications and EBL < 5mL.      Outcomes: uncomplicated and patient tolerated procedure well  Anesthesia type: general..No  Staffing  Performed: Anesthesiologist   Anesthesiologist: Selvin Mccarthy MD  Performed by: Selvin Mccarthy MD  Authorized by: Selvin Mccarthy MD    Preanesthetic Checklist  Completed: patient identified, timeout performed and monitors applied/VS acknowledged

## 2024-10-21 NOTE — ANESTHESIA PROCEDURE NOTES
Procedure Performed: HENRIETTA       Start Time:  10/21/2024 7:50 AM       End Time:   10/21/2024 8:07 AM    Anesthesia Information  Performed Personally        Preanesthesia Checklist:  Patient identified, IV assessed, risks and benefits discussed, monitors and equipment assessed, procedure being performed at surgeon's request and anesthesia consent obtained.    General Procedure Information  Diagnostic Indications for Echo:  hemodynamic monitoring  Physician Requesting Echo: Igro Landon MD  Location performed:  OR  Intubated  Bite block not placed  Heart visualized  Probe Insertion:  Easy  Probe Type:  3D  Modalities:  2D only    Echocardiographic and Doppler Measurements    Ventricles    Ventricle  Cavity Size  Cavity          Dimension  Hypertrophy  Thrombus  Global FXN  EF    RV  normal    No  No  normal      LV  normal    No  No  normal             Valves     Valves  Annulus  Stenosis Measurements   Regurg  Leaflet   Morph  Leaflet   Motion Valve Comments    Aortic normal Stenosis none.            none   normal normal       Mitral normal none             severe thickened normal    Tricuspid normal   not present         none    normal      Pulmonic normal   not present         none              Aorta     Aorta  Size  Diam(cm)  Dissection Carmen Classification    Ascending normal         Arch normal        Descending normal                  Atria     Size  SEC (smoke)  Thrombus  Tumor  Device    Rt Atrium normal        Lt Atrium normal         Left Atrial Appendage: normal        Septa    Interatrial Septal Morphology: normal          Interventricular Septal Morphology: normal              Other Findings  Pericardium:  normal  Pleural Effusion:  none  Pulmonary Arteries:  normal    Post Intervention Follow-up Study  Ventricular Global Function: unchanged.   Ventricular Regional Function: decreased     Valve  Function  Regurgitation  Area Prosthetic?    Aortic   mild      Mitral  improved  none      Tricuspid

## 2024-10-21 NOTE — ANESTHESIA PRE PROCEDURE
Department of Anesthesiology  Preprocedure Note       Name:  Tomer Amaya   Age:  49 y.o.  :  1975                                          MRN:  [id number]         Date:  10/21/2024      Surgeon: Surgeon(s):  Igor Landon MD Seto, Lynn S, MD    Procedure: Procedure(s):  OPEN MITRAL VALVE REPAIR, POSSIBLE MITRAL VALVE REPLACEMENT, LEFT ATRIAL APPENDAGE CLIP PLACEMENT, LONGITIDUNAL STERNAL STABILIZATION    Medications prior to admission:   Prior to Admission medications    Medication Sig Start Date End Date Taking? Authorizing Provider   dilTIAZem (CARDIZEM CD) 120 MG extended release capsule Take 1 capsule by mouth daily  Patient taking differently: Take 1 capsule by mouth nightly 24  Yes Antonio Jackson DO   chlorhexidine gluconate (HIBICLENS) 4 % SOLN external solution Please Lather entire body and rinse the night before surgery and the morning of surgery. 24  Yes Igor Landon MD   lisinopril (PRINIVIL;ZESTRIL) 5 MG tablet Take 1 tablet by mouth daily 24   Antonio Jackosn DO       Current medications:    Current Facility-Administered Medications   Medication Dose Route Frequency Provider Last Rate Last Admin   • lidocaine PF 1 % injection 0.3 mL  0.3 mL IntraDERmal Once PRN Giovany Helm MD       • lactated ringers IV soln infusion SOLN   IntraVENous Continuous Giovany Helm MD       • sodium chloride flush 0.9 % injection 5-40 mL  5-40 mL IntraVENous 2 times per day Giovany Helm MD       • sodium chloride flush 0.9 % injection 5-40 mL  5-40 mL IntraVENous PRN Giovany Helm MD       • 0.9 % sodium chloride infusion   IntraVENous PRN Giovany Helm MD       • sodium chloride flush 0.9 % injection 5-40 mL  5-40 mL IntraVENous 2 times per day Igor Landon MD       • sodium chloride flush 0.9 % injection 5-40 mL  5-40 mL IntraVENous PRN Igor Landon MD       • 0.9 % sodium chloride infusion   IntraVENous PRN Igor Landon MD       •

## 2024-10-21 NOTE — H&P
[facility]   New Patient Clinic Note      5/2/2024 4:00 PM  Surgeon:  Barbi Bear for :  mitral regurgitation     Chief complaint : no symptoms  Subjective:  Mr. Amaya presented to the ER with palpitations and was found to have a murmur. During workup he was found to have severe MR with bileaflet prolapse.      Review of Systems     Review of Systems:  Constitutional:  No night sweats, headaches, weight loss.  Eyes:  No glaucoma, cataracts.   ENMT:  No nosebleeds, deviated septum.   Cardiac:  No arrhythmias. + palpitations.   Vascular:  No claudication, varicosities.  GI:  No PUD, heartburn.  :  No kidney stones, frequent UTIs  Musculoskeletal:  No arthritis, gout.  Respiratory:  No SOB, emphysema, asthma.  Integumentary:  No dermatitis, itching, rash.  Neurological:  No stroke, TIAs, seizures.  Psychiatric:  No depression, + anxiety.  Endocrine: No diabetes, thyroid issues.  Hematologic:  No bleeding, easy bruising.  Immunologic:  No known cancer, steroid therapies.        Past Medical History:    Past Medical History            Diagnosis Date    Hypertension      Mitral valve disease              Past Surgical History:    Past Surgical History   No past surgical history on file.        Allergies:  Patient has no known allergies.     Social History:    TOBACCO:   reports that he has never smoked. He has never been exposed to tobacco smoke. He does not have any smokeless tobacco history on file.  ETOH:   reports that he does not currently use alcohol.  DRUGS:   reports no history of drug use.        Family History:    Family History             Problem Relation Age of Onset    Stroke Mother      High Blood Pressure Father      Heart Disease Father      Diabetes Father                 Vital Signs: BP (!) 148/82 (Site: Left Upper Arm, Position: Sitting)   Pulse (!) 104 Comment: anxious  Temp 99.2 °F (37.3 °C) (Infrared)   Ht 1.702 m (5' 7.01\")   Wt 72.5 kg (159 lb 12.8 oz)    ([employer] 8.1 #2, S9) 2 CH/*TFI2D1_9     ANALYSIS INFORMATION     Patient weight        70.3                                          kg  Method                Long axis 2 chamber view/Area Length/Contours  BSA                   1.88                                          m?  Heart rate            95                                            bpm  Analysis performed in [employer] 8.1.148.2  Patient height        180                                           cm  BSA method            [facility]  Normal range          [facility] 2006 SSFP (M/50-59y/1.5T)        LV VOLUMETRY     ED mass     0.00   g          (106-186)  EDV         206.16 ml         (112-196)  ESV         73.61  ml         (29-73)  SV          132.55 ml         ()  EF          64.30  %          (58-76)  CO          12.62  l/min  ED Mass/BSA 0.00   g/m?       (57-91)  EDV/BSA     109.85 ml/m?      (61-97)  ESV/BSA     39.22  ml/m?      (15-37)  SV/BSA      70.63  ml/m?      (40-66)  CO/BSA      6.73   l/(min*m?)  -----------------------------------------------------------------     PC FLOW ([employer] 8.1 #3, S70) AO FLOW_IPAT_@C_P/*FL2D1_V180IN     PC FLOW RESULTS PUNEET 3:[PUNEET 3] SLICE 1     Net flow volume            71.63  ml/beat 6.79 l/min  Forward flow volume (S.I)  71.94  ml/beat 6.82 l/min  Backward flow volume (S.I) 0.00   ml/beat 0.00 l/min  Regurgitant fraction (S.I) 0.00   %  Peak flow velocity         159.35 cm/s  Peak pressure gradient     10.16  mmHg  -----------------------------------------------------------------     STRAIN - APICAL (QSTRAIN) AVERAGE LAX A2C/A3C/A4C STRAIN     STRAIN - APICAL     EndoGCS     -36.9 %  EndoGLS     -25.6 %  EF          70.2  %  EDV         184.1 ml  ESV         55.6  ml  ESL         6.8   cm  ESDbas      5.2   cm  EDL         9.2   cm  EDDbas      4.0   cm  SD-LS-Syst. 9.9   %  -----------------------------------------------------------------     CONCLUSIONS     There is LV dilation with preserved systolic

## 2024-10-21 NOTE — CONSENT
Informed Consent for Blood Component Transfusion Note    I have discussed with the patient the rationale for blood component transfusion; its benefits in treating or preventing fatigue, organ damage, or death; and its risk which includes mild transfusion reactions, rare risk of blood borne infection, or more serious but rare reactions. I have discussed the alternatives to transfusion, including the risk and consequences of not receiving transfusion. The patient had an opportunity to ask questions and had agreed to proceed with transfusion of blood components.    Electronically signed by KAREN Solorio CNP on 10/21/24 at 1:23 PM EDT

## 2024-10-21 NOTE — PROGRESS NOTES
ATTEMPTED TO CALL THE FAMILY. I WAS TOLD BY THE FAMILY PRE OP TO ONLY CALL MARCIA. THE NUMBER IN THE COMPUTER MATCHES THE NUMBER HE GAVE ME TO CALL. THE PHONE NEVER RINGS, NO MATTER WHAT PHONE I CALL FROM.  I ASKED THE ICU CHARGE NURSE TO SEE IF THE FAMILY WAS IN THE WAITING ROOM WHERE WE ESCORTED THEM TO WAIT. THERE IS NO ONE IN THE ICU WAITING ROOM.

## 2024-10-21 NOTE — ANESTHESIA POSTPROCEDURE EVALUATION
Department of Anesthesiology  Postprocedure Note    Patient: Tomer Amaya  MRN: [id number]  YOB: 1975  Date of evaluation: 10/21/2024    Procedure Summary       Date: 10/21/24 Room / Location: [address]    Anesthesia Start: 0707 Anesthesia Stop: 1209    Procedure: OPEN MITRAL VALVE REPAIR; LEFT ATRIAL APPENDAGE CLIP PLACEMENT; LONGITIDUNAL STERNAL STABILIZATION; TRANSESOPHAGEAL ECHOCARDIOGRAM; BILATERAL PECTORALIS BLOCKS; TOTAL CARDIOPULMONARY BYPASS (Chest) Diagnosis:       Mitral regurgitation      (Mitral regurgitation [I34.0])    Surgeons: Igor Landon MD Responsible Provider: Selvin Mccarthy MD    Anesthesia Type: general ASA Status: 4            Anesthesia Type: No value filed.    Dacia Phase I: Dacia Score: 10    Dacia Phase II:      Anesthesia Post Evaluation    Patient location during evaluation: ICU  Patient participation: complete - patient cannot participate  Level of consciousness: sedated and ventilated  Pain score: 0  Airway patency: patent  Nausea & Vomiting: no nausea and no vomiting  Cardiovascular status: vasoactive/inotropes  Respiratory status: ventilator  Hydration status: stable    No notable events documented.

## 2024-10-21 NOTE — PROGRESS NOTES
10/21/24 1552   Patient Observation   Pulse 85   Respirations 17   SpO2 100 %   Vent Information   Vent Mode AC/VC   Ventilator Settings   Vt (Set, mL) 500 mL   Resp Rate (Set) 16 bpm   PEEP/CPAP (cmH2O) 5   FiO2  40 %   Vent Patient Data (Readings)   Vt (Measured) 543 mL   Peak Inspiratory Pressure (cmH2O) 20 cmH2O   Rate Measured 17 br/min   Minute Volume (L/min) 9.13 Liters   Mean Airway Pressure (cmH2O) 9.7 cmH20   Plateau Pressure (cm H2O) 0 cm H2O   Driving Pressure -5   I:E Ratio 1:2.10   Backup Apnea On   Vent Alarm Settings   High Pressure (cmH2O) 45 cmH2O   Low Minute Volume (lpm) 2 L/min   Low Exhaled Vt (ml) 200 mL   RR High (bpm) 40 br/min   Apnea (secs) 20 secs   Additional Respiratoray Assessments   Humidification Source HME   Ambu Bag With Mask At Bedside Yes   ETT    Placement Date/Time: 10/21/24 0715   Present on Admission/Arrival: No  Placed By: In surgery  Placement Verified By: Auscultation;Capnometry  Preoxygenation: Yes  Mask Ventilation: Ventilated by mask (1)  Technique: Direct laryngoscopy  Airway Type: C...   Secured At 22 cm   Measured From Lips   ETT Placement Right  (found in center)   Secured By Commercial tube huitron   Site Assessment Dry   Cuff Pressure 30 cm H2O

## 2024-10-21 NOTE — ADDENDUM NOTE
Addendum  created 10/21/24 170 by Aleksandar Adair MD    Child order released for a procedure order, Order Canceled from Note

## 2024-10-21 NOTE — PROGRESS NOTES
Shift: [id number]    Procedure: OPEN MITRAL VALVE REPAIR; LEFT ATRIAL APPENDAGE CLIP PLACEMENT; LONGITIDUNAL STERNAL STABILIZATION; TRANSESOPHAGEAL ECHOCARDIOGRAM; BILATERAL PECTORALIS BLOCKS; TOTAL CARDIOPULMONARY BYPASS     Admit from OR (time and date): 10/21/24 @ 12:00    Transition (time and date): n/a    Surgery, return to OR no     Nursing assessment at handoff  stable    Most recent vitals: /77   Pulse 100   Temp 98.6 °F (37 °C) (Bladder)   Resp 16   Ht 1.702 m (5' 7\")   Wt 72 kg (158 lb 12.8 oz)   SpO2 100%   BMI 24.87 kg/m²      Increased O2 requirements: no O2 requirements: Oxygen Therapy  SpO2: 100 %  Pulse Oximeter Device Mode: Continuous  O2 Device: Ventilator  FiO2 : 40 %  Vent Mode: AC/VC     Admission weight Weight - Scale: 72.6 kg (160 lb)  Today's weight   Wt Readings from Last 1 Encounters:   10/21/24 72 kg (158 lb 12.8 oz)         EF: 43%    Drop in Urinary Output no     Rhythm Changes SR, accelerated junctional    Pacing Wires Removed:  [] Yes  [x] NO  [] Platelets < 50,000  [] Arrhythmia  [] Bradycardia [] Valve Replacement  [] Pacing for Cardiac Index  []Physician Order    Lines/Drains  LDA Insertion Date Discontinued Date Dressing Changes   Art line 10/21     Central Line  SWAN 10/21     Rosa 10/21     Chest Tube 10/21     Wires  10/21     ETT 10/21     MYRTLE Drain      VasCath      Impella        Interventions After Office Hours  Problem(Brief) Date Time Intervention Physician contacted                                               Drip rates at handoff:    VASOpressin 0.04 Units/min (10/21/24 1739)    dextrose 5 % and 0.45 % NaCl 100 mL/hr at 10/21/24 1739    sodium chloride      norepinephrine 5 mcg/min (10/21/24 1739)    niCARdipine      insulin 2.1 Units/hr (10/21/24 1823)    dextrose      DOBUTamine 1 mcg/kg/min (10/21/24 1739)    sodium chloride      dexmedeTOMIDine      amiodarone      Followed by    amiodarone      EPINEPHrine      epoprostenol 1,500 mcg in sodium chloride  0.9 % 50 mL nebulization solution 50 ng/kg/min (10/21/24 1712)    propofol 30 mcg/kg/min (10/21/24 1739)       Hospital Course:  DOS 10/21 DAYS  -Out of OR at 12:00  -50g albumin   -2g calcium replaced  -K replaced  -2 units Cryo given  -Junctional rhythm, heart tones distant/muffled, STAT ECHO obtained  -Velitri started, runs of V-tach, velitri stopped  -Mediastinal CT output 35mL   -Pleural CT output 355 mL  -       Lab Data:  CBC:   Recent Labs     10/21/24  1200 10/21/24  1203 10/21/24  1627 10/21/24  1822   WBC 22.2*  --   --   --    HGB 10.9*   < > 11.0* 10.4*   HCT 33.3*  --   --   --    MCV 89.1  --   --   --      --   --   --     < > = values in this interval not displayed.     BMP:    Recent Labs     10/21/24  1200      K 4.3   CO2 21   BUN 9   CREATININE 0.7*     LIVR: No results for input(s): \"AST\", \"ALT\" in the last 72 hours.  PT/INR:   Recent Labs     10/21/24  1200   INR 1.52*     APTT:   Recent Labs     10/21/24  1200   APTT 37.2*     ABG:   Recent Labs     10/21/24  1627 10/21/24  1822   PHART 7.443 7.441   OFO0SEB 34.0* 35.8   PO2ART 188.9* 181.6*

## 2024-10-21 NOTE — PROGRESS NOTES
10/21/24 1942   Patient Observation   Pulse 96   Respirations 16   SpO2 100 %   Breath Sounds   Right Upper Lobe Diminished   Right Middle Lobe Diminished   Right Lower Lobe Diminished   Left Upper Lobe Diminished   Left Lower Lobe Diminished   Vent Information   Vent Mode AC/VC   Ventilator Settings   Vt (Set, mL) 500 mL   Resp Rate (Set) 16 bpm   PEEP/CPAP (cmH2O) 5   FiO2  40 %   Vent Patient Data (Readings)   Vt (Measured) 515 mL   Peak Inspiratory Pressure (cmH2O) 17 cmH2O   Rate Measured 16 br/min   Minute Volume (L/min) 8.25 Liters   Mean Airway Pressure (cmH2O) 9.3 cmH20   Plateau Pressure (cm H2O) 0 cm H2O   Driving Pressure -5   I:E Ratio 1:2.40   Backup Apnea On   Vent Alarm Settings   High Pressure (cmH2O) 45 cmH2O   Low Minute Volume (lpm) 2 L/min   Low Exhaled Vt (ml) 200 mL   RR High (bpm) 40 br/min   Apnea (secs) 20 secs   Additional Respiratoray Assessments   Humidification Source Heated wire   Humidification Temp 35   Circuit Condensation Drained   Ambu Bag With Mask At Bedside Yes   Airway Clearance   Suction ET Tube   Suction Device Inline suction catheter   Sputum Method Obtained Endotracheal   Sputum Amount Scant   Sputum Color/Odor Clear   Sputum Consistency Thin   ETT    Placement Date/Time: 10/21/24 0715   Present on Admission/Arrival: No  Placed By: In surgery  Placement Verified By: Auscultation;Capnometry  Preoxygenation: Yes  Mask Ventilation: Ventilated by mask (1)  Technique: Direct laryngoscopy  Airway Type: C...   Secured At 23 cm   Measured From Lips   ETT Placement Center   Secured By Commercial tube huitron   Site Assessment Dry   Cuff Pressure 30 cm H2O

## 2024-10-21 NOTE — CONSULTS
[facility]   CONSULTATION  [phone]      Attending Physician: Igor Landon MD  Reason for Consultation/Chief Complaint:  postop abnl echo    Subjective   History of Present Illness:  Tomer Amaya is a 49 y.o. patient who presented to the hospital for elective mitral valve surgery, he underwent mitral valve repair and postoperatively developed hypotension, he was supported with vasopressors and inotropes, echocardiogram was done which showed low normal to mildly reduced LV function with intact mitral valve repair however RV dilation and dysfunction was noted.  EKG was obtained with nonspecific ST changes.  Patient overall appears to be stabilizing after initial episode of hypotension.  He is intubated/sedated, history is not obtainable from him, history is per discussion with CT surgery team as well as ICU team.    Patient has had a history of mitral valve prolapse, he has been seen with Dr. Jackson in our office, cardia catheterization did not show significant coronary disease and he was referred for mitral valve surgery as noted above.    Past Medical History:   has a past medical history of Anxiety, Hypertension, and Mitral valve disease.    Surgical History:   has a past surgical history that includes Cardiac procedure (N/A, 05/15/2024); invasive vascular (N/A, 05/15/2024); [place] tooth extraction; and Mitral valve repair (N/A, 10/21/2024).     Social History:   reports that he has never smoked. He has never been exposed to tobacco smoke. He has never used smokeless tobacco. He reports that he does not currently use alcohol. He reports that he does not use drugs.     Family History:  family history includes Crohn's Disease in his father; Diabetes in his father; Heart Disease in his father; High Blood Pressure in his father; Stroke in his mother; Valvular Heart Disease in his father.      Home Medications:  Were reviewed and are listed in nursing record and/or below  Prior to  BID  [START ON 10/22/2024] potassium chloride (KLOR-CON M) extended release tablet 10 mEq, TID WC  sodium bicarbonate 8.4 % injection 50 mEq, Q30 Min PRN  midazolam (VERSED) injection 1 mg, Q1H PRN  [START ON 10/22/2024] polyethylene glycol (GLYCOLAX) packet 17 g, Daily  [START ON 10/22/2024] bisacodyl (DULCOLAX) EC tablet 5 mg, Daily  [START ON 10/24/2024] magnesium hydroxide (MILK OF MAGNESIA) 400 MG/5ML suspension 30 mL, Daily PRN  bisacodyl (DULCOLAX) suppository 10 mg, Daily PRN  [START ON 10/22/2024] metoprolol tartrate (LOPRESSOR) tablet 12.5 mg, BID  [START ON 10/22/2024] atorvastatin (LIPITOR) tablet 40 mg, Nightly  famotidine (PEPCID) tablet 20 mg, BID   Or  famotidine (PEPCID) 20 mg in sodium chloride (PF) 0.9 % 10 mL injection, BID  ceFAZolin (ANCEF) 2,000 mg in sodium chloride 0.9 % 50 mL IVPB (mini-bag), Q8H  vancomycin (VANCOCIN) 1000 mg in 200 mL IVPB, Q12H  potassium chloride 20 mEq/50 mL IVPB (Central Line), PRN  magnesium sulfate 2000 mg in 50 mL IVPB premix, PRN  calcium chloride 1,000 mg in sodium chloride 0.9 % 100 mL IVPB, PRN   Or  calcium chloride 2,000 mg in sodium chloride 0.9 % 100 mL IVPB, PRN  albuterol (PROVENTIL) (2.5 MG/3ML) 0.083% nebulizer solution 2.5 mg, Q4H WA RT  albumin human 5% IV solution 25 g, PRN  norepinephrine (LEVOPHED) 16 mg in sodium chloride 0.9 % 250 mL infusion, Continuous PRN  niCARdipine (CARDENE) 50 mg in sodium chloride 0.9 % 100 mL infusion, Continuous PRN  [START ON 10/23/2024] insulin glargine (LANTUS) injection vial 11 Units, Nightly  insulin regular (HUMULIN R;NOVOLIN R) 100 Units in sodium chloride 0.9 % 100 mL infusion, Continuous  [START ON 10/24/2024] insulin lispro (HUMALOG,ADMELOG) injection vial 0-12 Units, TID WC  [START ON 10/24/2024] insulin lispro (HUMALOG,ADMELOG) injection vial 0-6 Units, Nightly  glucose chewable tablet 16 g, PRN  dextrose bolus 10% 125 mL, PRN   Or  dextrose bolus 10% 250 mL, PRN  glucagon injection 1 mg, PRN  dextrose 10 %

## 2024-10-22 ENCOUNTER — APPOINTMENT (OUTPATIENT)
Dept: GENERAL RADIOLOGY | Age: 49
DRG: 219 | End: 2024-10-22
Attending: STUDENT IN AN ORGANIZED HEALTH CARE EDUCATION/TRAINING PROGRAM
Payer: COMMERCIAL

## 2024-10-22 ENCOUNTER — APPOINTMENT (OUTPATIENT)
Age: 49
DRG: 219 | End: 2024-10-22
Attending: INTERNAL MEDICINE
Payer: COMMERCIAL

## 2024-10-22 PROBLEM — R00.1 JUNCTIONAL BRADYCARDIA: Status: ACTIVE | Noted: 2024-10-22

## 2024-10-22 PROBLEM — R57.0 CARDIOGENIC SHOCK: Status: ACTIVE | Noted: 2024-10-22

## 2024-10-22 LAB
ACTIVATED CLOTTING TIME: 431 SEC (ref 99–130)
ACTIVATED CLOTTING TIME: 458 SEC (ref 99–130)
ACTIVATED CLOTTING TIME: 486 SEC (ref 99–130)
ACTIVATED CLOTTING TIME: 510 SEC (ref 99–130)
ACTIVATED CLOTTING TIME: 88 SEC (ref 99–130)
ACTIVATED CLOTTING TIME: 95 SEC (ref 99–130)
ANION GAP SERPL CALCULATED.3IONS-SCNC: 9 MMOL/L (ref 3–16)
BASE EXCESS BLDA CALC-SCNC: -2 MMOL/L (ref -3–3)
BASE EXCESS BLDA CALC-SCNC: -3 MMOL/L (ref -3–3)
BASE EXCESS BLDA CALC-SCNC: -3 MMOL/L (ref -3–3)
BASE EXCESS BLDA CALC-SCNC: -4 MMOL/L (ref -3–3)
BASE EXCESS BLDA CALC-SCNC: -5 MMOL/L (ref -3–3)
BASE EXCESS BLDA CALC-SCNC: -8 MMOL/L (ref -3–3)
BASE EXCESS BLDA CALC-SCNC: 0 MMOL/L (ref -3–3)
BUN SERPL-MCNC: 12 MG/DL (ref 7–20)
CA-I BLD-SCNC: 1.07 MMOL/L (ref 1.12–1.32)
CA-I BLD-SCNC: 1.12 MMOL/L (ref 1.12–1.32)
CA-I BLD-SCNC: 1.14 MMOL/L (ref 1.12–1.32)
CA-I BLD-SCNC: 1.15 MMOL/L (ref 1.12–1.32)
CA-I BLD-SCNC: 1.19 MMOL/L (ref 1.12–1.32)
CA-I BLD-SCNC: 1.21 MMOL/L (ref 1.12–1.32)
CA-I BLD-SCNC: 1.22 MMOL/L (ref 1.12–1.32)
CA-I BLD-SCNC: 1.23 MMOL/L (ref 1.12–1.32)
CA-I BLD-SCNC: 1.24 MMOL/L (ref 1.12–1.32)
CA-I BLD-SCNC: 1.25 MMOL/L (ref 1.12–1.32)
CA-I BLD-SCNC: 1.38 MMOL/L (ref 1.12–1.32)
CALCIUM SERPL-MCNC: 8.2 MG/DL (ref 8.3–10.6)
CHLORIDE SERPL-SCNC: 112 MMOL/L (ref 99–110)
CO2 BLDA-SCNC: 19 MMOL/L
CO2 BLDA-SCNC: 21 MMOL/L
CO2 BLDA-SCNC: 22 MMOL/L
CO2 BLDA-SCNC: 23 MMOL/L
CO2 BLDA-SCNC: 24 MMOL/L
CO2 BLDA-SCNC: 25 MMOL/L
CO2 SERPL-SCNC: 20 MMOL/L (ref 21–32)
CREAT SERPL-MCNC: 0.8 MG/DL (ref 0.9–1.3)
DEPRECATED RDW RBC AUTO: 13.2 % (ref 12.4–15.4)
ECHO BSA: 1.85 M2
ECHO LV EF PHYSICIAN: 50 %
ECHO LV FRACTIONAL SHORTENING: 24 % (ref 28–44)
ECHO LV INTERNAL DIMENSION DIASTOLE INDEX: 3.03 CM/M2
ECHO LV INTERNAL DIMENSION DIASTOLIC: 5.4 CM (ref 4.2–5.9)
ECHO LV INTERNAL DIMENSION SYSTOLIC INDEX: 2.3 CM/M2
ECHO LV INTERNAL DIMENSION SYSTOLIC: 4.1 CM
ECHO LV IVSD: 0.7 CM (ref 0.6–1)
ECHO LV MASS 2D: 131.2 G (ref 88–224)
ECHO LV MASS INDEX 2D: 73.7 G/M2 (ref 49–115)
ECHO LV POSTERIOR WALL DIASTOLIC: 0.7 CM (ref 0.6–1)
ECHO LV RELATIVE WALL THICKNESS RATIO: 0.26
ECHO LVOT AREA: 4.2 CM2
ECHO LVOT DIAM: 2.3 CM
ECHO LVOT MEAN GRADIENT: 1 MMHG
ECHO LVOT PEAK GRADIENT: 3 MMHG
ECHO LVOT PEAK VELOCITY: 0.8 M/S
ECHO LVOT STROKE VOLUME INDEX: 25.9 ML/M2
ECHO LVOT SV: 46.1 ML
ECHO LVOT VTI: 11.1 CM
ECHO MV AREA VTI: 1.8 CM2
ECHO MV LVOT VTI INDEX: 2.32
ECHO MV MAX VELOCITY: 1.2 M/S
ECHO MV MEAN GRADIENT: 2 MMHG
ECHO MV MEAN VELOCITY: 0.7 M/S
ECHO MV PEAK GRADIENT: 5 MMHG
ECHO MV VTI: 25.7 CM
ECHO RV FREE WALL PEAK S': 4.5 CM/S
ECHO RV INTERNAL DIMENSION: 3.6 CM
ECHO RV TAPSE: 0.5 CM (ref 1.7–?)
GFR SERPLBLD CREATININE-BSD FMLA CKD-EPI: >90 ML/MIN/{1.73_M2}
GLUCOSE BLD-MCNC: 100 MG/DL (ref 70–99)
GLUCOSE BLD-MCNC: 105 MG/DL (ref 70–99)
GLUCOSE BLD-MCNC: 110 MG/DL (ref 70–99)
GLUCOSE BLD-MCNC: 115 MG/DL (ref 70–99)
GLUCOSE BLD-MCNC: 124 MG/DL (ref 70–99)
GLUCOSE BLD-MCNC: 126 MG/DL (ref 70–99)
GLUCOSE BLD-MCNC: 127 MG/DL (ref 70–99)
GLUCOSE BLD-MCNC: 133 MG/DL (ref 70–99)
GLUCOSE BLD-MCNC: 152 MG/DL (ref 70–99)
GLUCOSE BLD-MCNC: 73 MG/DL (ref 70–99)
GLUCOSE BLD-MCNC: 92 MG/DL (ref 70–99)
GLUCOSE BLD-MCNC: 95 MG/DL (ref 70–99)
GLUCOSE BLD-MCNC: 99 MG/DL (ref 70–99)
GLUCOSE SERPL-MCNC: 110 MG/DL (ref 70–99)
HCO3 BLDA-SCNC: 17.8 MMOL/L (ref 21–29)
HCO3 BLDA-SCNC: 19.7 MMOL/L (ref 21–29)
HCO3 BLDA-SCNC: 20.2 MMOL/L (ref 21–29)
HCO3 BLDA-SCNC: 20.4 MMOL/L (ref 21–29)
HCO3 BLDA-SCNC: 20.5 MMOL/L (ref 21–29)
HCO3 BLDA-SCNC: 20.9 MMOL/L (ref 21–29)
HCO3 BLDA-SCNC: 21 MMOL/L (ref 21–29)
HCO3 BLDA-SCNC: 21.3 MMOL/L (ref 21–29)
HCO3 BLDA-SCNC: 22.1 MMOL/L (ref 21–29)
HCO3 BLDA-SCNC: 23 MMOL/L (ref 21–29)
HCO3 BLDA-SCNC: 23.7 MMOL/L (ref 21–29)
HCT VFR BLD AUTO: 24 % (ref 40.5–52.5)
HCT VFR BLD AUTO: 24 % (ref 40.5–52.5)
HCT VFR BLD AUTO: 25 % (ref 40.5–52.5)
HCT VFR BLD AUTO: 25.1 % (ref 40.5–52.5)
HCT VFR BLD AUTO: 26 % (ref 40.5–52.5)
HGB BLD CALC-MCNC: 8.1 GM/DL (ref 13.5–17.5)
HGB BLD CALC-MCNC: 8.3 GM/DL (ref 13.5–17.5)
HGB BLD CALC-MCNC: 8.3 GM/DL (ref 13.5–17.5)
HGB BLD CALC-MCNC: 8.4 GM/DL (ref 13.5–17.5)
HGB BLD CALC-MCNC: 8.4 GM/DL (ref 13.5–17.5)
HGB BLD CALC-MCNC: 8.6 GM/DL (ref 13.5–17.5)
HGB BLD CALC-MCNC: 8.6 GM/DL (ref 13.5–17.5)
HGB BLD CALC-MCNC: 8.7 GM/DL (ref 13.5–17.5)
HGB BLD CALC-MCNC: 8.8 GM/DL (ref 13.5–17.5)
HGB BLD CALC-MCNC: 8.8 GM/DL (ref 13.5–17.5)
HGB BLD-MCNC: 8.3 G/DL (ref 13.5–17.5)
INR PPP: 1.54 (ref 0.85–1.15)
LACTATE BLD-SCNC: 1.46 MMOL/L (ref 0.4–2)
LACTATE BLD-SCNC: 1.58 MMOL/L (ref 0.4–2)
LACTATE BLD-SCNC: 1.63 MMOL/L (ref 0.4–2)
LACTATE BLD-SCNC: 1.89 MMOL/L (ref 0.4–2)
LACTATE BLD-SCNC: 2.37 MMOL/L (ref 0.4–2)
LACTATE BLD-SCNC: 2.8 MMOL/L (ref 0.4–2)
LACTATE BLD-SCNC: 2.99 MMOL/L (ref 0.4–2)
LACTATE BLD-SCNC: 3 MMOL/L (ref 0.4–2)
LACTATE BLD-SCNC: 3.36 MMOL/L (ref 0.4–2)
LACTATE BLD-SCNC: 3.37 MMOL/L (ref 0.4–2)
LACTATE BLDV-SCNC: 2.4 MMOL/L (ref 0.4–2)
MAGNESIUM SERPL-MCNC: 2 MG/DL (ref 1.8–2.4)
MCH RBC QN AUTO: 29.1 PG (ref 26–34)
MCHC RBC AUTO-ENTMCNC: 32.9 G/DL (ref 31–36)
MCV RBC AUTO: 88.3 FL (ref 80–100)
PCO2 BLDA: 29.2 MM HG (ref 35–45)
PCO2 BLDA: 30.5 MM HG (ref 35–45)
PCO2 BLDA: 31.4 MM HG (ref 35–45)
PCO2 BLDA: 32 MM HG (ref 35–45)
PCO2 BLDA: 32.1 MM HG (ref 35–45)
PCO2 BLDA: 33.5 MM HG (ref 35–45)
PCO2 BLDA: 34.2 MM HG (ref 35–45)
PCO2 BLDA: 34.4 MM HG (ref 35–45)
PCO2 BLDA: 34.9 MM HG (ref 35–45)
PCO2 BLDA: 35.1 MM HG (ref 35–45)
PCO2 BLDA: 35.9 MM HG (ref 35–45)
PERFORMED ON: ABNORMAL
PERFORMED ON: NORMAL
PERFORMED ON: NORMAL
PH BLDA: 7.36 [PH] (ref 7.35–7.45)
PH BLDA: 7.37 [PH] (ref 7.35–7.45)
PH BLDA: 7.38 [PH] (ref 7.35–7.45)
PH BLDA: 7.39 [PH] (ref 7.35–7.45)
PH BLDA: 7.4 [PH] (ref 7.35–7.45)
PH BLDA: 7.41 [PH] (ref 7.35–7.45)
PH BLDA: 7.41 [PH] (ref 7.35–7.45)
PH BLDA: 7.42 [PH] (ref 7.35–7.45)
PH BLDA: 7.44 [PH] (ref 7.35–7.45)
PH BLDA: 7.45 [PH] (ref 7.35–7.45)
PH BLDA: 7.48 [PH] (ref 7.35–7.45)
PH BLDV: 7.46 [PH] (ref 7.35–7.45)
PLATELET # BLD AUTO: 95 K/UL (ref 135–450)
PLATELET BLD QL SMEAR: ABNORMAL
PMV BLD AUTO: 7.4 FL (ref 5–10.5)
PO2 BLDA: 101.8 MM HG (ref 75–108)
PO2 BLDA: 102.7 MM HG (ref 75–108)
PO2 BLDA: 104.1 MM HG (ref 75–108)
PO2 BLDA: 108 MM HG (ref 75–108)
PO2 BLDA: 81.5 MM HG (ref 75–108)
PO2 BLDA: 91.3 MM HG (ref 75–108)
PO2 BLDA: 93.4 MM HG (ref 75–108)
PO2 BLDA: 95 MM HG (ref 75–108)
PO2 BLDA: 96.3 MM HG (ref 75–108)
PO2 BLDA: 98.8 MM HG (ref 75–108)
PO2 BLDA: 99.8 MM HG (ref 75–108)
POC SAMPLE TYPE: ABNORMAL
POTASSIUM BLD-SCNC: 3.8 MMOL/L (ref 3.5–5.1)
POTASSIUM BLD-SCNC: 3.9 MMOL/L (ref 3.5–5.1)
POTASSIUM BLD-SCNC: 4 MMOL/L (ref 3.5–5.1)
POTASSIUM BLD-SCNC: 4.2 MMOL/L (ref 3.5–5.1)
POTASSIUM BLD-SCNC: 4.4 MMOL/L (ref 3.5–5.1)
POTASSIUM BLD-SCNC: 4.6 MMOL/L (ref 3.5–5.1)
POTASSIUM BLD-SCNC: 4.6 MMOL/L (ref 3.5–5.1)
POTASSIUM BLD-SCNC: 4.9 MMOL/L (ref 3.5–5.1)
POTASSIUM SERPL-SCNC: 4.4 MMOL/L (ref 3.5–5.1)
PROTHROMBIN TIME: 18.6 SEC (ref 11.9–14.9)
RBC # BLD AUTO: 2.84 M/UL (ref 4.2–5.9)
SAO2 % BLDA: 96 % (ref 93–100)
SAO2 % BLDA: 97 % (ref 93–100)
SAO2 % BLDA: 97 % (ref 93–100)
SAO2 % BLDA: 98 % (ref 93–100)
SAO2 % BLDA: 99 % (ref 93–100)
SLIDE REVIEW: ABNORMAL
SODIUM BLD-SCNC: 145 MMOL/L (ref 136–145)
SODIUM BLD-SCNC: 146 MMOL/L (ref 136–145)
SODIUM BLD-SCNC: 146 MMOL/L (ref 136–145)
SODIUM BLD-SCNC: 147 MMOL/L (ref 136–145)
SODIUM BLD-SCNC: 148 MMOL/L (ref 136–145)
SODIUM BLD-SCNC: 148 MMOL/L (ref 136–145)
SODIUM BLD-SCNC: 149 MMOL/L (ref 136–145)
SODIUM BLD-SCNC: 150 MMOL/L (ref 136–145)
SODIUM SERPL-SCNC: 141 MMOL/L (ref 136–145)
WBC # BLD AUTO: 7.7 K/UL (ref 4–11)

## 2024-10-22 PROCEDURE — 85014 HEMATOCRIT: CPT

## 2024-10-22 PROCEDURE — 93308 TTE F-UP OR LMTD: CPT | Performed by: INTERNAL MEDICINE

## 2024-10-22 PROCEDURE — 99291 CRITICAL CARE FIRST HOUR: CPT | Performed by: INTERNAL MEDICINE

## 2024-10-22 PROCEDURE — 6360000004 HC RX CONTRAST MEDICATION: Performed by: INTERNAL MEDICINE

## 2024-10-22 PROCEDURE — 6370000000 HC RX 637 (ALT 250 FOR IP): Performed by: STUDENT IN AN ORGANIZED HEALTH CARE EDUCATION/TRAINING PROGRAM

## 2024-10-22 PROCEDURE — 2580000003 HC RX 258: Performed by: INTERNAL MEDICINE

## 2024-10-22 PROCEDURE — 93325 DOPPLER ECHO COLOR FLOW MAPG: CPT

## 2024-10-22 PROCEDURE — 6360000002 HC RX W HCPCS: Performed by: STUDENT IN AN ORGANIZED HEALTH CARE EDUCATION/TRAINING PROGRAM

## 2024-10-22 PROCEDURE — 84295 ASSAY OF SERUM SODIUM: CPT

## 2024-10-22 PROCEDURE — 93321 DOPPLER ECHO F-UP/LMTD STD: CPT | Performed by: INTERNAL MEDICINE

## 2024-10-22 PROCEDURE — C8924 2D TTE W OR W/O FOL W/CON,FU: HCPCS

## 2024-10-22 PROCEDURE — P9045 ALBUMIN (HUMAN), 5%, 250 ML: HCPCS | Performed by: STUDENT IN AN ORGANIZED HEALTH CARE EDUCATION/TRAINING PROGRAM

## 2024-10-22 PROCEDURE — 2700000000 HC OXYGEN THERAPY PER DAY

## 2024-10-22 PROCEDURE — 84132 ASSAY OF SERUM POTASSIUM: CPT

## 2024-10-22 PROCEDURE — 93005 ELECTROCARDIOGRAM TRACING: CPT | Performed by: INTERNAL MEDICINE

## 2024-10-22 PROCEDURE — 85610 PROTHROMBIN TIME: CPT

## 2024-10-22 PROCEDURE — 94761 N-INVAS EAR/PLS OXIMETRY MLT: CPT

## 2024-10-22 PROCEDURE — 2500000003 HC RX 250 WO HCPCS: Performed by: STUDENT IN AN ORGANIZED HEALTH CARE EDUCATION/TRAINING PROGRAM

## 2024-10-22 PROCEDURE — 94003 VENT MGMT INPAT SUBQ DAY: CPT

## 2024-10-22 PROCEDURE — 85027 COMPLETE CBC AUTOMATED: CPT

## 2024-10-22 PROCEDURE — 6360000002 HC RX W HCPCS

## 2024-10-22 PROCEDURE — 82330 ASSAY OF CALCIUM: CPT

## 2024-10-22 PROCEDURE — 2500000003 HC RX 250 WO HCPCS

## 2024-10-22 PROCEDURE — 83605 ASSAY OF LACTIC ACID: CPT

## 2024-10-22 PROCEDURE — 82803 BLOOD GASES ANY COMBINATION: CPT

## 2024-10-22 PROCEDURE — 6370000000 HC RX 637 (ALT 250 FOR IP)

## 2024-10-22 PROCEDURE — 80048 BASIC METABOLIC PNL TOTAL CA: CPT

## 2024-10-22 PROCEDURE — 2100000000 HC CCU R&B

## 2024-10-22 PROCEDURE — 83735 ASSAY OF MAGNESIUM: CPT

## 2024-10-22 PROCEDURE — 71045 X-RAY EXAM CHEST 1 VIEW: CPT

## 2024-10-22 PROCEDURE — 2580000003 HC RX 258: Performed by: STUDENT IN AN ORGANIZED HEALTH CARE EDUCATION/TRAINING PROGRAM

## 2024-10-22 PROCEDURE — 93325 DOPPLER ECHO COLOR FLOW MAPG: CPT | Performed by: INTERNAL MEDICINE

## 2024-10-22 PROCEDURE — 82947 ASSAY GLUCOSE BLOOD QUANT: CPT

## 2024-10-22 PROCEDURE — 94640 AIRWAY INHALATION TREATMENT: CPT

## 2024-10-22 RX ORDER — 0.9 % SODIUM CHLORIDE 0.9 %
1000 INTRAVENOUS SOLUTION INTRAVENOUS ONCE
Status: COMPLETED | OUTPATIENT
Start: 2024-10-22 | End: 2024-10-22

## 2024-10-22 RX ADMIN — VASOPRESSIN IN 0.9% SODIUM CHLORIDE 0.03 UNITS/MIN: 20 INJECTION INTRAVENOUS at 23:57

## 2024-10-22 RX ADMIN — MILRINONE LACTATE 0.38 MCG/KG/MIN: 0.2 INJECTION, SOLUTION INTRAVENOUS at 04:47

## 2024-10-22 RX ADMIN — VASOPRESSIN IN 0.9% SODIUM CHLORIDE 0.03 UNITS/MIN: 20 INJECTION INTRAVENOUS at 17:02

## 2024-10-22 RX ADMIN — VANCOMYCIN 1000 MG: 1 INJECTION, SOLUTION INTRAVENOUS at 08:30

## 2024-10-22 RX ADMIN — MILRINONE LACTATE 0.38 MCG/KG/MIN: 0.2 INJECTION, SOLUTION INTRAVENOUS at 14:36

## 2024-10-22 RX ADMIN — Medication 400 MG: at 08:14

## 2024-10-22 RX ADMIN — POTASSIUM CHLORIDE 10 MEQ: 750 TABLET, EXTENDED RELEASE ORAL at 16:51

## 2024-10-22 RX ADMIN — FAMOTIDINE 20 MG: 10 INJECTION, SOLUTION INTRAVENOUS at 20:22

## 2024-10-22 RX ADMIN — OXYCODONE 10 MG: 5 TABLET ORAL at 12:40

## 2024-10-22 RX ADMIN — ACETAMINOPHEN 1000 MG: 500 TABLET ORAL at 12:40

## 2024-10-22 RX ADMIN — FONDAPARINUX SODIUM 2.5 MG: 2.5 INJECTION, SOLUTION SUBCUTANEOUS at 08:14

## 2024-10-22 RX ADMIN — ATORVASTATIN CALCIUM 40 MG: 40 TABLET, FILM COATED ORAL at 20:49

## 2024-10-22 RX ADMIN — PROPOFOL 30 MCG/KG/MIN: 10 INJECTION, EMULSION INTRAVENOUS at 07:12

## 2024-10-22 RX ADMIN — POTASSIUM CHLORIDE 10 MEQ: 750 TABLET, EXTENDED RELEASE ORAL at 08:14

## 2024-10-22 RX ADMIN — POTASSIUM CHLORIDE 20 MEQ: 29.8 INJECTION, SOLUTION INTRAVENOUS at 05:30

## 2024-10-22 RX ADMIN — POTASSIUM CHLORIDE 10 MEQ: 750 TABLET, EXTENDED RELEASE ORAL at 12:40

## 2024-10-22 RX ADMIN — PROPOFOL 40 MCG/KG/MIN: 10 INJECTION, EMULSION INTRAVENOUS at 14:04

## 2024-10-22 RX ADMIN — ALBUTEROL SULFATE 2.5 MG: 2.5 SOLUTION RESPIRATORY (INHALATION) at 19:34

## 2024-10-22 RX ADMIN — ALBUTEROL SULFATE 2.5 MG: 2.5 SOLUTION RESPIRATORY (INHALATION) at 11:42

## 2024-10-22 RX ADMIN — OXYCODONE 10 MG: 5 TABLET ORAL at 19:51

## 2024-10-22 RX ADMIN — CEFAZOLIN 2000 MG: 2 INJECTION, POWDER, FOR SOLUTION INTRAVENOUS at 16:19

## 2024-10-22 RX ADMIN — SODIUM CHLORIDE 1000 ML: 9 INJECTION, SOLUTION INTRAVENOUS at 10:00

## 2024-10-22 RX ADMIN — POTASSIUM CHLORIDE 20 MEQ: 29.8 INJECTION, SOLUTION INTRAVENOUS at 12:50

## 2024-10-22 RX ADMIN — ACETAMINOPHEN 1000 MG: 500 TABLET ORAL at 23:43

## 2024-10-22 RX ADMIN — ACETAMINOPHEN 1000 MG: 500 TABLET ORAL at 04:48

## 2024-10-22 RX ADMIN — CEFAZOLIN 2000 MG: 2 INJECTION, POWDER, FOR SOLUTION INTRAVENOUS at 07:32

## 2024-10-22 RX ADMIN — ALBUMIN (HUMAN) 25 G: 12.5 INJECTION, SOLUTION INTRAVENOUS at 00:54

## 2024-10-22 RX ADMIN — VANCOMYCIN 1000 MG: 1 INJECTION, SOLUTION INTRAVENOUS at 20:21

## 2024-10-22 RX ADMIN — ACETAMINOPHEN 1000 MG: 500 TABLET ORAL at 16:51

## 2024-10-22 RX ADMIN — Medication 400 MG: at 20:49

## 2024-10-22 RX ADMIN — POTASSIUM CHLORIDE 20 MEQ: 29.8 INJECTION, SOLUTION INTRAVENOUS at 14:03

## 2024-10-22 RX ADMIN — SODIUM BICARBONATE 50 MEQ: 84 INJECTION INTRAVENOUS at 20:49

## 2024-10-22 RX ADMIN — CEFAZOLIN 2000 MG: 2 INJECTION, POWDER, FOR SOLUTION INTRAVENOUS at 23:42

## 2024-10-22 RX ADMIN — OXYCODONE 10 MG: 5 TABLET ORAL at 08:13

## 2024-10-22 RX ADMIN — Medication 0.2 MCG/KG/HR: at 02:45

## 2024-10-22 RX ADMIN — PROPOFOL 30 MCG/KG/MIN: 10 INJECTION, EMULSION INTRAVENOUS at 20:24

## 2024-10-22 RX ADMIN — CLOPIDOGREL BISULFATE 75 MG: 75 TABLET ORAL at 08:14

## 2024-10-22 RX ADMIN — ALBUTEROL SULFATE 2.5 MG: 2.5 SOLUTION RESPIRATORY (INHALATION) at 08:03

## 2024-10-22 RX ADMIN — SODIUM CHLORIDE 1000 ML: 9 INJECTION, SOLUTION INTRAVENOUS at 13:23

## 2024-10-22 RX ADMIN — MUPIROCIN: 20 OINTMENT TOPICAL at 20:49

## 2024-10-22 RX ADMIN — CHLORHEXIDINE GLUCONATE 15 ML: 1.2 RINSE ORAL at 20:22

## 2024-10-22 RX ADMIN — FAMOTIDINE 20 MG: 10 INJECTION, SOLUTION INTRAVENOUS at 08:13

## 2024-10-22 RX ADMIN — ASPIRIN 81 MG 81 MG: 81 TABLET ORAL at 08:14

## 2024-10-22 RX ADMIN — CALCIUM CHLORIDE 1000 MG: 100 INJECTION INTRAVENOUS; INTRAVENTRICULAR at 06:15

## 2024-10-22 RX ADMIN — Medication 10 ML: at 21:03

## 2024-10-22 RX ADMIN — PERFLUTREN 1.5 ML: 6.52 INJECTION, SUSPENSION INTRAVENOUS at 15:05

## 2024-10-22 RX ADMIN — Medication 0.3 MCG/KG/HR: at 19:54

## 2024-10-22 RX ADMIN — SODIUM CHLORIDE 1000 ML: 9 INJECTION, SOLUTION INTRAVENOUS at 12:00

## 2024-10-22 RX ADMIN — ALBUTEROL SULFATE 2.5 MG: 2.5 SOLUTION RESPIRATORY (INHALATION) at 15:26

## 2024-10-22 RX ADMIN — POTASSIUM CHLORIDE 20 MEQ: 29.8 INJECTION, SOLUTION INTRAVENOUS at 00:10

## 2024-10-22 RX ADMIN — FUROSEMIDE 20 MG: 10 INJECTION, SOLUTION INTRAMUSCULAR; INTRAVENOUS at 08:14

## 2024-10-22 RX ADMIN — DEXTROSE AND SODIUM CHLORIDE: 5; 450 INJECTION, SOLUTION INTRAVENOUS at 06:13

## 2024-10-22 ASSESSMENT — PULMONARY FUNCTION TESTS
PIF_VALUE: 25
PIF_VALUE: 22
PIF_VALUE: 24
PIF_VALUE: 24
PIF_VALUE: 20
PIF_VALUE: 23
PIF_VALUE: 23
PIF_VALUE: 20
PIF_VALUE: 22
PIF_VALUE: 27
PIF_VALUE: 29
PIF_VALUE: 22
PIF_VALUE: 24
PIF_VALUE: 23
PIF_VALUE: 25
PIF_VALUE: 23
PIF_VALUE: 22
PIF_VALUE: 20
PIF_VALUE: 21
PIF_VALUE: 24
PIF_VALUE: 21
PIF_VALUE: 21
PIF_VALUE: 20
PIF_VALUE: 24
PIF_VALUE: 21
PIF_VALUE: 20
PIF_VALUE: 22
PIF_VALUE: 25
PIF_VALUE: 21
PIF_VALUE: 20
PIF_VALUE: 20
PIF_VALUE: 24
PIF_VALUE: 24
PIF_VALUE: 25
PIF_VALUE: 28
PIF_VALUE: 31
PIF_VALUE: 23
PIF_VALUE: 20
PIF_VALUE: 26
PIF_VALUE: 20
PIF_VALUE: 20
PIF_VALUE: 24
PIF_VALUE: 19
PIF_VALUE: 22
PIF_VALUE: 20
PIF_VALUE: 36
PIF_VALUE: 25
PIF_VALUE: 20
PIF_VALUE: 21
PIF_VALUE: 21
PIF_VALUE: 24
PIF_VALUE: 27
PIF_VALUE: 24
PIF_VALUE: 24
PIF_VALUE: 21
PIF_VALUE: 19

## 2024-10-22 ASSESSMENT — PAIN SCALES - GENERAL
PAINLEVEL_OUTOF10: 2
PAINLEVEL_OUTOF10: 4
PAINLEVEL_OUTOF10: 6
PAINLEVEL_OUTOF10: 0
PAINLEVEL_OUTOF10: 0
PAINLEVEL_OUTOF10: 10

## 2024-10-22 NOTE — PROGRESS NOTES
10/22/24 0334   Patient Observation   Pulse 91   Respirations 17   SpO2 100 %   Vent Information   Vent Mode AC/VC   Ventilator Settings   Vt (Set, mL) 500 mL   Resp Rate (Set) 16 bpm   PEEP/CPAP (cmH2O) 5   FiO2  30 %   Vent Patient Data (Readings)   Vt (Measured) 532 mL   Peak Inspiratory Pressure (cmH2O) 23 cmH2O   Rate Measured 17 br/min   Minute Volume (L/min) 8.93 Liters   Mean Airway Pressure (cmH2O) 11 cmH20   Plateau Pressure (cm H2O) 0 cm H2O   Driving Pressure -5   I:E Ratio 1:2.10   Vent Alarm Settings   High Pressure (cmH2O) 45 cmH2O   Low Minute Volume (lpm) 2 L/min   High Minute Volume (lpm) 20 L/min   Low Exhaled Vt (ml) 200 mL   High Exhaled Vt (ml) 1000 mL   RR High (bpm) 40 br/min   Additional Respiratoray Assessments   Humidification Source Heated wire   Humidification Temp 37   Circuit Condensation Drained   ETT    Placement Date/Time: 10/21/24 0715   Present on Admission/Arrival: No  Placed By: In surgery  Placement Verified By: Auscultation;Capnometry  Preoxygenation: Yes  Mask Ventilation: Ventilated by mask (1)  Technique: Direct laryngoscopy  Airway Type: C...   Secured At 22 cm   Measured From Lips   ETT Placement Right   Cuff Pressure 30 cm H2O

## 2024-10-22 NOTE — CONSULTS
Lateral Velocity: 11.9 cm/s   E/E' ratio: 8.4     Aorta      Aortic Root: 3.1 cm          Telemetry:    Sinus rhythm and occasional junctional bradycardia, brief NSVT and occasional PVC's    Lab Review:    Recent Labs     10/21/24  1200 10/21/24  1203 10/22/24  0412 10/22/24  0420 10/22/24  0538   WBC 22.2*  --   --  7.7  --    HGB 10.9*   < > 8.1* 8.3* 8.3*   HCT 33.3*  --   --  25.1*  --    MCV 89.1  --   --  88.3  --      --   --  95*  --     < > = values in this interval not displayed.       Recent Labs     10/21/24  1200 10/21/24  2307 10/22/24  0420     --  141   K 4.3 4.3 4.4   *  --  112*   CO2 21  --  20*   BUN 9  --  12   CREATININE 0.7*  --  0.8*       Recent Labs     10/21/24  1200 10/22/24  0420   INR 1.52* 1.54*   PROTIME 18.5* 18.6*       No results for input(s): \"CKMB\", \"TROPONINI\" in the last 72 hours.    No results for input(s): \"PROBNP\" in the last 72 hours.    Imaging:    XR CHEST PORTABLE    Result Date: 10/22/2024  No acute pulmonary process.     XR ABDOMEN (KUB) (SINGLE AP VIEW)    Result Date: 10/21/2024  Satisfactory placement of the enteric tube.     XR CHEST PORTABLE    Result Date: 10/21/2024  1.  Left internal jugular [place]-Gio catheter tip at the level of the proximal right pulmonary artery. 2.  Enteric tube course off the field of view in the the upper abdomen.     XR CHEST PORTABLE    Result Date: 10/21/2024  Stable appearance of the chest     XR CHEST PORTABLE    Result Date: 10/21/2024  No acute process.     Patient was seen in the ICU. Due to the high probability of clinically significant life threating deterioration of the patient's condition, a total critical care time 32 minutes was used. This time excludes any time spent performing procedures. Time includes, but not limited to, review of vital signs, telemetry monitoring, continuous pulse oximety, IV medications, clinical response to medications and cardiovascular test results. It also includes documentation  time, consultation time, interpretation of lab data, and review of nursing notes and available old records.        Signed by: Werner Palomo MD

## 2024-10-22 NOTE — PROGRESS NOTES
Shift: [id number]    Procedure: OPEN MITRAL VALVE REPAIR; LEFT ATRIAL APPENDAGE CLIP PLACEMENT; LONGITIDUNAL STERNAL STABILIZATION; TRANSESOPHAGEAL ECHOCARDIOGRAM; BILATERAL PECTORALIS BLOCKS; TOTAL CARDIOPULMONARY BYPASS     Admit from OR (time and date): 10/21/24 @ 12:00    Transition (time and date): n/a    Surgery, return to OR no     Nursing assessment at handoff  stable    Most recent vitals: BP (!) 103/57   Pulse 88   Temp 98.9 °F (37.2 °C) (Bladder)   Resp (!) 9   Ht 1.702 m (5' 7\")   Wt 67.4 kg (148 lb 9.4 oz)   SpO2 97%   BMI 23.27 kg/m²      Increased O2 requirements: no O2 requirements: Oxygen Therapy  SpO2: 97 %  Pulse Oximeter Device Mode: Continuous  O2 Device: Ventilator  FiO2 : 25 %  Vent Mode: AC/PRVC     Admission weight Weight - Scale: 72.6 kg (160 lb)  Today's weight   Wt Readings from Last 1 Encounters:   10/22/24 67.4 kg (148 lb 9.4 oz)         EF: 43%    Drop in Urinary Output no     Rhythm Changes SR, accelerated junctional    Pacing Wires Removed:  [] Yes  [x] NO  [] Platelets < 50,000  [] Arrhythmia  [] Bradycardia [] Valve Replacement  [] Pacing for Cardiac Index  []Physician Order    Lines/Drains  LDA Insertion Date Discontinued Date Dressing Changes   Art line 10/21     Central Line  SWAN 10/21     Rosa 10/21     Chest Tube 10/21     Wires  10/21     ETT 10/21     MYRTLE Drain      VasCath      Impella        Interventions After Office Hours  Problem(Brief) Date Time Intervention Physician contacted   Reduced index/output  10/22 1am Volume  Denisse                                         Drip rates at handoff:    sodium chloride      norepinephrine 4 mcg/min (10/22/24 1727)    niCARdipine      insulin 1.3 Units/hr (10/22/24 1841)    dextrose      DOBUTamine 2 mcg/kg/min (10/22/24 1727)    sodium chloride      dexmedeTOMIDine 0.2 mcg/kg/hr (10/22/24 1727)    EPINEPHrine      propofol 40 mcg/kg/min (10/22/24 1727)    milrinone 0.375 mcg/kg/min (10/22/24 1727)    VASOpressin 0.03 Units/min

## 2024-10-22 NOTE — PROGRESS NOTES
10/22/24 1939   Patient Observation   Pulse (!) 104   Respirations 13   SpO2 100 %   Ventilator Settings   Vt (Set, mL) 500 mL   Resp Rate (Set) 14 bpm   PEEP/CPAP (cmH2O) 5   FiO2  25 %   Vent Patient Data (Readings)   Vt (Measured) 495 mL   Peak Inspiratory Pressure (cmH2O) 22 cmH2O   Rate Measured 19 br/min   Minute Volume (L/min) 9.63 Liters   Mean Airway Pressure (cmH2O) 9.8 cmH20   Plateau Pressure (cm H2O) 0 cm H2O   Driving Pressure -5   I:E Ratio 1:2.00   I Time/ I Time % 1 s   Backup Apnea On   Backup Rate 14 Breaths Per Minute   Backup Vt 500   Vent Alarm Settings   High Pressure (cmH2O) 45 cmH2O   Low Minute Volume (lpm) 2 L/min   High Minute Volume (lpm) 20 L/min   Low Exhaled Vt (ml) 200 mL   High Exhaled Vt (ml) 100 mL   RR High (bpm) 40 br/min   Apnea (secs) 20 secs   Additional Respiratoray Assessments   Humidification Source Heated wire   Humidification Temp 36.8   Circuit Condensation Drained   Ambu Bag With Mask At Bedside Yes   Airway Clearance   Suction ET Tube   Suction Device Inline suction catheter   Sputum Method Obtained Endotracheal   Sputum Amount Scant   Sputum Color/Odor Clear   Sputum Consistency Thin   ETT    Placement Date/Time: 10/21/24 0715   Present on Admission/Arrival: No  Placed By: In surgery  Placement Verified By: Auscultation;Capnometry  Preoxygenation: Yes  Mask Ventilation: Ventilated by mask (1)  Technique: Direct laryngoscopy  Airway Type: C...   Secured At 22 cm   Measured From Lips   ETT Placement Right   Secured By Commercial tube king   Site Assessment Dry   Cuff Pressure 28 cm H2O   Tie/King Changed No

## 2024-10-22 NOTE — CARE COORDINATION
Case Management Assessment  Initial Evaluation    Date/Time of Evaluation: 10/22/2024 11:02 AM  Assessment Completed by: Layne Prado RN    If patient is discharged prior to next notation, then this note serves as note for discharge by case management.    Patient Name: Tomer Amaya                   YOB: 1975  Diagnosis: Mitral regurgitation [I34.0]  Severe mitral regurgitation [I34.0]  Mitral valve disorder [I05.9]                   Date / Time: 10/21/2024  5:22 AM    Patient Admission Status: Inpatient   Readmission Risk (Low < 19, Mod (19-27), High > 27): Readmission Risk Score: 10.5    Current PCP: Cristina Interiano, DO  PCP verified by CM? Yes    Chart Reviewed: Yes      History Provided by: Other (see comment) (dad)  Patient Orientation: Unable to Assess (on vent)    Patient Cognition: Other (see comment) (on vent)    Hospitalization in the last 30 days (Readmission):  No    If yes, Readmission Assessment in CM Navigator will be completed.    Advance Directives:      Code Status: Full Code   Patient's Primary Decision Maker is: Legal Next of Kin    Primary Decision Maker: Enrique Amaya - Parent - [phone]    Primary Decision Maker: ChriscaseyPeter Withee - Parent - [phone]    Discharge Planning:    Patient lives with: Parent Type of Home: House  Primary Care Giver: Self  Patient Support Systems include: Parent   Current Financial resources: Other (Comment)  Current community resources: None  Current services prior to admission: None            Current DME:              Type of Home Care services:  None    ADLS  Prior functional level: Independent in ADLs/IADLs  Current functional level: Assistance with the following:, Bathing, Dressing, Toileting, Feeding, Mobility    PT AM-PAC:   /24  OT AM-PAC:   /24    Family can provide assistance at DC: Yes  Would you like Case Management to discuss the discharge plan with any other family members/significant others, and if so, who? Yes

## 2024-10-22 NOTE — PROGRESS NOTES
CV[facility] Cardiothoracic Progress Note:                                CC:  Post op follow up     Surgery: 10/21/24 OPEN MITRAL VALVE REPAIR; LEFT ATRIAL APPENDAGE CLIP PLACEMENT; LONGITIDUNAL STERNAL STABILIZATION; TRANSESOPHAGEAL ECHOCARDIOGRAM; BILATERAL PECTORALIS BLOCKS; TOTAL CARDIOPULMONARY BYPASS      Hospital course:    DOS 10/21- Out of OR @ 12. 2 unit cryo given. Junctional rhythm w/ hemodynamic instability. STAT ECHO. RV dilation and dysfunction noted. Veletri stopped 2/2 VT.     POD #1 10/22- Remains intubated. Plan for temp Awire placement today w/ EP. Extubation pending placement of A wire.  On Milrinone and Dobutamine. Plan to slowly wean over next couple days. CI 3.1, CO 5.7, PAPA 23/19 (21).      Past Medical History:   Diagnosis Date    Anxiety     Hypertension     Mitral valve disease         Past Surgical History:   Procedure Laterality Date    CARDIAC PROCEDURE N/A 05/15/2024    Left and right heart cath / coronary angiography performed by Jaycob Chappell MD at [facility] CARDIAC CATH LAB    INVASIVE VASCULAR N/A 05/15/2024    Ultrasound guided vascular access performed by Jaycob Chappell MD at [facility] CARDIAC CATH LAB    MITRAL VALVE REPAIR N/A 10/21/2024    OPEN MITRAL VALVE REPAIR; LEFT ATRIAL APPENDAGE CLIP PLACEMENT; LONGITIDUNAL STERNAL STABILIZATION; TRANSESOPHAGEAL ECHOCARDIOGRAM; BILATERAL PECTORALIS BLOCKS; TOTAL CARDIOPULMONARY BYPASS performed by Igor Landon MD at [facility] CVOR    WISDOM TOOTH EXTRACTION          Allergies as of 10/01/2024    (No Known Allergies)        Patient Active Problem List   Diagnosis    Fatty liver    Tachycardia    Hypertension    Anxiety    Bigeminy    Mitral regurgitation    Severe mitral regurgitation    Mitral valve disorder    Acute myocardial infarction of right ventricle (HCC)        Vital Signs: /64   Pulse 86   Temp 98.8 °F (37.1 °C)   Resp 15   Ht 1.702 m (5' 7\")   Wt 67.4 kg (148 lb 9.4 oz)   SpO2 98%   BMI 23.27 kg/m²        Admission Weight:  while intubated  - Daily weight and I&O  - Daily BMP    5. Endocrinology:   - No Hx DM. A1C 5.0 pre-op  - Continue insulin gtt x 48 hr per post op protocol    6. Hematology:   - Acute blood loss anemia. Hgb 8.3 this AM.   - Transfuse PRN for Hgb < 7  - Thrombocytopenia. Plt 95 this AM. Likely consumptive. Monitor.   - Daily CBC    7. Microbiology:   - WBC 7.7 . Afebrile. Monitor.   - Daily CBC    8. GI:  - Continue scheduled post op bowel regimen. Mirlax and Dulcolax PO  - PRN MOM and dulcolax suppository    9. Nutrition:   - NPO while intubated    10. Labs:   - labs and imaging reviewed as above  - E-lyte replacement PRN per post op protocol     11. Post-op Drains/Wires: Keeping all for now    CVC (10/21)  Arterial line (10/21)  F/c (10/21)    12. D/C plan: Pending PT/OT recs and clinical course. CM following    13. Continue post-op care of patient in the ICU    GI prophylaxis: Pepcid  DVT prophylaxis: Heparin   ________________________________________________________________________  KAREN Gooden - CNP,   10/22/2024  8:30 AM

## 2024-10-22 NOTE — PROGRESS NOTES
Comprehensive Nutrition Assessment    Type and Reason for Visit:  Initial    Nutrition Recommendations/Plan:   When medically appropriate, recommend TF initiation. Order: \"Diet: Tube feed continuous/ NPO\".  Initiate Vital AF (peptide based formula) at 10 mL/hr and as tolerated, increase by 10 mL/hr q 4 hours until goal of 45 mL/hr is met via O/G  Recommend 60 mL H20 flush q 4 hours.  Monitor IVF infusion, Na labs and need for adjustments in water flush  Recommend 1 Bottle Proteinex P2Go daily via syringe. Flush with 30 mL H20 before and after  Monitor TF tolerance (abd distention, bowel habits, N/V, cramping)  Check TG while on diprivan infusion  Monitor nutrition adequacy, pertinent labs, bowel habits, wt changes, and clinical progress     Malnutrition Assessment:  Malnutrition Status:  At risk for malnutrition (10/22/24 1242)    Context:  Acute Illness     Findings of the 6 clinical characteristics of malnutrition:  Energy Intake:  Mild decrease in energy intake   Weight Loss:  Unable to assess     Body Fat Loss:  Unable to assess     Muscle Mass Loss:  Unable to assess    Fluid Accumulation:  Unable to assess     Strength:  Not Performed    Nutrition Assessment:    New vent. S/p MVR with JANINE clip on 10/21. Remains intubated. Sedated on precedex and propogol at 13 mL/hr to provide 343 kcal from lipids. +dobutamine and milrinone. OG in place. No plans for nutrition at this time. Will monitor.    Nutrition Related Findings:    Active PERNELL SOTOMAYORL. OG. Wound Type: Surgical Incision       Current Nutrition Intake & Therapies:    Average Meal Intake: NPO  Average Supplements Intake: NPO  ADULT DIET; Regular  Current Tube Feeding (TF) Orders:  Feeding Route: Orogastric  Formula: Peptide Based  Schedule: Continuous  Goal TF & Flush Orders Provides: Vital AF at goal rate of 45 mL/hr x20 hours to provide 900 mL TV, 1080 kcal, 67 g protein, and 730 mL free water. +1 bottle of proteinex daily for additional 104 kcal and  26 g protein. +60 mL free water flush q 4 hrs.    Anthropometric Measures:  Height: 170.2 cm (5' 7\")  Ideal Body Weight (IBW): 148 lbs (67 kg)       Current Body Weight: 67.1 kg (148 lb), 100 % IBW. Weight Source: Bed Scale  Current BMI (kg/m2): 23.2  Usual Body Weight: 71.7 kg (158 lb) (standing scale on 1/12/24)  % Weight Change (Calculated): -6.3                    BMI Categories: Normal Weight (BMI 18.5-24.9)    Estimated Daily Nutrient Needs:  Energy Requirements Based On: Kcal/kg (20-25)  Weight Used for Energy Requirements: Current  Energy (kcal/day): 1834-9323 kcal  Weight Used for Protein Requirements: Current (1.2-2.0 g/kg)  Protein (g/day):  g  Method Used for Fluid Requirements: 1 ml/kcal  Fluid (ml/day): 3455-8441 mL    Nutrition Diagnosis:   Inadequate oral intake related to impaired respiratory function, cardiac dysfunction as evidenced by NPO or clear liquid status due to medical condition, intubation    Nutrition Interventions:   Food and/or Nutrient Delivery: Continue NPO, Start Tube Feeding  Nutrition Education/Counseling: No recommendation at this time  Coordination of Nutrition Care: Continue to monitor while inpatient       Goals:     Goals: Initiate nutrition support, by next RD assessment       Nutrition Monitoring and Evaluation:   Behavioral-Environmental Outcomes: None Identified  Food/Nutrient Intake Outcomes: Enteral Nutrition Intake/Tolerance  Physical Signs/Symptoms Outcomes: Biochemical Data, Nutrition Focused Physical Findings, Weight    Discharge Planning:    Too soon to determine     Antonina Arevalo, MS, RD, LD  Contact: [phone]

## 2024-10-22 NOTE — PROGRESS NOTES
Occupational/ Physical Therapy    Therapy orders received and pt chart reviewed. Per RN, pt still heavily sedated with intermittent/ inconsistent command follow. Pt inappropriate for therapy this date as result, will plan to follow up tomorrow or later this date if sedatives weaned. Thank you.     David Valdivia, OTR/L   Ashli Hou, PT, DPT

## 2024-10-22 NOTE — PROGRESS NOTES
10/22/24 1530   Patient Observation   Pulse 94   Respirations 14   SpO2 100 %   Ventilator Settings   Vt (Set, mL) 500 mL   Resp Rate (Set) 14 bpm   PEEP/CPAP (cmH2O) 5   FiO2  (S)  25 %   Vent Patient Data (Readings)   Vt (Measured) 507 mL   Peak Inspiratory Pressure (cmH2O) 25 cmH2O   Rate Measured 14 br/min   Minute Volume (L/min) 7.41 Liters   Mean Airway Pressure (cmH2O) 9.8 cmH20   Plateau Pressure (cm H2O) 0 cm H2O   Driving Pressure -5   I:E Ratio 1:3.10   I Time/ I Time % 1 s   Backup Apnea On   Backup Rate 14 Breaths Per Minute   Backup Vt 500   Vent Alarm Settings   High Pressure (cmH2O) 45 cmH2O   Low Minute Volume (lpm) 2 L/min   High Minute Volume (lpm) 20 L/min   Low Exhaled Vt (ml) 200 mL   High Exhaled Vt (ml) 1000 mL   RR High (bpm) 40 br/min   Apnea (secs) 20 secs   Additional Respiratoray Assessments   Humidification Source Heated wire   Humidification Temp 37.1   Circuit Condensation Drained   Ambu Bag With Mask At Bedside Yes   ETT    Placement Date/Time: 10/21/24 0715   Present on Admission/Arrival: No  Placed By: In surgery  Placement Verified By: Auscultation;Capnometry  Preoxygenation: Yes  Mask Ventilation: Ventilated by mask (1)  Technique: Direct laryngoscopy  Airway Type: C...   Secured At 22 cm   Measured From Lips   ETT Placement Center   Secured By Commercial tube king   Site Assessment Dry   Cuff Pressure 28 cm H2O   Tie/King Changed No

## 2024-10-22 NOTE — PROGRESS NOTES
10/21/24 2329   Vent Information   Vent Mode AC/VC   Ventilator Settings   Vt (Set, mL) 500 mL   Resp Rate (Set) 16 bpm   PEEP/CPAP (cmH2O) 5   FiO2  30 %   Vent Patient Data (Readings)   Vt (Measured) 530 mL   Peak Inspiratory Pressure (cmH2O) 25 cmH2O   Rate Measured 17 br/min   Minute Volume (L/min) 8.92 Liters   Mean Airway Pressure (cmH2O) 11 cmH20   Plateau Pressure (cm H2O) 0 cm H2O   Driving Pressure -5   I:E Ratio 1:1.90   Vent Alarm Settings   High Pressure (cmH2O) 45 cmH2O   Low Minute Volume (lpm) 2 L/min   High Minute Volume (lpm) 20 L/min   Low Exhaled Vt (ml) 200 mL   High Exhaled Vt (ml) 1000 mL   RR High (bpm) 40 br/min   Additional Respiratoray Assessments   Humidification Source Heated wire   Humidification Temp 37   Circuit Condensation Drained   ETT    Placement Date/Time: 10/21/24 0715   Present on Admission/Arrival: No  Placed By: In surgery  Placement Verified By: Auscultation;Capnometry  Preoxygenation: Yes  Mask Ventilation: Ventilated by mask (1)  Technique: Direct laryngoscopy  Airway Type: C...   Secured At 22 cm   Measured From Lips   ETT Placement Left   Secured By Commercial tube huitron   Cuff Pressure 30 cm H2O

## 2024-10-22 NOTE — PROGRESS NOTES
10/22/24 1143   Patient Observation   Pulse 88   Respirations 14   SpO2 99 %   Patient Observations ambu @ bedside   Breath Sounds   Right Upper Lobe Diminished   Right Middle Lobe Diminished   Right Lower Lobe Diminished   Left Upper Lobe Diminished   Left Lower Lobe Diminished   Vent Information   Vent Mode AC/VC   Ventilator Settings   Vt (Set, mL) 500 mL   Resp Rate (Set) 14 bpm   PEEP/CPAP (cmH2O) 5   FiO2  30 %   Vent Patient Data (Readings)   Vt (Measured) 509 mL   Peak Inspiratory Pressure (cmH2O) 23 cmH2O   Rate Measured 14 br/min   Minute Volume (L/min) 7.11 Liters   Mean Airway Pressure (cmH2O) 9 cmH20   Plateau Pressure (cm H2O) 0 cm H2O   Driving Pressure -5   I:E Ratio 1:3.30   I Time/ I Time % 1 s   Vent Alarm Settings   High Pressure (cmH2O) 45 cmH2O   Low Minute Volume (lpm) 2 L/min   RR High (bpm) 40 br/min   ETT    Placement Date/Time: 10/21/24 0715   Present on Admission/Arrival: No  Placed By: In surgery  Placement Verified By: Auscultation;Capnometry  Preoxygenation: Yes  Mask Ventilation: Ventilated by mask (1)  Technique: Direct laryngoscopy  Airway Type: C...   Secured At 22 cm   Measured From Lips   ETT Placement Center   Secured By Commercial tube huitron   Site Assessment Dry   Cuff Pressure 30 cm H2O

## 2024-10-22 NOTE — PROGRESS NOTES
Shift: [id number]    Procedure: OPEN MITRAL VALVE REPAIR; LEFT ATRIAL APPENDAGE CLIP PLACEMENT; LONGITIDUNAL STERNAL STABILIZATION; TRANSESOPHAGEAL ECHOCARDIOGRAM; BILATERAL PECTORALIS BLOCKS; TOTAL CARDIOPULMONARY BYPASS     Admit from OR (time and date): 10/21/24 @ 12:00    Transition (time and date): n/a    Surgery, return to OR no     Nursing assessment at handoff  stable    Most recent vitals: BP (!) 79/49   Pulse 87   Temp 98.8 °F (37.1 °C)   Resp 16   Ht 1.702 m (5' 7\")   Wt 72 kg (158 lb 12.8 oz)   SpO2 100%   BMI 24.87 kg/m²      Increased O2 requirements: no O2 requirements: Oxygen Therapy  SpO2: 100 %  Pulse Oximeter Device Mode: Continuous  O2 Device: Ventilator  FiO2 : 30 %  Vent Mode: AC/VC     Admission weight Weight - Scale: 72.6 kg (160 lb)  Today's weight   Wt Readings from Last 1 Encounters:   10/21/24 72 kg (158 lb 12.8 oz)         EF: 43%    Drop in Urinary Output no     Rhythm Changes SR, accelerated junctional    Pacing Wires Removed:  [] Yes  [x] NO  [] Platelets < 50,000  [] Arrhythmia  [] Bradycardia [] Valve Replacement  [] Pacing for Cardiac Index  []Physician Order    Lines/Drains  LDA Insertion Date Discontinued Date Dressing Changes   Art line 10/21     Central Line  SWAN 10/21     Rosa 10/21     Chest Tube 10/21     Wires  10/21     ETT 10/21     MYRTLE Drain      VasCath      Impella        Interventions After Office Hours  Problem(Brief) Date Time Intervention Physician contacted   Reduced index/output  10/22 1am Volume  Denisse                                         Drip rates at handoff:    dextrose 5 % and 0.45 % NaCl 75 mL/hr at 10/22/24 0310    sodium chloride      norepinephrine Stopped (10/21/24 2120)    niCARdipine      insulin 2.5 Units/hr (10/22/24 0420)    dextrose      DOBUTamine 2 mcg/kg/min (10/22/24 0310)    sodium chloride      dexmedeTOMIDine 0.2 mcg/kg/hr (10/22/24 0310)    amiodarone      EPINEPHrine      epoprostenol 1,500 mcg in sodium chloride 0.9 % 50 mL

## 2024-10-22 NOTE — PROGRESS NOTES
[facility]   Daily Cardiovascular Progress Note    Admit Date: 10/21/2024    Chief complaint: f/u mvr  HPI:     Pt intubated/sedated, hx not obtainable from pt.  Case d/w icu team, bedside rn       Medications/Labs all Reviewed:  Patient Active Problem List   Diagnosis    Fatty liver    Tachycardia    Hypertension    Anxiety    Bigeminy    Mitral regurgitation    Severe mitral regurgitation    Mitral valve disorder    Acute myocardial infarction of right ventricle (HCC)       Medications:  perflutren lipid microspheres (DEFINITY) injection 1.5 mL, ONCE PRN  protamine injection 50 mg, Once PRN  sodium chloride flush 0.9 % injection 5-40 mL, 2 times per day  sodium chloride flush 0.9 % injection 5-40 mL, PRN  0.9 % sodium chloride infusion, PRN  fondaparinux (ARIXTRA) injection 2.5 mg, Daily  ondansetron (ZOFRAN-ODT) disintegrating tablet 4 mg, Q8H PRN   Or  ondansetron (ZOFRAN) injection 4 mg, Q6H PRN  clopidogrel (PLAVIX) tablet 75 mg, Daily  acetaminophen (TYLENOL) tablet 1,000 mg, 4 times per day  oxyCODONE (ROXICODONE) immediate release tablet 5 mg, Q4H PRN   Or  oxyCODONE (ROXICODONE) immediate release tablet 10 mg, Q4H PRN  morphine (PF) injection 2 mg, Q2H PRN   Or  morphine sulfate (PF) injection 4 mg, Q2H PRN  meperidine (DEMEROL) injection 25 mg, Once PRN  chlorhexidine (PERIDEX) 0.12 % solution 15 mL, BID  furosemide (LASIX) injection 20 mg, Q6H  [START ON 10/24/2024] furosemide (LASIX) tablet 40 mg, BID  hydrALAZINE (APRESOLINE) injection 5 mg, Q5 Min PRN  metoprolol (LOPRESSOR) injection 2.5 mg, Q10 Min PRN  magnesium oxide (MAG-OX) tablet 400 mg, BID  mupirocin (BACTROBAN) 2 % ointment, BID  potassium chloride (KLOR-CON M) extended release tablet 10 mEq, TID WC  midazolam (VERSED) injection 1 mg, Q1H PRN  polyethylene glycol (GLYCOLAX) packet 17 g, Daily  bisacodyl (DULCOLAX) EC tablet 5 mg, Daily  [START ON 10/24/2024] magnesium hydroxide (MILK OF MAGNESIA) 400 MG/5ML suspension 30 mL,  chloride 0.9 % 50 mL nebulization solution, Continuous  propofol infusion, Continuous  aspirin chewable tablet 81 mg, Daily  milrinone (PRIMACOR) 20 mg in dextrose 5 % 100 mL infusion, Continuous  VASOpressin (VASOSTRICT) 20 units in sodium chloride 0.9% 100 mL infusion, Continuous  del nido cardioplegia, Once   And  del nido cardioplegia, Once   And  del nido cardioplegia, Once           PHYSICAL EXAM   BP 96/61   Pulse 91   Temp 100.4 °F (38 °C) (Bladder)   Resp 19   Ht 1.702 m (5' 7\")   Wt 67.4 kg (148 lb 9.4 oz)   SpO2 99%   BMI 23.27 kg/m²    Vitals:    10/22/24 0700 10/22/24 0800 10/22/24 0804 10/22/24 0830   BP: 104/64 96/61     Pulse: 90 86 86 91   Resp: 16 18 15 19   Temp:  100.4 °F (38 °C)     TempSrc:  Bladder     SpO2: 99% 100% 98% 99%   Weight:       Height:             Intake/Output Summary (Last 24 hours) at 10/22/2024 0910  Last data filed at 10/22/2024 0800  Gross per 24 hour   Intake 5411.14 ml   Output 2972 ml   Net 2439.14 ml     Wt Readings from Last 3 Encounters:   10/22/24 67.4 kg (148 lb 9.4 oz)   10/02/24 74.8 kg (165 lb)   09/24/24 74.8 kg (165 lb)         Gen: Patient intubated/sedated   Neck: borderline jvp, lying flat   Respiratory: ctab   Chest: normal without deformity  Cardiovascular:RRR, S1S2, 1/6 sm   Abdomen: Soft,  Extremities: cool, tc edema   Neurological/Psychiatric:  Unable to obtain, pt intubated  Skin:  cool       Labs:  CBC:   Recent Labs     10/21/24  1200 10/21/24  1203 10/22/24  0420 10/22/24  0538 10/22/24  0734   WBC 22.2*  --  7.7  --   --    HGB 10.9*   < > 8.3* 8.3* 8.6*   HCT 33.3*  --  25.1*  --   --    MCV 89.1  --  88.3  --   --      --  95*  --   --     < > = values in this interval not displayed.     BMP:   Recent Labs     10/21/24  1200 10/21/24  2307 10/22/24  0420     --  141   K 4.3 4.3 4.4   *  --  112*   CO2 21  --  20*   BUN 9  --  12   CREATININE 0.7*  --  0.8*     MG:    Recent Labs     10/21/24  1200 10/22/24  0420   MG

## 2024-10-22 NOTE — PLAN OF CARE
Problem: Discharge Planning  Goal: Discharge to home or other facility with appropriate resources  10/22/2024 1041 by Jana Acosta RN  Outcome: Progressing  10/21/2024 2045 by Alice Nick RN  Outcome: Progressing  Flowsheets (Taken 10/21/2024 1930)  Discharge to home or other facility with appropriate resources:   Identify barriers to discharge with patient and caregiver   Arrange for needed discharge resources and transportation as appropriate   Identify discharge learning needs (meds, wound care, etc)     Problem: Pain  Goal: Verbalizes/displays adequate comfort level or baseline comfort level  10/22/2024 1041 by Jana Acosta RN  Outcome: Progressing  10/21/2024 2045 by Alice Nick RN  Outcome: Progressing  Flowsheets (Taken 10/21/2024 2000)  Verbalizes/displays adequate comfort level or baseline comfort level:   Encourage patient to monitor pain and request assistance   Assess pain using appropriate pain scale   Administer analgesics based on type and severity of pain and evaluate response   Implement non-pharmacological measures as appropriate and evaluate response     Problem: Safety - Adult  Goal: Free from fall injury  10/22/2024 1041 by Jana Acosta RN  Outcome: Progressing  Flowsheets (Taken 10/22/2024 0311 by Alice Nick, RN)  Free From Fall Injury:   Instruct family/caregiver on patient safety   Based on caregiver fall risk screen, instruct family/caregiver to ask for assistance with transferring infant if caregiver noted to have fall risk factors  10/21/2024 2045 by Alice Nick RN  Outcome: Progressing     Problem: ABCDS Injury Assessment  Goal: Absence of physical injury  Outcome: Progressing

## 2024-10-23 ENCOUNTER — APPOINTMENT (OUTPATIENT)
Dept: GENERAL RADIOLOGY | Age: 49
DRG: 219 | End: 2024-10-23
Attending: STUDENT IN AN ORGANIZED HEALTH CARE EDUCATION/TRAINING PROGRAM
Payer: COMMERCIAL

## 2024-10-23 ENCOUNTER — APPOINTMENT (OUTPATIENT)
Age: 49
DRG: 219 | End: 2024-10-23
Attending: STUDENT IN AN ORGANIZED HEALTH CARE EDUCATION/TRAINING PROGRAM
Payer: COMMERCIAL

## 2024-10-23 VITALS
HEIGHT: 67 IN | WEIGHT: 155 LBS | BODY MASS INDEX: 24.33 KG/M2 | RESPIRATION RATE: 21 BRPM | HEART RATE: 92 BPM | OXYGEN SATURATION: 96 % | SYSTOLIC BLOOD PRESSURE: 110 MMHG | TEMPERATURE: 97.5 F | DIASTOLIC BLOOD PRESSURE: 62 MMHG

## 2024-10-23 PROBLEM — I47.21 TORSADES DE POINTES (HCC): Status: ACTIVE | Noted: 2024-10-23

## 2024-10-23 PROBLEM — I48.0 PAF (PAROXYSMAL ATRIAL FIBRILLATION) (HCC): Status: ACTIVE | Noted: 2024-10-23

## 2024-10-23 LAB
ALBUMIN SERPL-MCNC: 3.2 G/DL (ref 3.4–5)
ALBUMIN/GLOB SERPL: 2 {RATIO} (ref 1.1–2.2)
ALP SERPL-CCNC: 35 U/L (ref 40–129)
ALT SERPL-CCNC: 43 U/L (ref 10–40)
ANION GAP SERPL CALCULATED.3IONS-SCNC: 10 MMOL/L (ref 3–16)
ANION GAP SERPL CALCULATED.3IONS-SCNC: 13 MMOL/L (ref 3–16)
AST SERPL-CCNC: 118 U/L (ref 15–37)
BASE EXCESS BLDA CALC-SCNC: -1 MMOL/L (ref -3–3)
BASE EXCESS BLDA CALC-SCNC: -2 MMOL/L (ref -3–3)
BASE EXCESS BLDA CALC-SCNC: -7 MMOL/L (ref -3–3)
BASE EXCESS BLDA CALC-SCNC: -8 MMOL/L (ref -3–3)
BASE EXCESS BLDA CALC-SCNC: 0 MMOL/L (ref -3–3)
BASOPHILS # BLD: 0.1 K/UL (ref 0–0.2)
BASOPHILS NFR BLD: 0.3 %
BILIRUB DIRECT SERPL-MCNC: 0.4 MG/DL (ref 0–0.3)
BILIRUB INDIRECT SERPL-MCNC: 0.4 MG/DL (ref 0–1)
BILIRUB SERPL-MCNC: 0.8 MG/DL (ref 0–1)
BUN SERPL-MCNC: 11 MG/DL (ref 7–20)
BUN SERPL-MCNC: 12 MG/DL (ref 7–20)
CA-I BLD-SCNC: 1.16 MMOL/L (ref 1.12–1.32)
CA-I BLD-SCNC: 1.19 MMOL/L (ref 1.12–1.32)
CA-I BLD-SCNC: 1.22 MMOL/L (ref 1.12–1.32)
CA-I BLD-SCNC: 1.31 MMOL/L (ref 1.12–1.32)
CA-I BLD-SCNC: 1.46 MMOL/L (ref 1.12–1.32)
CA-I BLD-SCNC: 1.47 MMOL/L (ref 1.12–1.32)
CALCIUM SERPL-MCNC: 7.8 MG/DL (ref 8.3–10.6)
CALCIUM SERPL-MCNC: 9.4 MG/DL (ref 8.3–10.6)
CHLORIDE SERPL-SCNC: 110 MMOL/L (ref 99–110)
CHLORIDE SERPL-SCNC: 112 MMOL/L (ref 99–110)
CO2 BLDA-SCNC: 20 MMOL/L
CO2 BLDA-SCNC: 21 MMOL/L
CO2 BLDA-SCNC: 24 MMOL/L
CO2 BLDA-SCNC: 24 MMOL/L
CO2 BLDA-SCNC: 25 MMOL/L
CO2 SERPL-SCNC: 19 MMOL/L (ref 21–32)
CO2 SERPL-SCNC: 22 MMOL/L (ref 21–32)
CREAT SERPL-MCNC: 0.7 MG/DL (ref 0.9–1.3)
CREAT SERPL-MCNC: 0.8 MG/DL (ref 0.9–1.3)
DEPRECATED RDW RBC AUTO: 13.4 % (ref 12.4–15.4)
DEPRECATED RDW RBC AUTO: 13.6 % (ref 12.4–15.4)
ECHO BSA: 1.82 M2
ECHO BSA: 1.82 M2
ECHO LV EDV A2C: 90 ML
ECHO LV EDV A4C: 78 ML
ECHO LV EDV INDEX A4C: 43 ML/M2
ECHO LV EDV NDEX A2C: 50 ML/M2
ECHO LV EJECTION FRACTION A2C: 34 %
ECHO LV EJECTION FRACTION A4C: 49 %
ECHO LV EJECTION FRACTION BIPLANE: 46 % (ref 55–100)
ECHO LV ESV A2C: 60 ML
ECHO LV ESV A4C: 40 ML
ECHO LV ESV INDEX A2C: 33 ML/M2
ECHO LV ESV INDEX A4C: 22 ML/M2
ECHO RV FREE WALL PEAK S': 2.8 CM/S
ECHO RV INTERNAL DIMENSION: 4.2 CM
ECHO RV TAPSE: 0.2 CM (ref 1.7–?)
ECHO RV TAPSE: 0.8 CM (ref 1.7–?)
EKG ATRIAL RATE: 83 BPM
EKG DIAGNOSIS: NORMAL
EKG P-R INTERVAL: 80 MS
EKG Q-T INTERVAL: 298 MS
EKG Q-T INTERVAL: 364 MS
EKG Q-T INTERVAL: 408 MS
EKG Q-T INTERVAL: 420 MS
EKG Q-T INTERVAL: 446 MS
EKG QRS DURATION: 126 MS
EKG QRS DURATION: 76 MS
EKG QRS DURATION: 78 MS
EKG QRS DURATION: 82 MS
EKG QRS DURATION: 92 MS
EKG QTC CALCULATION (BAZETT): 376 MS
EKG QTC CALCULATION (BAZETT): 427 MS
EKG QTC CALCULATION (BAZETT): 443 MS
EKG QTC CALCULATION (BAZETT): 450 MS
EKG QTC CALCULATION (BAZETT): 524 MS
EKG R AXIS: 46 DEGREES
EKG R AXIS: 48 DEGREES
EKG R AXIS: 49 DEGREES
EKG R AXIS: 66 DEGREES
EKG R AXIS: 67 DEGREES
EKG T AXIS: 258 DEGREES
EKG T AXIS: 40 DEGREES
EKG T AXIS: 41 DEGREES
EKG T AXIS: 76 DEGREES
EKG T AXIS: 89 DEGREES
EKG VENTRICULAR RATE: 69 BPM
EKG VENTRICULAR RATE: 71 BPM
EKG VENTRICULAR RATE: 83 BPM
EKG VENTRICULAR RATE: 83 BPM
EKG VENTRICULAR RATE: 96 BPM
EOSINOPHIL # BLD: 0.3 K/UL (ref 0–0.6)
EOSINOPHIL NFR BLD: 1.4 %
FIBRINOGEN PPP-MCNC: 543 MG/DL (ref 227–534)
GFR SERPLBLD CREATININE-BSD FMLA CKD-EPI: >90 ML/MIN/{1.73_M2}
GFR SERPLBLD CREATININE-BSD FMLA CKD-EPI: >90 ML/MIN/{1.73_M2}
GLUCOSE BLD-MCNC: 101 MG/DL (ref 70–99)
GLUCOSE BLD-MCNC: 101 MG/DL (ref 70–99)
GLUCOSE BLD-MCNC: 103 MG/DL (ref 70–99)
GLUCOSE BLD-MCNC: 107 MG/DL (ref 70–99)
GLUCOSE BLD-MCNC: 109 MG/DL (ref 70–99)
GLUCOSE BLD-MCNC: 109 MG/DL (ref 70–99)
GLUCOSE BLD-MCNC: 127 MG/DL (ref 70–99)
GLUCOSE BLD-MCNC: 147 MG/DL (ref 70–99)
GLUCOSE BLD-MCNC: 150 MG/DL (ref 70–99)
GLUCOSE BLD-MCNC: 86 MG/DL (ref 70–99)
GLUCOSE BLD-MCNC: 98 MG/DL (ref 70–99)
GLUCOSE SERPL-MCNC: 174 MG/DL (ref 70–99)
GLUCOSE SERPL-MCNC: 97 MG/DL (ref 70–99)
HCO3 BLDA-SCNC: 18.4 MMOL/L (ref 21–29)
HCO3 BLDA-SCNC: 19.5 MMOL/L (ref 21–29)
HCO3 BLDA-SCNC: 22.8 MMOL/L (ref 21–29)
HCO3 BLDA-SCNC: 22.8 MMOL/L (ref 21–29)
HCO3 BLDA-SCNC: 23.5 MMOL/L (ref 21–29)
HCT VFR BLD AUTO: 26.2 % (ref 40.5–52.5)
HCT VFR BLD AUTO: 27 % (ref 40.5–52.5)
HCT VFR BLD AUTO: 27 % (ref 40.5–52.5)
HCT VFR BLD AUTO: 28 % (ref 40.5–52.5)
HCT VFR BLD AUTO: 29 % (ref 40.5–52.5)
HCT VFR BLD AUTO: 30 % (ref 40.5–52.5)
HGB BLD CALC-MCNC: 10.2 GM/DL (ref 13.5–17.5)
HGB BLD CALC-MCNC: 9.3 GM/DL (ref 13.5–17.5)
HGB BLD CALC-MCNC: 9.3 GM/DL (ref 13.5–17.5)
HGB BLD CALC-MCNC: 9.9 GM/DL (ref 13.5–17.5)
HGB BLD-MCNC: 8.6 G/DL (ref 13.5–17.5)
HGB BLD-MCNC: 9.1 G/DL (ref 13.5–17.5)
INR PPP: 1.4 (ref 0.85–1.15)
LACTATE BLD-SCNC: 1.52 MMOL/L (ref 0.4–2)
LACTATE BLD-SCNC: 1.62 MMOL/L (ref 0.4–2)
LACTATE BLD-SCNC: 4.72 MMOL/L (ref 0.4–2)
LACTATE BLD-SCNC: 4.92 MMOL/L (ref 0.4–2)
LACTATE BLDV-SCNC: 4.8 MMOL/L (ref 0.4–2)
LYMPHOCYTES # BLD: 3.9 K/UL (ref 1–5.1)
LYMPHOCYTES NFR BLD: 19 %
MAGNESIUM SERPL-MCNC: 1.97 MG/DL (ref 1.8–2.4)
MAGNESIUM SERPL-MCNC: 2.12 MG/DL (ref 1.8–2.4)
MCH RBC QN AUTO: 29 PG (ref 26–34)
MCH RBC QN AUTO: 29.2 PG (ref 26–34)
MCHC RBC AUTO-ENTMCNC: 32.4 G/DL (ref 31–36)
MCHC RBC AUTO-ENTMCNC: 32.7 G/DL (ref 31–36)
MCV RBC AUTO: 88.6 FL (ref 80–100)
MCV RBC AUTO: 90.2 FL (ref 80–100)
MONOCYTES # BLD: 1.4 K/UL (ref 0–1.3)
MONOCYTES NFR BLD: 7 %
NEUTROPHILS # BLD: 14.9 K/UL (ref 1.7–7.7)
NEUTROPHILS NFR BLD: 72.3 %
PCO2 BLDA: 31.6 MM HG (ref 35–45)
PCO2 BLDA: 31.9 MM HG (ref 35–45)
PCO2 BLDA: 34.9 MM HG (ref 35–45)
PCO2 BLDA: 37.4 MM HG (ref 35–45)
PCO2 BLDA: 38 MM HG (ref 35–45)
PERFORMED ON: ABNORMAL
PERFORMED ON: NORMAL
PH BLDA: 7.3 [PH] (ref 7.35–7.45)
PH BLDA: 7.32 [PH] (ref 7.35–7.45)
PH BLDA: 7.42 [PH] (ref 7.35–7.45)
PH BLDA: 7.46 [PH] (ref 7.35–7.45)
PH BLDA: 7.48 [PH] (ref 7.35–7.45)
PH BLDV: 7.29 [PH] (ref 7.35–7.45)
PLATELET # BLD AUTO: 101 K/UL (ref 135–450)
PLATELET # BLD AUTO: 125 K/UL (ref 135–450)
PMV BLD AUTO: 8.1 FL (ref 5–10.5)
PMV BLD AUTO: 8.1 FL (ref 5–10.5)
PO2 BLDA: 63.4 MM HG (ref 75–108)
PO2 BLDA: 66.9 MM HG (ref 75–108)
PO2 BLDA: 80.1 MM HG (ref 75–108)
PO2 BLDA: 83.7 MM HG (ref 75–108)
PO2 BLDA: 90.4 MM HG (ref 75–108)
POC SAMPLE TYPE: ABNORMAL
POTASSIUM BLD-SCNC: 3.8 MMOL/L (ref 3.5–5.1)
POTASSIUM BLD-SCNC: 4 MMOL/L (ref 3.5–5.1)
POTASSIUM BLD-SCNC: 4.1 MMOL/L (ref 3.5–5.1)
POTASSIUM BLD-SCNC: 4.1 MMOL/L (ref 3.5–5.1)
POTASSIUM BLD-SCNC: 4.2 MMOL/L (ref 3.5–5.1)
POTASSIUM SERPL-SCNC: 3.8 MMOL/L (ref 3.5–5.1)
POTASSIUM SERPL-SCNC: 3.8 MMOL/L (ref 3.5–5.1)
PROT SERPL-MCNC: 4.8 G/DL (ref 6.4–8.2)
PROTHROMBIN TIME: 17.3 SEC (ref 11.9–14.9)
RBC # BLD AUTO: 2.95 M/UL (ref 4.2–5.9)
RBC # BLD AUTO: 3.1 M/UL (ref 4.2–5.9)
SAO2 % BLDA: 90 % (ref 93–100)
SAO2 % BLDA: 91 % (ref 93–100)
SAO2 % BLDA: 97 % (ref 93–100)
SAO2 % BLDA: 97 % (ref 93–100)
SAO2 % BLDA: 98 % (ref 93–100)
SODIUM BLD-SCNC: 141 MMOL/L (ref 136–145)
SODIUM BLD-SCNC: 142 MMOL/L (ref 136–145)
SODIUM BLD-SCNC: 143 MMOL/L (ref 136–145)
SODIUM BLD-SCNC: 144 MMOL/L (ref 136–145)
SODIUM SERPL-SCNC: 142 MMOL/L (ref 136–145)
SODIUM SERPL-SCNC: 144 MMOL/L (ref 136–145)
WBC # BLD AUTO: 11.9 K/UL (ref 4–11)
WBC # BLD AUTO: 20.7 K/UL (ref 4–11)

## 2024-10-23 PROCEDURE — 94640 AIRWAY INHALATION TREATMENT: CPT

## 2024-10-23 PROCEDURE — 85610 PROTHROMBIN TIME: CPT

## 2024-10-23 PROCEDURE — 5A12012 PERFORMANCE OF CARDIAC OUTPUT, SINGLE, MANUAL: ICD-10-PCS | Performed by: STUDENT IN AN ORGANIZED HEALTH CARE EDUCATION/TRAINING PROGRAM

## 2024-10-23 PROCEDURE — 2500000003 HC RX 250 WO HCPCS: Performed by: STUDENT IN AN ORGANIZED HEALTH CARE EDUCATION/TRAINING PROGRAM

## 2024-10-23 PROCEDURE — 6360000002 HC RX W HCPCS: Performed by: NURSE PRACTITIONER

## 2024-10-23 PROCEDURE — 93010 ELECTROCARDIOGRAM REPORT: CPT | Performed by: INTERNAL MEDICINE

## 2024-10-23 PROCEDURE — 85384 FIBRINOGEN ACTIVITY: CPT

## 2024-10-23 PROCEDURE — 6360000002 HC RX W HCPCS: Performed by: STUDENT IN AN ORGANIZED HEALTH CARE EDUCATION/TRAINING PROGRAM

## 2024-10-23 PROCEDURE — 85014 HEMATOCRIT: CPT

## 2024-10-23 PROCEDURE — 82803 BLOOD GASES ANY COMBINATION: CPT

## 2024-10-23 PROCEDURE — 83735 ASSAY OF MAGNESIUM: CPT

## 2024-10-23 PROCEDURE — 31500 INSERT EMERGENCY AIRWAY: CPT | Performed by: NURSE PRACTITIONER

## 2024-10-23 PROCEDURE — 84295 ASSAY OF SERUM SODIUM: CPT

## 2024-10-23 PROCEDURE — 6370000000 HC RX 637 (ALT 250 FOR IP)

## 2024-10-23 PROCEDURE — 80053 COMPREHEN METABOLIC PANEL: CPT

## 2024-10-23 PROCEDURE — 6360000002 HC RX W HCPCS

## 2024-10-23 PROCEDURE — 94761 N-INVAS EAR/PLS OXIMETRY MLT: CPT

## 2024-10-23 PROCEDURE — 2580000003 HC RX 258: Performed by: STUDENT IN AN ORGANIZED HEALTH CARE EDUCATION/TRAINING PROGRAM

## 2024-10-23 PROCEDURE — 99291 CRITICAL CARE FIRST HOUR: CPT | Performed by: INTERNAL MEDICINE

## 2024-10-23 PROCEDURE — 71045 X-RAY EXAM CHEST 1 VIEW: CPT

## 2024-10-23 PROCEDURE — 6370000000 HC RX 637 (ALT 250 FOR IP): Performed by: STUDENT IN AN ORGANIZED HEALTH CARE EDUCATION/TRAINING PROGRAM

## 2024-10-23 PROCEDURE — 83605 ASSAY OF LACTIC ACID: CPT

## 2024-10-23 PROCEDURE — 84132 ASSAY OF SERUM POTASSIUM: CPT

## 2024-10-23 PROCEDURE — 93320 DOPPLER ECHO COMPLETE: CPT | Performed by: INTERNAL MEDICINE

## 2024-10-23 PROCEDURE — 85027 COMPLETE CBC AUTOMATED: CPT

## 2024-10-23 PROCEDURE — 93325 DOPPLER ECHO COLOR FLOW MAPG: CPT | Performed by: INTERNAL MEDICINE

## 2024-10-23 PROCEDURE — 82330 ASSAY OF CALCIUM: CPT

## 2024-10-23 PROCEDURE — 93308 TTE F-UP OR LMTD: CPT | Performed by: INTERNAL MEDICINE

## 2024-10-23 PROCEDURE — 31500 INSERT EMERGENCY AIRWAY: CPT

## 2024-10-23 PROCEDURE — 82248 BILIRUBIN DIRECT: CPT

## 2024-10-23 PROCEDURE — 93312 ECHO TRANSESOPHAGEAL: CPT | Performed by: INTERNAL MEDICINE

## 2024-10-23 PROCEDURE — 2700000000 HC OXYGEN THERAPY PER DAY

## 2024-10-23 PROCEDURE — C8925 2D TEE W OR W/O FOL W/CON,IN: HCPCS

## 2024-10-23 PROCEDURE — 82947 ASSAY GLUCOSE BLOOD QUANT: CPT

## 2024-10-23 PROCEDURE — 94003 VENT MGMT INPAT SUBQ DAY: CPT

## 2024-10-23 PROCEDURE — 99024 POSTOP FOLLOW-UP VISIT: CPT

## 2024-10-23 PROCEDURE — 99291 CRITICAL CARE FIRST HOUR: CPT | Performed by: NURSE PRACTITIONER

## 2024-10-23 PROCEDURE — C8924 2D TTE W OR W/O FOL W/CON,FU: HCPCS

## 2024-10-23 PROCEDURE — 85025 COMPLETE CBC W/AUTO DIFF WBC: CPT

## 2024-10-23 PROCEDURE — 6360000004 HC RX CONTRAST MEDICATION

## 2024-10-23 PROCEDURE — 93005 ELECTROCARDIOGRAM TRACING: CPT | Performed by: INTERNAL MEDICINE

## 2024-10-23 PROCEDURE — 2500000003 HC RX 250 WO HCPCS

## 2024-10-23 PROCEDURE — 93005 ELECTROCARDIOGRAM TRACING: CPT | Performed by: STUDENT IN AN ORGANIZED HEALTH CARE EDUCATION/TRAINING PROGRAM

## 2024-10-23 RX ORDER — FUROSEMIDE 10 MG/ML
20 INJECTION INTRAMUSCULAR; INTRAVENOUS ONCE
Status: COMPLETED | OUTPATIENT
Start: 2024-10-23 | End: 2024-10-23

## 2024-10-23 RX ORDER — MAGNESIUM SULFATE IN WATER 40 MG/ML
2000 INJECTION, SOLUTION INTRAVENOUS ONCE
Status: COMPLETED | OUTPATIENT
Start: 2024-10-23 | End: 2024-10-23

## 2024-10-23 RX ORDER — NYSTATIN 100000 [USP'U]/ML
5 SUSPENSION ORAL 4 TIMES DAILY
Status: DISCONTINUED | OUTPATIENT
Start: 2024-10-23 | End: 2024-10-23 | Stop reason: HOSPADM

## 2024-10-23 RX ORDER — EPINEPHRINE IN SOD CHLOR,ISO 1 MG/10 ML
0.5 SYRINGE (ML) INTRAVENOUS ONCE
Status: COMPLETED | OUTPATIENT
Start: 2024-10-23 | End: 2024-10-23

## 2024-10-23 RX ORDER — MIDAZOLAM HYDROCHLORIDE 1 MG/ML
INJECTION, SOLUTION INTRAMUSCULAR; INTRAVENOUS
Status: COMPLETED
Start: 2024-10-23 | End: 2024-10-23

## 2024-10-23 RX ORDER — MIDAZOLAM HYDROCHLORIDE 1 MG/ML
2 INJECTION, SOLUTION INTRAMUSCULAR; INTRAVENOUS ONCE
Status: COMPLETED | OUTPATIENT
Start: 2024-10-23 | End: 2024-10-23

## 2024-10-23 RX ADMIN — CHLORHEXIDINE GLUCONATE 15 ML: 1.2 RINSE ORAL at 09:12

## 2024-10-23 RX ADMIN — ACETAMINOPHEN 1000 MG: 500 TABLET ORAL at 05:35

## 2024-10-23 RX ADMIN — POTASSIUM CHLORIDE 20 MEQ: 29.8 INJECTION, SOLUTION INTRAVENOUS at 13:29

## 2024-10-23 RX ADMIN — POTASSIUM CHLORIDE 20 MEQ: 29.8 INJECTION, SOLUTION INTRAVENOUS at 07:43

## 2024-10-23 RX ADMIN — Medication 1.2 MCG/KG/HR: at 05:36

## 2024-10-23 RX ADMIN — ALBUTEROL SULFATE 2.5 MG: 2.5 SOLUTION RESPIRATORY (INHALATION) at 08:30

## 2024-10-23 RX ADMIN — DEXTROSE MONOHYDRATE 150 MG: 50 INJECTION, SOLUTION INTRAVENOUS at 05:15

## 2024-10-23 RX ADMIN — AMIODARONE HYDROCHLORIDE 1 MG/MIN: 50 INJECTION, SOLUTION INTRAVENOUS at 05:29

## 2024-10-23 RX ADMIN — POLYETHYLENE GLYCOL 3350 17 G: 17 POWDER, FOR SOLUTION ORAL at 08:29

## 2024-10-23 RX ADMIN — OXYCODONE 5 MG: 5 TABLET ORAL at 01:02

## 2024-10-23 RX ADMIN — ASPIRIN 81 MG 81 MG: 81 TABLET ORAL at 08:29

## 2024-10-23 RX ADMIN — EPINEPHRINE 0.5 MG: 0.1 INJECTION, SOLUTION ENDOTRACHEAL; INTRACARDIAC; INTRAVENOUS at 12:53

## 2024-10-23 RX ADMIN — PROPOFOL 20 MCG/KG/MIN: 10 INJECTION, EMULSION INTRAVENOUS at 04:12

## 2024-10-23 RX ADMIN — PERFLUTREN 1.5 ML: 6.52 INJECTION, SUSPENSION INTRAVENOUS at 08:46

## 2024-10-23 RX ADMIN — SODIUM BICARBONATE 50 MEQ: 84 INJECTION INTRAVENOUS at 13:48

## 2024-10-23 RX ADMIN — MIDAZOLAM 2 MG: 1 INJECTION INTRAMUSCULAR; INTRAVENOUS at 12:54

## 2024-10-23 RX ADMIN — MUPIROCIN: 20 OINTMENT TOPICAL at 08:30

## 2024-10-23 RX ADMIN — FUROSEMIDE 20 MG: 10 INJECTION, SOLUTION INTRAMUSCULAR; INTRAVENOUS at 08:24

## 2024-10-23 RX ADMIN — AMIODARONE HYDROCHLORIDE 1 MG/MIN: 1.8 INJECTION, SOLUTION INTRAVENOUS at 12:49

## 2024-10-23 RX ADMIN — MAGNESIUM SULFATE HEPTAHYDRATE 2000 MG: 40 INJECTION, SOLUTION INTRAVENOUS at 11:32

## 2024-10-23 RX ADMIN — OXYCODONE 10 MG: 5 TABLET ORAL at 08:37

## 2024-10-23 RX ADMIN — AMIODARONE HYDROCHLORIDE 150 MG: 1.5 INJECTION, SOLUTION INTRAVENOUS at 12:33

## 2024-10-23 RX ADMIN — POTASSIUM CHLORIDE 10 MEQ: 750 TABLET, EXTENDED RELEASE ORAL at 08:29

## 2024-10-23 RX ADMIN — POTASSIUM CHLORIDE 20 MEQ: 29.8 INJECTION, SOLUTION INTRAVENOUS at 12:23

## 2024-10-23 RX ADMIN — Medication 400 MG: at 08:29

## 2024-10-23 RX ADMIN — POTASSIUM CHLORIDE 20 MEQ: 29.8 INJECTION, SOLUTION INTRAVENOUS at 06:38

## 2024-10-23 RX ADMIN — SODIUM BICARBONATE 100 MEQ: 84 INJECTION INTRAVENOUS at 12:29

## 2024-10-23 RX ADMIN — MIDAZOLAM 2 MG: 1 INJECTION INTRAMUSCULAR; INTRAVENOUS at 12:30

## 2024-10-23 RX ADMIN — MORPHINE SULFATE 4 MG: 4 INJECTION, SOLUTION INTRAMUSCULAR; INTRAVENOUS at 00:19

## 2024-10-23 RX ADMIN — MILRINONE LACTATE 0.38 MCG/KG/MIN: 0.2 INJECTION, SOLUTION INTRAVENOUS at 00:48

## 2024-10-23 RX ADMIN — MILRINONE LACTATE 0.38 MCG/KG/MIN: 0.2 INJECTION, SOLUTION INTRAVENOUS at 14:06

## 2024-10-23 RX ADMIN — Medication 10 ML: at 08:34

## 2024-10-23 RX ADMIN — FONDAPARINUX SODIUM 2.5 MG: 2.5 INJECTION, SOLUTION SUBCUTANEOUS at 08:29

## 2024-10-23 RX ADMIN — FAMOTIDINE 20 MG: 10 INJECTION, SOLUTION INTRAVENOUS at 08:29

## 2024-10-23 ASSESSMENT — PULMONARY FUNCTION TESTS
PIF_VALUE: 29
PIF_VALUE: 37
PIF_VALUE: 24
PIF_VALUE: 34
PIF_VALUE: 19
PIF_VALUE: 24
PIF_VALUE: 24
PIF_VALUE: 18
PIF_VALUE: 25
PIF_VALUE: 34
PIF_VALUE: 26
PIF_VALUE: 24
PIF_VALUE: 24
PIF_VALUE: 36
PIF_VALUE: 21
PIF_VALUE: 34
PIF_VALUE: 18
PIF_VALUE: 25
PIF_VALUE: 34
PIF_VALUE: 25
PIF_VALUE: 37
PIF_VALUE: 45
PIF_VALUE: 32
PIF_VALUE: 34
PIF_VALUE: 24
PIF_VALUE: 25
PIF_VALUE: 24
PIF_VALUE: 28
PIF_VALUE: 18

## 2024-10-23 ASSESSMENT — PAIN SCALES - GENERAL
PAINLEVEL_OUTOF10: 10
PAINLEVEL_OUTOF10: 0
PAINLEVEL_OUTOF10: 0

## 2024-10-23 NOTE — PROGRESS NOTES
10/23/24 0830   Patient Observation   Pulse 83   Respirations 14   SpO2 93 %   Breath Sounds   Breath Sounds Bilateral Diminished   Vent Information   Vent Mode AC/PRVC   Ventilator Settings   Vt (Set, mL) 500 mL   Resp Rate (Set) 14 bpm   PEEP/CPAP (cmH2O) 5   FiO2  25 %   Vent Patient Data (Readings)   Vt (Measured) 510 mL   Peak Inspiratory Pressure (cmH2O) 26 cmH2O   Rate Measured 14 br/min   Minute Volume (L/min) 7.18 Liters   Mean Airway Pressure (cmH2O) 9.7 cmH20   Plateau Pressure (cm H2O) 0 cm H2O   Driving Pressure -5   Inspiratory Time 1 sec   I:E Ratio 1:3.30   I Time/ I Time % 1 s   Backup Apnea On   Backup Rate 14 Breaths Per Minute   Backup Vt 500   Vent Alarm Settings   High Pressure (cmH2O) 45 cmH2O   Low Minute Volume (lpm) 2 L/min   High Minute Volume (lpm) 20 L/min   Low Exhaled Vt (ml) 200 mL   High Exhaled Vt (ml) 1000 mL   RR High (bpm) 40 br/min   Apnea (secs) 20 secs   Additional Respiratoray Assessments   Humidification Source Heated wire   Humidification Temp 36.4   Circuit Condensation Drained   Ambu Bag With Mask At Bedside Yes   Airway Clearance   Suction   (pt getting bedside echo)   ETT    Placement Date/Time: 10/21/24 0715   Present on Admission/Arrival: No  Placed By: In surgery  Placement Verified By: Auscultation;Capnometry  Preoxygenation: Yes  Mask Ventilation: Ventilated by mask (1)  Technique: Direct laryngoscopy  Airway Type: C...   Secured At 23 cm   Measured From Lips   ETT Placement Right   Secured By Commercial tube king   Site Assessment Dry   Cuff Pressure 30 cm H2O   Tie/King Changed No

## 2024-10-23 NOTE — PROGRESS NOTES
10/23/24 1147   Patient Observation   Pulse 90   Respirations 15   SpO2 91 %   Vent Information   Ventilator Initiate Yes   Vent Mode AC/PRVC   Ventilator Settings   Vt (Set, mL) 500 mL   Resp Rate (Set) 14 bpm   PEEP/CPAP (cmH2O) (S)  15   FiO2  100 %   Vent Patient Data (Readings)   Vt (Measured) 512 mL   Peak Inspiratory Pressure (cmH2O) 34 cmH2O   Rate Measured 14 br/min   Minute Volume (L/min) 7.02 Liters   Mean Airway Pressure (cmH2O) 19 cmH20   Plateau Pressure (cm H2O) 0 cm H2O   Driving Pressure -15   I:E Ratio 1:2.90   I Time/ I Time % 1 s   Backup Apnea On   Backup Rate 14 Breaths Per Minute   Backup Vt 500   Vent Alarm Settings   High Pressure (cmH2O) 45 cmH2O   Low Minute Volume (lpm) 2 L/min   High Minute Volume (lpm) 20 L/min   Low Exhaled Vt (ml) 200 mL   High Exhaled Vt (ml) 1000 mL   RR High (bpm) 40 br/min   Apnea (secs) 20 secs   Additional Respiratoray Assessments   Humidification Source Heated wire   Humidification Temp 36.9   Circuit Condensation Drained   Ambu Bag With Mask At Bedside Yes   ETT    Placement Date/Time: 10/21/24 0715   Present on Admission/Arrival: No  Placed By: In surgery  Placement Verified By: Auscultation;Capnometry  Preoxygenation: Yes  Mask Ventilation: Ventilated by mask (1)  Technique: Direct laryngoscopy  Airway Type: C...   Secured At 23 cm   Measured From Lips   ETT Placement Right   Secured By Commercial tube king   Site Assessment Dry   Cuff Pressure 30 cm H2O   Tie/King Changed No

## 2024-10-23 NOTE — PROGRESS NOTES
10/23/24 1217   Encounter Summary   Encounter Overview/Reason Crisis   Service Provided For Family;Patient not available   Referral/Consult From Other (comment)  (Response to Code Blue)   Last Encounter  10/23/24  (Response to Code Blue. Comforted family in waiting room with empathetic, active listening and conversation. Left with prayer card and Spiritual Health contact card/TRAVON)   Complexity of Encounter High   Begin Time 1125   End Time  1222   Total Time Calculated 57 min   Crisis   Type Code Blue   Spiritual/Emotional needs   Type Emotional Distress;Spiritual Support   Assessment/Intervention/Outcome   Assessment Anxious;Concerns with suffering;Coping;Powerlessness;Shock;Stress overload   Intervention Active listening;Discussed belief system/Restorationism practices/ammon;Discussed illness injury and it’s impact;Discussed relationship with God;Explored/Affirmed feelings, thoughts, concerns;Explored Coping Skills/Resources;Life review/Legacy;Sustaining Presence/Ministry of presence   Outcome Comfort;Connection/Belonging;Coping;Encouraged;Engaged in conversation;Expressed feelings, needs, and concerns;Expressed Gratitude;Less anxious, Less agitated      Vale Silva EdD, JI BERNARD ([facility])    Thank you for consulting Spiritual Health    If you would like a 's presence for emotional, spiritual, grief or comfort care,   please dial \"[phone]\" and ask for the  on-call to be paged.    For help with Advanced Care Planning, Power of  for Healthcare or Living Will forms, you may also call us directly:    [phone]) Frantz  [phone]) [place]  [phone]) Outpatient    Formerly [facility], later [facility]

## 2024-10-23 NOTE — PROGRESS NOTES
Occupational / Physical Therapy    Pt chart reviewed and RN contacted. RN reported pt still actively on x2 pressor drips for hypotension. RN also reported plan to extubate pt today. Will follow up w/ pt following extubation if medically appropriate. Thank you.    Addendum:  Code blue called on pt this AM during SBT w/ plan to re-intubate. Will sign off at this time. Please re-order when medically appropriate.    David Valdivia, OTR/GRADY Davies, PT, DPT

## 2024-10-23 NOTE — PROGRESS NOTES
Patient awake and following commands.  Patient placed on CPAP per open heart protocol.  ABG drawn  Respiratory called for weaning parameters.  Patient suctioned and extubated to 4 liters nasal cannula OG tube discontinued.   Patient tolerated well.  Oxygen saturation 98 on 4 liters nasal cannula.     1120: pt A&O talking with RN and NP. PT went into V-FIB HR increased to 300, pt became unresponsive. Code called

## 2024-10-23 NOTE — SIGNIFICANT EVENT
Shortly after extubation he developed torsades which progressed into ventricular fibrillation.  ACLS protocol was started and he was given a unsynchronized defibrillation at the same time CPR was administered.  He regained ROSC 3 minutes after the initial event.  He was intubated.  He had difficulty ventilating due to bronchospasm.  We started intravenous epinephrine which helped open him up.  Eventually we settled on the lung I to E time which allowed him to oxygenate well.  X-ray was unremarkable.  At this time a surface echo was performed but the windows were poor so Dr. Penn came in and did a transesophageal echocardiogram.     The HENRIETTA showed LVEF about 45% and a significantly reduced right ventricle with a TAPSE of about 2.  There was no effusion or evidence of tamponade.  During this he developed A-fib which compromises cardiac output, taking it from a index of about 2.8 to about 1.7 with significant drop in blood pressure.  After amiodarone we have been able to maintain a sinus rhythm with acceptable hemodynamics.  His lactate plateaued immediately after the code event and now is down to 4.5 the rest of his gas it is okay.  He continues to make urine.    We discussed this with our interventional cardiologist whether or not he needed VA ECMO, and Impella RP, a Protek duo or continued observation.  I placed a call out to the [facility] for guidance and they recommended transfer to a tertiary care for continued cardiac critical care.  I have spoken with the family about the rationale for transfer and they are agreeable.

## 2024-10-23 NOTE — PROGRESS NOTES
2 % ointment, BID  polyethylene glycol (GLYCOLAX) packet 17 g, Daily  bisacodyl (DULCOLAX) EC tablet 5 mg, Daily  [START ON 10/24/2024] magnesium hydroxide (MILK OF MAGNESIA) 400 MG/5ML suspension 30 mL, Daily PRN  bisacodyl (DULCOLAX) suppository 10 mg, Daily PRN  [Held by provider] metoprolol tartrate (LOPRESSOR) tablet 12.5 mg, BID  atorvastatin (LIPITOR) tablet 40 mg, Nightly  famotidine (PEPCID) tablet 20 mg, BID   Or  famotidine (PEPCID) 20 mg in sodium chloride (PF) 0.9 % 10 mL injection, BID  potassium chloride 20 mEq/50 mL IVPB (Central Line), PRN  magnesium sulfate 2000 mg in 50 mL IVPB premix, PRN  albuterol (PROVENTIL) (2.5 MG/3ML) 0.083% nebulizer solution 2.5 mg, Q4H WA RT  norepinephrine (LEVOPHED) 16 mg in sodium chloride 0.9 % 250 mL infusion, Continuous PRN  niCARdipine (CARDENE) 50 mg in sodium chloride 0.9 % 100 mL infusion, Continuous PRN  insulin glargine (LANTUS) injection vial 11 Units, Nightly  [START ON 10/24/2024] insulin lispro (HUMALOG,ADMELOG) injection vial 0-12 Units, TID WC  [START ON 10/24/2024] insulin lispro (HUMALOG,ADMELOG) injection vial 0-6 Units, Nightly  glucose chewable tablet 16 g, PRN  dextrose bolus 10% 125 mL, PRN   Or  dextrose bolus 10% 250 mL, PRN  glucagon injection 1 mg, PRN  dextrose 10 % infusion, Continuous PRN  DOBUTamine (DOBUTREX) 500 mg in dextrose 5 % 250 mL infusion, Continuous  0.9 % sodium chloride infusion, PRN  dexmedeTOMIDine (PRECEDEX) 400 mcg in sodium chloride 0.9 % 100 mL infusion, Continuous  EPINEPHrine 5 mg in sodium chloride 0.9 % 250 ml infusion, Continuous  propofol infusion, Continuous  aspirin chewable tablet 81 mg, Daily  milrinone (PRIMACOR) 20 mg in dextrose 5 % 100 mL infusion, Continuous  VASOpressin (VASOSTRICT) 20 units in sodium chloride 0.9% 100 mL infusion, Continuous           PHYSICAL EXAM   /62   Pulse 92   Temp 97.5 °F (36.4 °C) (Bladder)   Resp 21   Ht 1.702 m (5' 7\")   Wt 70.3 kg (155 lb)   SpO2 96%   BMI 24.28  rhythm    Echo:        Image quality is technically difficult.    Left Ventricle: Mildly reduced left ventricular systolic function with a visually estimated EF of 40 - 45%. Left ventricle size is normal. Septal flattening in diastole consistent with right ventricular volume overload.    Right Ventricle: Right ventricle is moderately dilated. Severely reduced systolic function. TAPSE is 5.0 cm. RV Peak S' is 3.2 cm/s.    Aortic Valve: Valve structure is normal. Trace regurgitation.    Mitral Valve: Valve repaired by annular ring. No regurgitation. Normal prosthetic gradient though mitral inflow doppler appears sub-optimal for assessment.    Pericardium: Small (<1 cm) localized pericardial effusion present around the left ventricle. No indication of cardiac tamponade.    F/u echo this am similar to prior, HENRIETTA images reviewed, RV dysfunction noted, LV low normal to mildly reduced function    I have reviewed labs and imaging/xray/diagnostic testing in this note.    Assessment and Plan       Patient Active Problem List   Diagnosis    Fatty liver    Tachycardia    Hypertension    Anxiety    Bigeminy    Mitral regurgitation    Severe mitral regurgitation    Mitral valve disorder    Acute myocardial infarction of right ventricle (HCC)    Junctional bradycardia    Cardiogenic shock    PAF (paroxysmal atrial fibrillation) ([facility])    Torsades de pointes ([facility])         RV infarct, had progressed until extubation, now w/ vt/vfib, may need f/u cor angio and support devices, impella d/w cts however there would be concerns of device triggering pvcs/vt and ecmo may be more optimal in this setting.  Plans underway as per cts to arrange for all of this.     Shock, vasopressors/inotropes     S/p mvr, as ct surgery    VT/junctional rhythm, on amio as per ep [facility]     Critical care [id number]     Thank you for allowing me to participate in the care of your patient. Please call me with any questions [phone].      Edu Arias MD, FACC

## 2024-10-23 NOTE — PROGRESS NOTES
10/23/24 0323   Patient Observation   Pulse (!) 104   Respirations 12   SpO2 97 %   Breath Sounds   Right Upper Lobe Clear   Right Middle Lobe Clear   Right Lower Lobe Clear   Left Upper Lobe Clear   Left Lower Lobe Clear   Vent Information   Vent Mode AC/PRVC   Ventilator Settings   Vt (Set, mL) 500 mL   Resp Rate (Set) 14 bpm   PEEP/CPAP (cmH2O) 5   FiO2  25 %   Insp Time (sec) 1 sec   Vent Patient Data (Readings)   Vt (Measured) 508 mL   Peak Inspiratory Pressure (cmH2O) 24 cmH2O   Rate Measured 14 br/min   Minute Volume (L/min) 7.12 Liters   Mean Airway Pressure (cmH2O) 9.3 cmH20   Plateau Pressure (cm H2O) 0 cm H2O   Driving Pressure -5   Inspiratory Time 1 sec   I:E Ratio 1:3.30   I Time/ I Time % 1 s   Vent Alarm Settings   High Pressure (cmH2O) 45 cmH2O   Low Minute Volume (lpm) 2 L/min   High Minute Volume (lpm) 20 L/min   Low Exhaled Vt (ml) 200 mL   High Exhaled Vt (ml) 1000 mL   RR High (bpm) 40 br/min   Apnea (secs) 20 secs   Additional Respiratoray Assessments   Humidification Source Heated wire   Humidification Temp 37   Ambu Bag With Mask At Bedside Yes   Airway Clearance   Subglottic Suction Done No   ETT    Placement Date/Time: 10/21/24 0715   Present on Admission/Arrival: No  Placed By: In surgery  Placement Verified By: Auscultation;Capnometry  Preoxygenation: Yes  Mask Ventilation: Ventilated by mask (1)  Technique: Direct laryngoscopy  Airway Type: C...   Secured At 23 cm   Measured From Lips   ETT Placement Center   Secured By Commercial tube huitron   Site Assessment Dry   Cuff Pressure 30 cm H2O

## 2024-10-23 NOTE — PROCEDURES
PROCEDURE NOTE  Date: 10/23/2024   Name: Tomer Amaya  YOB: 1975    Intubation    Date/Time: 10/23/2024 11:30 AM    Performed by: Birgit Laws APRN - CNP  Authorized by: Birgit Laws APRN - CNP  Consent: The procedure was performed in an emergent situation.  Indications: airway protection and hypoxemia  Intubation method: video-assisted  Patient status: sedated  Preoxygenation: BVM  Pretreatment medications: midazolam  Sedatives: propofol  Paralytic: rocuronium  Tube size: 8.0 mm  Tube type: cuffed  Number of attempts: 1  Cricoid pressure: yes  Post-procedure assessment: ETCO2 monitor and chest rise  Breath sounds: equal  Cuff inflated: yes  ETT to teeth: 23 cm  Tube secured with: ETT huitron  Chest x-ray interpreted by me.  Chest x-ray findings: endotracheal tube in appropriate position  Comments: Patient intubated emergently after CODE BLUE.Some difficulty oxygenating after intubation despite appropriate tube placement, pulling adequate TV 500ml on vent. Patient paralyzed and saturations eventually responded after bagging and adequate sedation.

## 2024-10-23 NOTE — PROGRESS NOTES
[facility]     Electrophysiology                                     Progress Note    Admission date:  10/21/2024    Reason for follow up visit: bradycardia     HPI/CC: Tomer Amaya was admitted on 10/21/2024 and underwent open mitral valve ring repair for severe MR and JANINE clip. Post operative period complicated by RV infarct and intermittent junctional bradycardia. On 10/22/2024, briefly after extubation, he went into polymorphic ventricular tachycardia and a code blue was called. Patient was defibrillated x 2. He was re-intubated. Epinephrine was started for significant bronchospasm and hypoxia. Rhythm has been atrial fibrillation. Amiodarone was resumed.      Subjective: Unable to assess due to sedation     Vitals:  Blood pressure 115/76, pulse 89, temperature 97.5 °F (36.4 °C), temperature source Bladder, resp. rate 16, height 1.702 m (5' 7\"), weight 70.3 kg (155 lb), SpO2 97%.  Temp  Av.7 °F (37.1 °C)  Min: 97.5 °F (36.4 °C)  Max: 100.1 °F (37.8 °C)  Pulse  Av.7  Min: 83  Max: 113  BP  Min: 83/59  Max: 133/73  SpO2  Av.3 %  Min: 93 %  Max: 100 %  FiO2   Av.5 %  Min: 25 %  Max: 30 %    24 hour I/O    Intake/Output Summary (Last 24 hours) at 10/23/2024 0959  Last data filed at 10/23/2024 0956  Gross per 24 hour   Intake 8831.88 ml   Output 3085 ml   Net 5746.88 ml     Current Facility-Administered Medications   Medication Dose Route Frequency Provider Last Rate Last Admin    furosemide (LASIX) 100 mg in sodium chloride 0.9 % 100 mL infusion  2 mg/hr IntraVENous Continuous Dno Pendleton APRN - CNP        potassium bicarb-citric acid (EFFER-K) effervescent tablet 40 mEq  40 mEq Oral BID Don Pendleton APRN - CNP        sodium bicarbonate 8.4 % injection 50 mEq  50 mEq IntraVENous Q30 Min PRN Igor Landon MD   50 mEq at 10/22/24 2049    sodium chloride flush 0.9 % injection 5-40 mL  5-40 mL IntraVENous 2 times per day Igor Landon MD   10 mL at 10/23/24 0834  05:10 AM    K 3.8 10/23/2024 05:10 AM    K 3.8 01/12/2024 06:40 AM     10/23/2024 05:10 AM    CO2 22 10/23/2024 05:10 AM     CBC:    Lab Results   Component Value Date/Time    WBC 11.9 10/23/2024 05:10 AM    RBC 2.95 10/23/2024 05:10 AM    HGB 8.6 10/23/2024 05:10 AM    HCT 26.2 10/23/2024 05:10 AM    MCV 88.6 10/23/2024 05:10 AM    RDW 13.4 10/23/2024 05:10 AM     10/23/2024 05:10 AM     BNP:  No results found for: \"BNP\"  Fasting Lipid Panel:    Lab Results   Component Value Date/Time    CHOL 235 10/09/2024 10:15 AM    HDL 79 10/09/2024 10:15 AM    TRIG 51 10/09/2024 10:15 AM     Cardiac Enzymes:  CK/MbTroponinNo results found for: \"CKTOTAL\", \"CKMB\", \"CKMBINDEX\", \"TROPONINI\"  PT/ INR   Lab Results   Component Value Date/Time    INR 1.40 10/23/2024 05:10 AM    INR 1.54 10/22/2024 04:20 AM    INR 1.52 10/21/2024 12:00 PM    PROTIME 17.3 10/23/2024 05:10 AM    PROTIME 18.6 10/22/2024 04:20 AM    PROTIME 18.5 10/21/2024 12:00 PM     PTT No components found for: \"PTT\"   Lab Results   Component Value Date/Time    MG 1.97 10/23/2024 05:10 AM      Lab Results   Component Value Date/Time    TSH 1.01 11/04/2023 08:50 AM       Assessment:  Polymorphic ventricular tachycardia: noted this morning postextubation   -required defibrillation x 2  Junctional bradycardia: improved   Severe mitral valve regurgitation s/p open mitral valve ring repair 10/2024  Paroxysmal atrial fibrillation: stable, noted post op MVR   -[id number] score 1 (HTN)    -s/p JANINE clip 10/2024  RV infarct: noted post op, interventional cardiology following    -felt likely related to periop air   HTN  Anemia   Leukocytosis   Lactic acidosis       Plan:   1. Continue amiodarone  2. Maintain normal electrolytes   3. Closely monitor on telemetry. Notify EP if bradyarrhythmias are recurrent.   4. Discussed with Dr. Palomo. EP will continue to follow and will reassess the need for implantable cardiac device (PPM or ICD).    Critical care time spent

## 2024-10-23 NOTE — FLOWSHEET NOTE
10/23/24 1029   Vent Settings   Rate Measured 14 br/min   Vt (Measured) 607 mL   PEEP/CPAP (cmH2O) 5   Peak Inspiratory Pressure (cmH2O) 19 cmH2O     Placed on SBT, RT aware

## 2024-10-23 NOTE — PROGRESS NOTES
10/23/24 1131   Patient Observation   Pulse 89   Respirations 26   SpO2 (!) 88 %   Vent Information   Vent Mode AC/PRVC   Ventilator Settings   Vt (Set, mL) 500 mL   Resp Rate (Set) 14 bpm   PEEP/CPAP (cmH2O) 8   FiO2  100 %   Vent Patient Data (Readings)   Vt (Measured) 496 mL   Peak Inspiratory Pressure (cmH2O) 29 cmH2O   Rate Measured 26 br/min   Minute Volume (L/min) 12.1 Liters   Mean Airway Pressure (cmH2O) 16 cmH20   Plateau Pressure (cm H2O) 0 cm H2O   Driving Pressure -8   I:E Ratio 1:1.10   I Time/ I Time % 1 s   Vent Alarm Settings   High Pressure (cmH2O) 45 cmH2O   Low Minute Volume (lpm) 2 L/min   High Minute Volume (lpm) 20 L/min   Low Exhaled Vt (ml) 200 mL   High Exhaled Vt (ml) 1000 mL   RR High (bpm) 40 br/min   Apnea (secs) 20 secs   ETT    Placement Date/Time: 10/21/24 0715   Present on Admission/Arrival: No  Placed By: In surgery  Placement Verified By: Auscultation;Capnometry  Preoxygenation: Yes  Mask Ventilation: Ventilated by mask (1)  Technique: Direct laryngoscopy  Airway Type: C...   $ Intubation Emergent  (S)  $ Yes  (pt reintubated)   Secured At 23 cm   Measured From Lips   ETT Placement Center   Secured By Commercial tube king   Site Assessment Dry   Cuff Pressure 30 cm H2O   Tie/King Changed Yes

## 2024-10-23 NOTE — PROGRESS NOTES
Cardiac, Vascular & Thoracic Surgery  Discharge Summary    Patient:  Tomer Amaya 1975 [id number]   Admission Date:  10/21/2024  5:22 AM  Discharge Date:      Principle Diagnosis:  Mitral regurgitation    Secondary Diagnosis:  Principal Problem:    Mitral regurgitation  Active Problems:    Severe mitral regurgitation    Mitral valve disorder    Acute myocardial infarction of right ventricle (HCC)    Junctional bradycardia    Cardiogenic shock    PAF (paroxysmal atrial fibrillation) (HCC)    Torsades de pointes (HCC)  Resolved Problems:    * No resolved hospital problems. *      Cardiac Cath: 05/15/24    CARDIAC PROCEDURE 05/15/2024 11:28 AM (Final)    Conclusion  Successful left heart cath via right radial approach suggesting normal coronaries  Successful right heart cath via right brachial approach showing low filling pressures, normal PA pressures and PVR, preserved cardiac output/index    Signed by: Jaycob Chappell MD on 5/15/2024 11:28 AM     Procedure:  10/21/24 OPEN MITRAL VALVE REPAIR; LEFT ATRIAL APPENDAGE CLIP PLACEMENT; LONGITIDUNAL STERNAL STABILIZATION; TRANSESOPHAGEAL ECHOCARDIOGRAM; BILATERAL PECTORALIS BLOCKS; TOTAL CARDIOPULMONARY BYPASS     History:  The patient is a 49 y.o. male    Hospital Course:      DOS 10/21- Out of OR @ 12. 2 unit cryo given. Junctional rhythm w/ hemodynamic instability. STAT ECHO. RV dilation and dysfunction noted. Veletri stopped 2/2 VT.      POD #1 10/22- Remains intubated. Plan for temp Awire placement today w/ EP. Extubation pending placement of A wire.  On Milrinone and Dobutamine. Plan to slowly wean over next couple days. CI 3.1, CO 5.7, PAPA 23/19 (21).     POD # 2 10/23- Plan to work towards extubation today.  No need for temp A-wire as junctional episodes are transient. Holding shania agents. ECHO this AM shows improving RV function. Plan to start gentle diuresis today and possible weaning of Dobutamine pending clinical course. AF RVR around 4am, back in  PRINIVIL;ZESTRIL  Take 1 tablet by mouth daily               Patient Instructions:  Activity: DO NOT LIFT, PUSH, OR PULL ANYTHING OVER 5 POUNDS FOR 6 WEEK from the day of surgery  Diet:  NPO  Wound Care:  WASH YOUR INCISIONS DAILY WITH A CLEAN WASHCLOTH AND ANTIBACTERIAL SOAP. Do not wash your incisions after you have cleansed other parts of your body    Don Pendleton, APRN - CNP

## 2024-10-23 NOTE — PROGRESS NOTES
Physician Progress Note      PATIENT:               LISANDRA ESTRADA  CSN #:                  [id number]  :                       1975  ADMIT DATE:       10/21/2024 5:22 AM  DISCH DATE:  RESPONDING  PROVIDER #:        AMANDA CHRISTY          QUERY TEXT:    Pt admitted with mitral valve repair and has shock documented in 10/22 PN.  If   possible, please document in progress notes and discharge summary further   specificity regarding the type of shock:    The medical record reflects the following:  Risk Factors: post op RV infarct    Clinical Indicators: Per 10/22 PN- \"Shock, wean vasopressors as able\"  10/21 Cardio consult note-  presented to the hospital for elective mitral   valve surgery, he underwent mitral valve repair and postoperatively developed   hypotension, he was supported with vasopressors and inotropes, echocardiogram   was done which showed low normal to mildly reduced LV function with intact   mitral valve repair however RV dilation and dysfunction was noted.\"  10/23 PN- Cardiogenic shock documented in an undated Patient Active Problem   List    Treatment: Vasopressors, Inotropes, monitoring  Options provided:  -- Cardiogenic Shock  -- Other - I will add my own diagnosis  -- Disagree - Not applicable / Not valid  -- Disagree - Clinically unable to determine / Unknown  -- Refer to Clinical Documentation Reviewer    PROVIDER RESPONSE TEXT:    This patient is in cardiogenic shock.    Query created by: Zeinab Veronica on 10/23/2024 12:52 PM      Electronically signed by:  AMANDA CHRISTY 10/23/2024 1:21 PM

## 2024-10-23 NOTE — CARE COORDINATION
LOS 2.  Care managed by CV surg. Vent 25/5. Sedated, Pressors. S/P MVR. Still w PW and CTs. From home w mom and IPTA. CM following. Layne Prado RN

## 2024-10-23 NOTE — PROGRESS NOTES
0601)    milrinone 0.375 mcg/kg/min (10/23/24 0700)    VASOpressin Stopped (10/23/24 0648)       Hospital Course:  DOS 10/21 DAYS  -Out of OR at 12:00  -50g albumin   -2g calcium replaced  -K replaced  -2 units Cryo given  -Junctional rhythm, heart tones distant/muffled, STAT ECHO obtained  -Velitri started, runs of V-tach, velitri stopped  -Mediastinal CT output 35mL   -Pleural CT output 355 mL  -       Lab Data:  CBC:   Recent Labs     10/22/24  0420 10/22/24  0538 10/22/24  1655 10/23/24  0510   WBC 7.7  --   --  11.9*   HGB 8.3*   < > 8.6* 8.6*   HCT 25.1*  --   --  26.2*   MCV 88.3  --   --  88.6   PLT 95*  --   --  101*    < > = values in this interval not displayed.     BMP:    Recent Labs     10/22/24  0420 10/23/24  0510    144   K 4.4 3.8   CO2 20* 22   BUN 12 11   CREATININE 0.8* 0.7*     LIVR: No results for input(s): \"AST\", \"ALT\" in the last 72 hours.  PT/INR:   Recent Labs     10/22/24  0420 10/23/24  0510   INR 1.54* 1.40*     APTT:   Recent Labs     10/21/24  1200   APTT 37.2*     ABG:   Recent Labs     10/22/24  2025 10/23/24  0515   PHART 7.386 7.424   [id number] 34.2* 34.9*   PO2ART 98.8 83.7      POST OP DAY 0 NIGHTS  in and out of junctional rhythm  ST depression, followed by vent tachy- reduced CO/CI @xs     Urinary output 537  Pleural   Mediastinal CT 58  500ml Of Albumin given x 1    Potassium total of 100 meq IVPB given  1000mg of Calcium this am.     Temp  95.8 bare hugger until normothermic   Morphine 4mg x 1,   Versed 1mg x 1   1 dose of Oxycodone 10mg x 1   1 amp NaHCO3     SAT deferred this am as pt on hemodynamic supportive drips, but awakes spontaneously and follows all commands with RASS -1 and vent synchrony   Karrie Nick RN     Hospital Course: POD 1 NIGHTS   -2x amp bicarb given per protocol  -PRN pain meds given in attempt to establish stable pain control, patient vagals w/ any stimulation  -40 K+ being replaced  -Afib RVR on EKG, amio bolus and gtt per  protocol  -UOP adequate  -Labile BP requiring frequent titrations and pauses of vasopressors.  -Prop gtt off, responds to commands on Precedex.

## 2024-10-23 NOTE — PROGRESS NOTES
10/22/24 2313   Patient Observation   Pulse (!) 104   Respirations 10   SpO2 97 %   Breath Sounds   Right Upper Lobe Clear   Right Middle Lobe Clear   Right Lower Lobe Clear   Left Upper Lobe Clear   Left Lower Lobe Clear   Vent Information   Vent Mode AC/PRVC   Ventilator Settings   Vt (Set, mL) 500 mL   Resp Rate (Set) 14 bpm   PEEP/CPAP (cmH2O) 5   FiO2  25 %   Insp Time (sec) 1 sec   Vent Patient Data (Readings)   Vt (Measured) 516 mL   Peak Inspiratory Pressure (cmH2O) 25 cmH2O   Rate Measured 14 br/min   Minute Volume (L/min) 7.25 Liters   Mean Airway Pressure (cmH2O) 9.3 cmH20   Plateau Pressure (cm H2O) 0 cm H2O   Driving Pressure -5   Inspiratory Time 1 sec   I:E Ratio 1:3.30   I Time/ I Time % 1 s   Vent Alarm Settings   High Pressure (cmH2O) 45 cmH2O   Low Minute Volume (lpm) 2 L/min   High Minute Volume (lpm) 20 L/min   Low Exhaled Vt (ml) 200 mL   RR High (bpm) 40 br/min   Apnea (secs) 20 secs   Additional Respiratoray Assessments   Humidification Source Heated wire   Humidification Temp 36.9   Ambu Bag With Mask At Bedside Yes   Airway Clearance   Subglottic Suction Done No   ETT    Placement Date/Time: 10/21/24 0715   Present on Admission/Arrival: No  Placed By: In surgery  Placement Verified By: Auscultation;Capnometry  Preoxygenation: Yes  Mask Ventilation: Ventilated by mask (1)  Technique: Direct laryngoscopy  Airway Type: C...   Secured At 23 cm   Measured From Lips   ETT Placement Right   Secured By Commercial tube huitron   Site Assessment Dry   Cuff Pressure 30 cm H2O

## 2024-10-23 NOTE — PROGRESS NOTES
CVTS Cardiothoracic Progress Note:                                CC:  Post op follow up     Surgery: 10/21/24 OPEN MITRAL VALVE REPAIR; LEFT ATRIAL APPENDAGE CLIP PLACEMENT; LONGITIDUNAL STERNAL STABILIZATION; TRANSESOPHAGEAL ECHOCARDIOGRAM; BILATERAL PECTORALIS BLOCKS; TOTAL CARDIOPULMONARY BYPASS      Hospital course:    DOS 10/21- Out of OR @ 12. 2 unit cryo given. Junctional rhythm w/ hemodynamic instability. STAT ECHO. RV dilation and dysfunction noted. Veletri stopped 2/2 VT.     POD #1 10/22- Remains intubated. Plan for temp Awire placement today w/ EP. Extubation pending placement of A wire.  On Milrinone and Dobutamine. Plan to slowly wean over next couple days. CI 3.1, CO 5.7, PAPA 23/19 (21).    POD # 2 10/23- Plan to work towards extubation today.  No need for temp A-wire as junctional episodes are transient. Holding shania agents. ECHO this AM shows improving RV function. Plan to start gentle diuresis today and possible weaning of Dobutamine pending clinical course. AF RVR around 4am, back in NSR this AM.       Past Medical History:   Diagnosis Date    Anxiety     Hypertension     Mitral valve disease         Past Surgical History:   Procedure Laterality Date    CARDIAC PROCEDURE N/A 05/15/2024    Left and right heart cath / coronary angiography performed by Jaycob Chappell MD at [facility] CARDIAC CATH LAB    INVASIVE VASCULAR N/A 05/15/2024    Ultrasound guided vascular access performed by Jaycob Chappell MD at [facility] CARDIAC CATH LAB    MITRAL VALVE REPAIR N/A 10/21/2024    OPEN MITRAL VALVE REPAIR; LEFT ATRIAL APPENDAGE CLIP PLACEMENT; LONGITIDUNAL STERNAL STABILIZATION; TRANSESOPHAGEAL ECHOCARDIOGRAM; BILATERAL PECTORALIS BLOCKS; TOTAL CARDIOPULMONARY BYPASS performed by Igor Landon MD at [facility] CVOR    WISDOM TOOTH EXTRACTION          Allergies as of 10/01/2024    (No Known Allergies)        Patient Active Problem List   Diagnosis    Fatty liver    Tachycardia    Hypertension    Anxiety    Bigeminy     10/23/2024    Narrative  EXAMINATION:  ONE XRAY VIEW OF THE CHEST    10/23/2024 6:09 am    COMPARISON:  10/22/2024 radiograph    HISTORY:  ORDERING SYSTEM PROVIDED HISTORY: Post op open heart surgery  TECHNOLOGIST PROVIDED HISTORY:  Reason for exam:->Post op open heart surgery  Reason for Exam: Post op open heart surgery    FINDINGS:  Stable supportive devices.  Heart size normal following recent surgery.  Pulmonary vascular markings are normal.  Lungs are clear.  No significant  skeletal finding.    Impression  Stable chest with no acute finding.    ________________________________________________________________________    Subjective:   Dietary Intake: NPO    Nausea : BETY  Pain Control: BETY  Complaints: BETY  Bowels: No BM    Objective:   General appearance: Intubated,sedated. NAD   Lungs: diminished, ventilator associated breath sounds  Heart: S1S2 regular; SR on monitor  Chest: symmetrical expansion with inspiration and expirations; no rocking of sternum noted   Abdomen: rounded, soft  Bowel sounds: hypoactive  Kidneys: Cr 0.7 . UOP 2285 ml/last 24 hr  Wound/Incisions: Midsternal incision with dressing CDI; Pacing wires secure and connected  Extremities: BLE pulses + 2 ; + 1 swelling noted in BUE/BLE   Neurological: Intubated, sedated. Responds to voice . HENNESSY x 4   Chest tubes/Drains: Chest tube # 1 (pleural) with -240= 610 cc of serosanguineous drainage in 24 hours; Chest tube # 2 with 130-150-100= 380 cc of serosanguineous drainage in 24 hours; no airleaks noted in either tube     Assessment:   Post-op: 2 days.   Condition: In critical, stable condition.     Plan:   1. Cardiovascular:   - s/p open MVr, LAAC w/ LSS on 10/21. On ASA. Plavix D/C (no obstructive CAD)   - RV infarct 2/2 intra-op air. Repeat ECHO this AM w/ improving RV function.   - CVP 22, CO 4.6 , CI 2.5 , PAP 29/21 this AM. On Milrinone 0.375 mcg/kg and Dobutamine 2 mcg/kg. Plan to slowly wean Dobutamine today pending hemodynamics and

## 2024-10-24 LAB
EKG DIAGNOSIS: NORMAL
EKG Q-T INTERVAL: 390 MS
EKG QRS DURATION: 84 MS
EKG QTC CALCULATION (BAZETT): 551 MS
EKG R AXIS: 42 DEGREES
EKG T AXIS: 73 DEGREES
EKG VENTRICULAR RATE: 120 BPM

## 2024-10-24 PROCEDURE — 93010 ELECTROCARDIOGRAM REPORT: CPT | Performed by: INTERNAL MEDICINE

## 2024-10-25 LAB
BLOOD BANK DISPENSE STATUS: NORMAL
BLOOD BANK DISPENSE STATUS: NORMAL
BLOOD BANK PRODUCT CODE: NORMAL
BLOOD BANK PRODUCT CODE: NORMAL
BPU ID: NORMAL
BPU ID: NORMAL
DESCRIPTION BLOOD BANK: NORMAL
DESCRIPTION BLOOD BANK: NORMAL

## 2024-11-01 PROBLEM — I25.2 HISTORY OF ACUTE MYOCARDIAL INFARCTION: Status: ACTIVE | Noted: 2024-10-21

## 2024-12-17 ENCOUNTER — TELEPHONE (OUTPATIENT)
Dept: CARDIOLOGY CLINIC | Age: 49
End: 2024-12-17

## 2024-12-17 DIAGNOSIS — I26.99 PULMONARY EMBOLISM, UNSPECIFIED CHRONICITY, UNSPECIFIED PULMONARY EMBOLISM TYPE, UNSPECIFIED WHETHER ACUTE COR PULMONALE PRESENT (HCC): Primary | ICD-10-CM

## 2024-12-19 ENCOUNTER — OFFICE VISIT (OUTPATIENT)
Dept: CARDIOLOGY CLINIC | Age: 49
End: 2024-12-19
Payer: COMMERCIAL

## 2024-12-19 VITALS
HEART RATE: 81 BPM | HEIGHT: 67 IN | DIASTOLIC BLOOD PRESSURE: 76 MMHG | BODY MASS INDEX: 25.33 KG/M2 | WEIGHT: 161.4 LBS | OXYGEN SATURATION: 100 % | SYSTOLIC BLOOD PRESSURE: 114 MMHG

## 2024-12-19 DIAGNOSIS — I49.8 BIGEMINY: ICD-10-CM

## 2024-12-19 DIAGNOSIS — I42.9 CARDIOMYOPATHY, UNSPECIFIED TYPE (HCC): ICD-10-CM

## 2024-12-19 DIAGNOSIS — Z98.890 S/P MVR (MITRAL VALVE REPAIR): ICD-10-CM

## 2024-12-19 DIAGNOSIS — Z98.890 STATUS POST MITRAL VALVE ANNULOPLASTY: ICD-10-CM

## 2024-12-19 DIAGNOSIS — Z79.899 MEDICATION MANAGEMENT: ICD-10-CM

## 2024-12-19 DIAGNOSIS — I25.2 HISTORY OF ACUTE MYOCARDIAL INFARCTION: ICD-10-CM

## 2024-12-19 DIAGNOSIS — R57.0 CARDIOGENIC SHOCK: ICD-10-CM

## 2024-12-19 DIAGNOSIS — I47.21 TORSADES DE POINTES (HCC): Primary | ICD-10-CM

## 2024-12-19 DIAGNOSIS — I82.4Y9 ACUTE VENOUS EMBOLISM AND THROMBOSIS OF DEEP VESSELS OF PROXIMAL LOWER EXTREMITY, UNSPECIFIED LATERALITY (HCC): ICD-10-CM

## 2024-12-19 DIAGNOSIS — I48.0 PAF (PAROXYSMAL ATRIAL FIBRILLATION) (HCC): ICD-10-CM

## 2024-12-19 DIAGNOSIS — I10 PRIMARY HYPERTENSION: ICD-10-CM

## 2024-12-19 DIAGNOSIS — I42.0 DILATED CARDIOMYOPATHY (HCC): ICD-10-CM

## 2024-12-19 PROCEDURE — 99214 OFFICE O/P EST MOD 30 MIN: CPT | Performed by: STUDENT IN AN ORGANIZED HEALTH CARE EDUCATION/TRAINING PROGRAM

## 2024-12-19 PROCEDURE — 3078F DIAST BP <80 MM HG: CPT | Performed by: STUDENT IN AN ORGANIZED HEALTH CARE EDUCATION/TRAINING PROGRAM

## 2024-12-19 PROCEDURE — 3074F SYST BP LT 130 MM HG: CPT | Performed by: STUDENT IN AN ORGANIZED HEALTH CARE EDUCATION/TRAINING PROGRAM

## 2024-12-19 RX ORDER — FUROSEMIDE 20 MG/1
20 TABLET ORAL DAILY PRN
Qty: 30 TABLET | Refills: 5 | Status: SHIPPED | OUTPATIENT
Start: 2024-12-19

## 2024-12-19 NOTE — PATIENT INSTRUCTIONS
Continue amiodarone 200mg daily, aspirin 81mg daily, metoprolol 25mg daily  Start taking furosemide 20mg daily until weight is back down to 157lbs. Then take furosemide as needed for weight gain >3 lbs in 24 hours or >5 lbs in 3 days and/or increased swelling/shortness of breath  Please obtain the following labs 1 week; -BMP, CBC  Limited echo   Start taking jardiance 10mg daily. Please start this after you finish the lasix for 3 days. Patient verbalizes understanding of the need for treatment and education provided at today's visit. Additional education materials will be provided in the AVS.   Call Dr. Soriano to get your PICC line out. [phone]

## 2024-12-19 NOTE — PROGRESS NOTES
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 antibiotics, left greater trochanter fracture, left vocal cord hypomobility, and delirium.  Patient is following with  CT surgery, heart failure clinic, ID, and EP.  He has a follow-up appoint with the EP to discuss the need for ICD.  For the DVT/PE, I referred patient to the Coumadin clinic.  Patient overall states he has been doing well.  He states he has had increasing lower extremity edema over the past few days and has noticed a 4 pound weight gain.  Patient's preop and discharge weight was 157 pounds.  He is currently 161 pounds today in the office.  Patient examines as acute on chronic right-sided heart failure.  He is warm/wet and mildly volume overloaded with 1+ bilateral lower extremity edema. He reports that Dr. Landon is going to extend his short term disability paperwork for another 6 months.   Continue amiodarone 200mg daily, aspirin 81mg daily, metoprolol 25mg daily  Start taking furosemide 20mg daily until weight is back down to 157lbs. Then take furosemide as needed for weight gain >3 lbs in 24 hours or >5 lbs in 3 days and/or increased swelling/shortness of breath  Please obtain the following labs 1 week; -BMP, CBC (he goes to [facility] for blood work [id number])  Limited echo to reassess RV/LV/pericardial effusion.  Pericardial effusion likely secondary postop.  There was noted respirophasic variation which could have been secondary to the degree of his RV dysfunction.  Start taking jardiance 10mg daily. Please start this after you finish the lasix for 3 days. Patient verbalizes understanding of the need for treatment and education provided at today's visit. Additional education materials will be provided in the AVS.   Call Dr. Soriano to get your PICC line out. [phone]  Patient has never smoked-Educational material provided to patient.  Patient to follow-up with EP to

## 2024-12-27 ENCOUNTER — ANTI-COAG VISIT (OUTPATIENT)
Dept: PHARMACY | Age: 49
End: 2024-12-27
Payer: COMMERCIAL

## 2024-12-27 DIAGNOSIS — I26.99 PULMONARY EMBOLISM, OTHER, UNSPECIFIED CHRONICITY, UNSPECIFIED WHETHER ACUTE COR PULMONALE PRESENT (HCC): Primary | ICD-10-CM

## 2024-12-27 LAB
INTERNATIONAL NORMALIZATION RATIO, POC: 3
PROTHROMBIN TIME, POC: 0

## 2024-12-27 PROCEDURE — 85610 PROTHROMBIN TIME: CPT

## 2024-12-27 PROCEDURE — 99202 OFFICE O/P NEW SF 15 MIN: CPT

## 2024-12-27 NOTE — PROGRESS NOTES
Mr. Tomer Amaya is a 49 y.o. y/o male with history of PE who presents today for anticoagulation monitoring and adjustment. Patient was being managed by  anticoagulation clinic, but transferred to [facility] Anticoagulation clinic as it is closer.    Pertinent PMH: HTN, Afib, Endocarditis, HF    Patient Reported Findings:  Yes     No  [x]   []       Patient verifies current dosing regimen as listed  - Patient has warfarin 5 mg tablets  - Patient reports he is taking 1 tablet (5 mg) daily  []   [x]       S/S bleeding/bruising/swelling/SOB  []   [x]       Blood in urine or stool  []   [x]       Procedures scheduled in the future at this time  []   [x]       Missed Dose  []   [x]       Extra Dose  [x]   []       Change in medications  - Med Rec completed at this visit  - Recently completed course of daptomycin and ertapenem   []   [x]       Change in health/diet/appetite  - Patient reports he avoids vitamin k containing foods  []   [x]       Change in alcohol use  []   [x]       Change in activity  []   [x]       Hospital admission  []   [x]       Emergency department visit  []   [x]       Other complaints    Clinical Outcomes:  Yes     No  []   [x]       Major bleeding event  []   [x]       Thromboembolic event      Duration of warfarin Therapy: 3 months  INR Range: 2-3    As initial clinic visit, provided patient with clinic overview, orientation, and educational materials.  Patient refused counseling for medication use/compliance, adverse events, and nutrition (per patient, already received from  anticoagulation clinic)    INR 3.0 is within therapeutic range of 2-3  Recommend continue 5mg daily  Patient has warfarin 5mg tablets  Will continue to monitor and check INR in 1 week.  Dosing reminder card given with phone number, appointment date and time.   Return to clinic: 1/3/2025    Lawrence Grewal, PharmD 2024 8:20 AM    For Pharmacy Admin Tracking Only  Intervention Detail: Adherence Monitorin  Total

## 2025-01-03 ENCOUNTER — ANTI-COAG VISIT (OUTPATIENT)
Dept: PHARMACY | Age: 50
End: 2025-01-03
Payer: COMMERCIAL

## 2025-01-03 DIAGNOSIS — I26.99 PULMONARY EMBOLISM, OTHER, UNSPECIFIED CHRONICITY, UNSPECIFIED WHETHER ACUTE COR PULMONALE PRESENT (HCC): Primary | ICD-10-CM

## 2025-01-03 LAB
INTERNATIONAL NORMALIZATION RATIO, POC: 2.9
PROTHROMBIN TIME, POC: 0

## 2025-01-03 PROCEDURE — 99211 OFF/OP EST MAY X REQ PHY/QHP: CPT

## 2025-01-03 PROCEDURE — 85610 PROTHROMBIN TIME: CPT

## 2025-01-03 NOTE — PROGRESS NOTES
Mr. Tomer Amaya is a 49 y.o. y/o male with history of PE who presents today for anticoagulation monitoring and adjustment. Patient was being managed by  anticoagulation clinic, but transferred to [facility] Anticoagulation clinic as it is closer.    Pertinent PMH: HTN, Afib, Endocarditis, HF    Patient Reported Findings:  Yes     No  [x]   []       Patient verifies current dosing regimen as listed  - Patient has warfarin 5 mg tablets  - Patient reports he is taking 1 tablet (5 mg) daily  []   [x]       S/S bleeding/bruising/swelling/SOB  []   [x]       Blood in urine or stool  []   [x]       Procedures scheduled in the future at this time  []   [x]       Missed Dose  []   [x]       Extra Dose  [x]   []       Change in medications  []   [x]       Change in health/diet/appetite  - Patient reports he avoids vitamin k containing foods  []   [x]       Change in alcohol use  []   [x]       Change in activity  []   [x]       Hospital admission  []   [x]       Emergency department visit  []   [x]       Other complaints    Clinical Outcomes:  Yes     No  []   [x]       Major bleeding event  []   [x]       Thromboembolic event    Duration of warfarin Therapy: 3 months  INR Range: 2-3    INR 2.9 is within therapeutic range of 2-3  Recommend continue 5mg daily  Patient has warfarin 5mg tablets  Will continue to monitor and check INR in 2-3 weeks.  Dosing reminder card given with phone number, appointment date and time.   Return to clinic: 1/20/2025    Lawrence Grewal PharmD 1/3/2025 8:25 AM    For Pharmacy Admin Tracking Only  Intervention Detail:   Total # of Interventions Recommended: 0  Total # of Interventions Accepted: 0  Time Spent (min): 15

## 2025-01-07 ENCOUNTER — TELEPHONE (OUTPATIENT)
Dept: CARDIOLOGY CLINIC | Age: 50
End: 2025-01-07

## 2025-01-07 ENCOUNTER — HOSPITAL ENCOUNTER (OUTPATIENT)
Dept: CARDIOLOGY | Age: 50
Discharge: HOME | End: 2025-01-09
Attending: STUDENT IN AN ORGANIZED HEALTH CARE EDUCATION/TRAINING PROGRAM
Payer: COMMERCIAL

## 2025-01-07 VITALS
HEIGHT: 66 IN | DIASTOLIC BLOOD PRESSURE: 76 MMHG | BODY MASS INDEX: 25.88 KG/M2 | SYSTOLIC BLOOD PRESSURE: 114 MMHG | WEIGHT: 161 LBS

## 2025-01-07 DIAGNOSIS — Z98.890 STATUS POST MITRAL VALVE ANNULOPLASTY: ICD-10-CM

## 2025-01-07 DIAGNOSIS — I42.9 CARDIOMYOPATHY, UNSPECIFIED TYPE (HCC): ICD-10-CM

## 2025-01-07 LAB
ECHO BSA: 1.84 M2
ECHO LA AREA 2C: 16.7 CM2
ECHO LA AREA 4C: 19.4 CM2
ECHO LA MAJOR AXIS: 5 CM
ECHO LA MINOR AXIS: 4.6 CM
ECHO LA VOL BP: 55 ML (ref 18–58)
ECHO LA VOL MOD A2C: 48 ML (ref 18–58)
ECHO LA VOL MOD A4C: 58 ML (ref 18–58)
ECHO LA VOL/BSA BIPLANE: 30 ML/M2 (ref 16–34)
ECHO LA VOLUME INDEX MOD A2C: 26 ML/M2 (ref 16–34)
ECHO LA VOLUME INDEX MOD A4C: 32 ML/M2 (ref 16–34)
ECHO LV E' SEPTAL VELOCITY: 11.6 CM/S
ECHO LV EDV A2C: 81 ML
ECHO LV EDV A4C: 103 ML
ECHO LV EDV INDEX A4C: 57 ML/M2
ECHO LV EDV NDEX A2C: 45 ML/M2
ECHO LV EJECTION FRACTION A2C: 35 %
ECHO LV EJECTION FRACTION A4C: 34 %
ECHO LV EJECTION FRACTION BIPLANE: 33 % (ref 55–100)
ECHO LV ESV A2C: 53 ML
ECHO LV ESV A4C: 68 ML
ECHO LV ESV INDEX A2C: 29 ML/M2
ECHO LV ESV INDEX A4C: 37 ML/M2
ECHO LV FRACTIONAL SHORTENING: 7 % (ref 28–44)
ECHO LV INTERNAL DIMENSION DIASTOLE INDEX: 2.42 CM/M2
ECHO LV INTERNAL DIMENSION DIASTOLIC: 4.4 CM (ref 4.2–5.9)
ECHO LV INTERNAL DIMENSION SYSTOLIC INDEX: 2.25 CM/M2
ECHO LV INTERNAL DIMENSION SYSTOLIC: 4.1 CM
ECHO LV ISOVOLUMETRIC RELAXATION TIME (IVRT): 74 MS
ECHO LV IVSD: 0.9 CM (ref 0.6–1)
ECHO LV MASS 2D: 128 G (ref 88–224)
ECHO LV MASS INDEX 2D: 70.3 G/M2 (ref 49–115)
ECHO LV POSTERIOR WALL DIASTOLIC: 0.9 CM (ref 0.6–1)
ECHO LV RELATIVE WALL THICKNESS RATIO: 0.41
ECHO MV A VELOCITY: 1.26 M/S
ECHO MV E DECELERATION TIME (DT): 149 MS
ECHO MV E VELOCITY: 1.49 M/S
ECHO MV E/A RATIO: 1.18
ECHO MV E/E' SEPTAL: 12.84
ECHO MV MAX VELOCITY: 1.6 M/S
ECHO MV MEAN GRADIENT: 6 MMHG
ECHO MV MEAN VELOCITY: 1.1 M/S
ECHO MV PEAK GRADIENT: 10 MMHG
ECHO MV VTI: 32.8 CM

## 2025-01-07 PROCEDURE — 93325 DOPPLER ECHO COLOR FLOW MAPG: CPT | Performed by: INTERNAL MEDICINE

## 2025-01-07 PROCEDURE — 93321 DOPPLER ECHO F-UP/LMTD STD: CPT | Performed by: INTERNAL MEDICINE

## 2025-01-07 PROCEDURE — 93308 TTE F-UP OR LMTD: CPT

## 2025-01-07 PROCEDURE — 93308 TTE F-UP OR LMTD: CPT | Performed by: INTERNAL MEDICINE

## 2025-01-07 RX ORDER — EPLERENONE 50 MG/1
50 TABLET, FILM COATED ORAL DAILY
Qty: 90 TABLET | Refills: 3 | Status: SHIPPED | OUTPATIENT
Start: 2025-01-07

## 2025-01-07 RX ORDER — WARFARIN SODIUM 5 MG/1
5 TABLET ORAL DAILY
Qty: 30 TABLET | Refills: 11 | Status: SHIPPED | OUTPATIENT
Start: 2025-01-07

## 2025-01-07 RX ORDER — AMIODARONE HYDROCHLORIDE 200 MG/1
200 TABLET ORAL DAILY
Qty: 30 TABLET | Refills: 1 | Status: SHIPPED | OUTPATIENT
Start: 2025-01-07

## 2025-01-07 NOTE — TELEPHONE ENCOUNTER
Pt is requesting refill of Amiodarone 200mg, Asprin 81mg, Inspra 50mg, Metorprol Succinate 25mg, Protonix 40mg, Warfrain 5mg. Preferred pharmacy     [id number] [place] [facility] [place] [place] [facility] [address] - P [phone] -     Last ov 12/19/2024 amp  Next ov 02/11/2025 amp

## 2025-01-07 NOTE — TELEPHONE ENCOUNTER
Antonio Jackson, DO  You; Christi Pedro, RN3 hours ago (10:31 AM)         Coumadin I can fill since I referred him to the Coumadin clinic here.  I could also fill eplerenone (inspra) but I believe he is seeing advanced heart failure at  now as well.     Antonio Jackson, DO  You; Christi Pedro, RN3 hours ago (10:30 AM)         Patient is seeing several specialist including EP at .  He is going to keep me for general cardiology needs and follow-ups.  Will defer those medications to the EP provider at  that he sees.

## 2025-01-07 NOTE — TELEPHONE ENCOUNTER
Dr. Jackson, was not sure if you are handling the Amiodarone, Inspra or Warfarin. He had a follow up with  [facility], Dr. Walton on 12/13/24.  See his plan regarding ICD and life vest.    Last PT/INR 1/3/25  - 2.9

## 2025-01-07 NOTE — TELEPHONE ENCOUNTER
Patient informed. Verbalized understanding.   He is out of amio and can't get ahold of UC. Per [facility] ok to fill for 30 days.

## 2025-01-20 ENCOUNTER — ANTI-COAG VISIT (OUTPATIENT)
Dept: PHARMACY | Age: 50
End: 2025-01-20
Payer: COMMERCIAL

## 2025-01-20 DIAGNOSIS — I26.99 PULMONARY EMBOLISM, OTHER, UNSPECIFIED CHRONICITY, UNSPECIFIED WHETHER ACUTE COR PULMONALE PRESENT (HCC): Primary | ICD-10-CM

## 2025-01-20 LAB
INTERNATIONAL NORMALIZATION RATIO, POC: 1.6
PROTHROMBIN TIME, POC: 0

## 2025-01-20 PROCEDURE — 85610 PROTHROMBIN TIME: CPT

## 2025-01-20 PROCEDURE — 99211 OFF/OP EST MAY X REQ PHY/QHP: CPT

## 2025-01-20 RX ORDER — VALSARTAN 40 MG/1
20 TABLET ORAL 2 TIMES DAILY
COMMUNITY

## 2025-01-20 NOTE — PROGRESS NOTES
Mr. Tomer Amaya is a 49 y.o. y/o male with history of PE who presents today for anticoagulation monitoring and adjustment. Patient was being managed by  anticoagulation clinic, but transferred to [facility] Anticoagulation clinic as it is closer.    Pertinent PMH: HTN, Afib, Endocarditis, HF    Patient Reported Findings:  Yes     No  [x]   []       Patient verifies current dosing regimen as listed  - Patient has warfarin 5 mg tablets  - Patient takes warfarin in PM  [x]   []       S/Sx bleeding/bruising/swelling/SOB  - Patient with some swelling in ankles/legs, saw cardiology who increased furosemide dose  []   [x]       Blood in urine or stool  [x]   []       Procedures scheduled in the future at this time  - Dual Chamber Implantable Cardioverter Defibrillator (ICD) on 2/3/25   - Per patient does not need to stop warfarin  []   [x]       Missed Dose  []   [x]       Extra Dose  [x]   []       Change in medications  - Patient started valsartan 20 mg BID x 3-5 days then increase to 40 mg BID  - Furosemide increased to 40 mg daily  []   [x]       Change in health/diet/appetite  - Patient reports he avoids vitamin k containing foods  []   [x]       Change in alcohol use  []   [x]       Change in activity  []   [x]       Hospital admission  []   [x]       Emergency department visit  []   [x]       Other complaints    Clinical Outcomes:  Yes     No  []   [x]       Major bleeding event  []   [x]       Thromboembolic event    Duration of warfarin Therapy: 3 months  INR Range: 2-3    INR 1.6 is BELOW therapeutic range of 2-3  Recommend to take 7.5 mg today then continue 5 mg daily (7.1% increase)  Patient has warfarin 5mg tablets  Will continue to monitor and check INR in 1 week.  Dosing reminder card given with phone number, appointment date and time.   Return to clinic: 1/27/2025 at 08:30    Referral Provider: Dr. Antonio Jackson  Referral Date: 12/17/2024  CPA: Signed    Mamie Del ValleD 1/20/2025 8:24 AM    For

## 2025-01-27 ENCOUNTER — ANTI-COAG VISIT (OUTPATIENT)
Dept: PHARMACY | Age: 50
End: 2025-01-27
Payer: COMMERCIAL

## 2025-01-27 DIAGNOSIS — I26.99 PULMONARY EMBOLISM, OTHER, UNSPECIFIED CHRONICITY, UNSPECIFIED WHETHER ACUTE COR PULMONALE PRESENT (HCC): Primary | ICD-10-CM

## 2025-01-27 LAB
INTERNATIONAL NORMALIZATION RATIO, POC: 2.4
PROTHROMBIN TIME, POC: 0

## 2025-01-27 PROCEDURE — 99211 OFF/OP EST MAY X REQ PHY/QHP: CPT

## 2025-01-27 PROCEDURE — 85610 PROTHROMBIN TIME: CPT

## 2025-01-27 NOTE — PROGRESS NOTES
Mr. Tomer Amaya is a 49 y.o. y/o male with history of PE who presents today for anticoagulation monitoring and adjustment. Patient was being managed by  anticoagulation clinic, but transferred to [facility] Anticoagulation clinic as it is closer.    Pertinent PMH: HTN, Afib, Endocarditis, HF    Patient Reported Findings:  Yes     No  [x]   []       Patient verifies current dosing regimen as listed  - Patient has warfarin 5 mg tablets AND 7.5 mg tablets  [x]   []       S/Sx bleeding/bruising/swelling/SOB  - Patient with some swelling in ankles/legs, saw cardiology who increased furosemide dose  []   [x]       Blood in urine or stool  [x]   []       Procedures scheduled in the future at this time  - Dual Chamber Implantable Cardioverter Defibrillator (ICD) on 2/3/25   - Per patient does not need to stop warfarin  []   [x]       Missed Dose  []   [x]       Extra Dose  [x]   []       Change in medications  - Patient started valsartan 20 mg BID x 3-5 days then increase to 40 mg BID  - Furosemide increased to 40 mg daily  []   [x]       Change in health/diet/appetite  - Patient reports he avoids vitamin k containing foods  []   [x]       Change in alcohol use  []   [x]       Change in activity  []   [x]       Hospital admission  []   [x]       Emergency department visit  []   [x]       Other complaints    Clinical Outcomes:  Yes     No  []   [x]       Major bleeding event  []   [x]       Thromboembolic event    Duration of warfarin Therapy: 3 months  INR Range: 2-3    INR 1.6 is BELOW therapeutic range of 2-3  Recommend to continue 7.5 mg every Mon; 5 mg all other days  Will continue to monitor and check INR in 2 weeks.  Dosing reminder card given with phone number, appointment date and time.   Return to clinic: 2/10/25 at 08:45    Referral Provider: Dr. Antonio Jackson  Referral Date: 12/17/2024  CPA: Signed    Lawrence Grewal PharmD 1/27/2025 8:12 AM    For Pharmacy Admin Tracking Only    Intervention Detail:

## 2025-02-10 ENCOUNTER — ANTI-COAG VISIT (OUTPATIENT)
Dept: PHARMACY | Age: 50
End: 2025-02-10
Payer: COMMERCIAL

## 2025-02-10 DIAGNOSIS — I26.99 PULMONARY EMBOLISM, OTHER, UNSPECIFIED CHRONICITY, UNSPECIFIED WHETHER ACUTE COR PULMONALE PRESENT (HCC): Primary | ICD-10-CM

## 2025-02-10 LAB
INTERNATIONAL NORMALIZATION RATIO, POC: 2.6
PROTHROMBIN TIME, POC: 0

## 2025-02-10 PROCEDURE — 99211 OFF/OP EST MAY X REQ PHY/QHP: CPT

## 2025-02-10 PROCEDURE — 85610 PROTHROMBIN TIME: CPT

## 2025-02-10 NOTE — PROGRESS NOTES
Mr. Tomer Amaya is a 49 y.o. y/o male with history of PE who presents today for anticoagulation monitoring and adjustment. Patient was being managed by  anticoagulation clinic, but transferred to [facility] Anticoagulation clinic as it is closer.    Pertinent PMH: HTN, Afib, Endocarditis, HF    Patient Reported Findings:  Yes     No  [x]   []       Patient verifies current dosing regimen as listed  - Warfarin 7.5 mg every Mon; 5 mg all other days  - Patient has warfarin 5 mg tablets AND 7.5 mg tablets  []   [x]       S/Sx bleeding/bruising/swelling/SOB/CP  []   [x]       Blood in urine or stool  []   [x]       Procedures scheduled in the future at this time  [x]   []       Missed Dose  - Patient held warfarin (1/31/25 to 2/2/25) for procedure on 2/3/2025  [x]   []       Extra Dose  - Patient took 7.5 mg x 3 days after procedure (2/3/25 to 2/5/25)  []   [x]       Change in medications  []   [x]       Change in health/diet/appetite  - Patient reports he avoids vitamin k containing foods  []   [x]       Change in alcohol use  []   [x]       Change in activity  []   [x]       Hospital admission  []   [x]       Emergency department visit  []   [x]       Other complaints    Clinical Outcomes:  Yes     No  []   [x]       Major bleeding event  []   [x]       Thromboembolic event    Duration of warfarin Therapy: 3 months  INR Range: 2-3      INR 2.6 is WITHIN the therapeutic range of 2-3  Recommend to continue 7.5 mg every Mon; 5 mg all other days  Will continue to monitor and check INR in 4 weeks.  Dosing reminder card given with phone number, appointment date and time.   Return to clinic: 3/10/25 at 08:45    Referral Provider: Dr. Antonio Jackson  Referral Date: 12/17/2024  CPA: Signed    Lawrence Grewal, Erlinda 2/10/2025 8:40 AM    For Pharmacy Admin Tracking Only  Intervention Detail:   Total # of Interventions Recommended: 0  Total # of Interventions Accepted: 0  Time Spent (min): 15

## 2025-02-11 ENCOUNTER — OFFICE VISIT (OUTPATIENT)
Dept: CARDIOLOGY CLINIC | Age: 50
End: 2025-02-11
Payer: COMMERCIAL

## 2025-02-11 VITALS
BODY MASS INDEX: 27.93 KG/M2 | WEIGHT: 173.8 LBS | DIASTOLIC BLOOD PRESSURE: 66 MMHG | HEIGHT: 66 IN | OXYGEN SATURATION: 98 % | SYSTOLIC BLOOD PRESSURE: 114 MMHG | HEART RATE: 76 BPM

## 2025-02-11 DIAGNOSIS — I50.23 ACUTE ON CHRONIC HFREF (HEART FAILURE WITH REDUCED EJECTION FRACTION) (HCC): ICD-10-CM

## 2025-02-11 DIAGNOSIS — Z98.890 S/P MVR (MITRAL VALVE REPAIR): ICD-10-CM

## 2025-02-11 DIAGNOSIS — I42.0 DILATED CARDIOMYOPATHY (HCC): ICD-10-CM

## 2025-02-11 DIAGNOSIS — I49.8 BIGEMINY: ICD-10-CM

## 2025-02-11 DIAGNOSIS — Z95.810 S/P ICD (INTERNAL CARDIAC DEFIBRILLATOR) PROCEDURE: ICD-10-CM

## 2025-02-11 DIAGNOSIS — I10 PRIMARY HYPERTENSION: Primary | ICD-10-CM

## 2025-02-11 DIAGNOSIS — I82.4Y9 ACUTE VENOUS EMBOLISM AND THROMBOSIS OF DEEP VESSELS OF PROXIMAL LOWER EXTREMITY, UNSPECIFIED LATERALITY (HCC): ICD-10-CM

## 2025-02-11 DIAGNOSIS — I34.0 SEVERE MITRAL REGURGITATION: ICD-10-CM

## 2025-02-11 DIAGNOSIS — I25.2 HISTORY OF ACUTE MYOCARDIAL INFARCTION: ICD-10-CM

## 2025-02-11 DIAGNOSIS — I47.21 TORSADES DE POINTES (HCC): ICD-10-CM

## 2025-02-11 PROBLEM — I50.22 HEART FAILURE WITH MILDLY REDUCED EJECTION FRACTION (HFMREF) (HCC): Status: ACTIVE | Noted: 2025-01-15

## 2025-02-11 PROCEDURE — 3074F SYST BP LT 130 MM HG: CPT | Performed by: STUDENT IN AN ORGANIZED HEALTH CARE EDUCATION/TRAINING PROGRAM

## 2025-02-11 PROCEDURE — 99214 OFFICE O/P EST MOD 30 MIN: CPT | Performed by: STUDENT IN AN ORGANIZED HEALTH CARE EDUCATION/TRAINING PROGRAM

## 2025-02-11 PROCEDURE — 3078F DIAST BP <80 MM HG: CPT | Performed by: STUDENT IN AN ORGANIZED HEALTH CARE EDUCATION/TRAINING PROGRAM

## 2025-02-11 RX ORDER — METOPROLOL SUCCINATE 25 MG/1
25 TABLET, EXTENDED RELEASE ORAL DAILY
Qty: 90 TABLET | Refills: 3 | Status: SHIPPED | OUTPATIENT
Start: 2025-02-11

## 2025-02-11 NOTE — PATIENT INSTRUCTIONS
Continue amiodarone 200mg daily, aspirin 81mg daily, metoprolol 25mg daily, jardiance 10mg daily, laix 40mg daily, valsartan 40mg twice a day, coumadin.   Start taking lasix 40mg twice a day until your weight is 157-160lbs. Then go back to daily.  Limit your fluid intake to 64oz (2 liters) daily.   Limit salt intake to 2g.    Recommend obtaining knee high compression stockings from [facility], [address] [place] [address]. We recommend 20-30 mmHg. Wear these during the day and take off at night. Would also recommend elevating feet when sitting to help mobilize fluid.

## 2025-03-03 NOTE — TELEPHONE ENCOUNTER
Last Office Visit: 2/11/2025 Provider: [facility]  **Is provider OOT? No    Next Office Visit: 6/19/2025 Provider: [facility]      LAST LABS:   BMP:   Lab Results   Component Value Date/Time     10/23/2024 11:50 AM    K 3.8 10/23/2024 11:50 AM    K 3.8 01/12/2024 06:40 AM     10/23/2024 11:50 AM    CO2 19 10/23/2024 11:50 AM    BUN 12 10/23/2024 11:50 AM    CREATININE 0.8 10/23/2024 11:50 AM    GLUCOSE 174 10/23/2024 11:50 AM    CALCIUM 9.4 10/23/2024 11:50 AM    LABGLOM >90 10/23/2024 11:50 AM    LABGLOM >90 04/19/2024 11:09 AM

## 2025-03-04 RX ORDER — AMIODARONE HYDROCHLORIDE 200 MG/1
200 TABLET ORAL DAILY
Qty: 90 TABLET | Refills: 1 | OUTPATIENT
Start: 2025-03-04

## 2025-03-04 NOTE — TELEPHONE ENCOUNTER
Antonio Jackson, Marilee Downing MA; Isabel Salmeron, RN  Caller: Unspecified (Yesterday, 11:33 AM)  Patient sees EP at . This should go to them. I am not sure what their long term plan is for amiodarone.

## 2025-03-10 ENCOUNTER — ANTI-COAG VISIT (OUTPATIENT)
Dept: PHARMACY | Age: 50
End: 2025-03-10
Payer: COMMERCIAL

## 2025-03-10 DIAGNOSIS — I26.99 PULMONARY EMBOLISM, OTHER, UNSPECIFIED CHRONICITY, UNSPECIFIED WHETHER ACUTE COR PULMONALE PRESENT (HCC): Primary | ICD-10-CM

## 2025-03-10 LAB
INTERNATIONAL NORMALIZATION RATIO, POC: 2.2
PROTHROMBIN TIME, POC: 0

## 2025-03-10 PROCEDURE — 85610 PROTHROMBIN TIME: CPT

## 2025-03-10 PROCEDURE — 99211 OFF/OP EST MAY X REQ PHY/QHP: CPT

## 2025-03-10 RX ORDER — SACUBITRIL AND VALSARTAN 24; 26 MG/1; MG/1
1 TABLET, FILM COATED ORAL 2 TIMES DAILY
COMMUNITY

## 2025-03-10 NOTE — PROGRESS NOTES
Mr. Tomer Amaya is a 49 y.o. y/o male with history of PE who presents today for anticoagulation monitoring and adjustment. Patient was being managed by  anticoagulation clinic, but transferred to [facility] Anticoagulation clinic as it is closer.    Pertinent PMH: HTN, Afib, Endocarditis, HF    Patient Reported Findings:  Yes     No  [x]   []       Patient verifies current dosing regimen as listed  -  Warfarin 7.5 mg every Mon; 5 mg all other days  -  Patient has warfarin 5 mg tablets AND 7.5 mg tablets  []   [x]       S/Sx bleeding/bruising/swelling/SOB/CP  []   [x]       Blood in urine or stool  []   [x]       Procedures scheduled in the future at this time  [x]   []       Missed Dose  -  Patient held warfarin (1/31/25 to 2/2/25) for procedure on 2/3/2025  [x]   []       Extra Dose  -  Patient took 7.5 mg x 3 days after procedure (2/3/25 to 2/5/25)  []   [x]       Change in medications  -  Stopped amiodarone (~1 month ago)  -  Valsartan changed to Entresto   []   [x]       Change in health/diet/appetite  - Patient reports he avoids vitamin k containing foods  []   [x]       Change in alcohol use  []   [x]       Change in activity  []   [x]       Hospital admission  []   [x]       Emergency department visit  []   [x]       Other complaints    Clinical Outcomes:  Yes     No  []   [x]       Major bleeding event  []   [x]       Thromboembolic event    Duration of warfarin Therapy: 3 months  INR Range: 2-3        INR 2.2 is WITHIN the therapeutic range of 2-3  Recommend to continue 7.5 mg every Mon; 5 mg all other days  Will continue to monitor and check INR in 1-2 weeks.  Patient counseled on DDI  of warfarin-amiodarone  Given amiodarone was stopped, will monitor closely as patient likely to need warfarin dose adjustment; however, given the long half-life of amiodarone interaction effects likely to be delayed by weeks  AVS printed and reviewed with patient  Return to clinic: 3/24/25 at 08:45    Referral

## 2025-03-11 ENCOUNTER — APPOINTMENT (OUTPATIENT)
Dept: GENERAL RADIOLOGY | Age: 50
End: 2025-03-11
Payer: COMMERCIAL

## 2025-03-11 ENCOUNTER — HOSPITAL ENCOUNTER (EMERGENCY)
Age: 50
Discharge: HOME | End: 2025-03-11
Payer: COMMERCIAL

## 2025-03-11 VITALS
BODY MASS INDEX: 27.69 KG/M2 | HEART RATE: 93 BPM | SYSTOLIC BLOOD PRESSURE: 113 MMHG | TEMPERATURE: 99.5 F | OXYGEN SATURATION: 99 % | DIASTOLIC BLOOD PRESSURE: 81 MMHG | WEIGHT: 176.4 LBS | HEIGHT: 67 IN | RESPIRATION RATE: 16 BRPM

## 2025-03-11 DIAGNOSIS — Z51.81 SUBTHERAPEUTIC ANTICOAGULATION: ICD-10-CM

## 2025-03-11 DIAGNOSIS — Z79.01 SUBTHERAPEUTIC ANTICOAGULATION: ICD-10-CM

## 2025-03-11 DIAGNOSIS — R42 LIGHTHEADEDNESS: ICD-10-CM

## 2025-03-11 DIAGNOSIS — R00.0 TACHYCARDIA: Primary | ICD-10-CM

## 2025-03-11 LAB
ALBUMIN SERPL-MCNC: 4.2 G/DL (ref 3.4–5)
ALBUMIN/GLOB SERPL: 1 {RATIO} (ref 1.1–2.2)
ALP SERPL-CCNC: 79 U/L (ref 40–129)
ALT SERPL-CCNC: 39 U/L (ref 10–40)
ANION GAP SERPL CALCULATED.3IONS-SCNC: 17 MMOL/L (ref 3–16)
AST SERPL-CCNC: 32 U/L (ref 15–37)
BASOPHILS # BLD: 0.1 K/UL (ref 0–0.2)
BASOPHILS NFR BLD: 0.8 %
BILIRUB SERPL-MCNC: 0.6 MG/DL (ref 0–1)
BUN SERPL-MCNC: 18 MG/DL (ref 7–20)
CALCIUM SERPL-MCNC: 9.6 MG/DL (ref 8.3–10.6)
CHLORIDE SERPL-SCNC: 99 MMOL/L (ref 99–110)
CO2 SERPL-SCNC: 23 MMOL/L (ref 21–32)
CREAT SERPL-MCNC: 1.2 MG/DL (ref 0.9–1.3)
D-DIMER QUANTITATIVE: 0.39 UG/ML FEU (ref 0–0.6)
DEPRECATED RDW RBC AUTO: 15.7 % (ref 12.4–15.4)
EOSINOPHIL # BLD: 0.1 K/UL (ref 0–0.6)
EOSINOPHIL NFR BLD: 1.2 %
FLUAV RNA RESP QL NAA+PROBE: NOT DETECTED
FLUBV RNA RESP QL NAA+PROBE: NOT DETECTED
GFR SERPLBLD CREATININE-BSD FMLA CKD-EPI: 73 ML/MIN/{1.73_M2}
GLUCOSE SERPL-MCNC: 92 MG/DL (ref 70–99)
HCT VFR BLD AUTO: 40.6 % (ref 40.5–52.5)
HGB BLD-MCNC: 13.1 G/DL (ref 13.5–17.5)
INR PPP: 1.76 (ref 0.85–1.15)
LYMPHOCYTES # BLD: 1.7 K/UL (ref 1–5.1)
LYMPHOCYTES NFR BLD: 14.4 %
MCH RBC QN AUTO: 26.8 PG (ref 26–34)
MCHC RBC AUTO-ENTMCNC: 32.2 G/DL (ref 31–36)
MCV RBC AUTO: 83.1 FL (ref 80–100)
MONOCYTES # BLD: 1.2 K/UL (ref 0–1.3)
MONOCYTES NFR BLD: 10.2 %
NEUTROPHILS # BLD: 8.6 K/UL (ref 1.7–7.7)
NEUTROPHILS NFR BLD: 73.4 %
PLATELET # BLD AUTO: 302 K/UL (ref 135–450)
PMV BLD AUTO: 6.9 FL (ref 5–10.5)
POTASSIUM SERPL-SCNC: 3.7 MMOL/L (ref 3.5–5.1)
PROT SERPL-MCNC: 8.4 G/DL (ref 6.4–8.2)
PROTHROMBIN TIME: 20.6 SEC (ref 11.9–14.9)
RBC # BLD AUTO: 4.89 M/UL (ref 4.2–5.9)
SARS-COV-2 RNA RESP QL NAA+PROBE: NOT DETECTED
SODIUM SERPL-SCNC: 139 MMOL/L (ref 136–145)
TROPONIN, HIGH SENSITIVITY: 12 NG/L (ref 0–22)
TROPONIN, HIGH SENSITIVITY: 13 NG/L (ref 0–22)
WBC # BLD AUTO: 11.7 K/UL (ref 4–11)

## 2025-03-11 PROCEDURE — 93005 ELECTROCARDIOGRAM TRACING: CPT | Performed by: STUDENT IN AN ORGANIZED HEALTH CARE EDUCATION/TRAINING PROGRAM

## 2025-03-11 PROCEDURE — 36415 COLL VENOUS BLD VENIPUNCTURE: CPT

## 2025-03-11 PROCEDURE — 85379 FIBRIN DEGRADATION QUANT: CPT

## 2025-03-11 PROCEDURE — 71045 X-RAY EXAM CHEST 1 VIEW: CPT

## 2025-03-11 PROCEDURE — 80053 COMPREHEN METABOLIC PANEL: CPT

## 2025-03-11 PROCEDURE — 99285 EMERGENCY DEPT VISIT HI MDM: CPT

## 2025-03-11 PROCEDURE — 85610 PROTHROMBIN TIME: CPT

## 2025-03-11 PROCEDURE — 85025 COMPLETE CBC W/AUTO DIFF WBC: CPT

## 2025-03-11 PROCEDURE — 87636 SARSCOV2 & INF A&B AMP PRB: CPT

## 2025-03-11 PROCEDURE — 84484 ASSAY OF TROPONIN QUANT: CPT

## 2025-03-11 RX ORDER — ALPRAZOLAM 0.5 MG
0.5 TABLET ORAL NIGHTLY PRN
COMMUNITY

## 2025-03-11 ASSESSMENT — PAIN SCALES - GENERAL: PAINLEVEL_OUTOF10: 2

## 2025-03-11 ASSESSMENT — PAIN - FUNCTIONAL ASSESSMENT: PAIN_FUNCTIONAL_ASSESSMENT: 0-10

## 2025-03-11 ASSESSMENT — LIFESTYLE VARIABLES
HOW MANY STANDARD DRINKS CONTAINING ALCOHOL DO YOU HAVE ON A TYPICAL DAY: PATIENT DOES NOT DRINK
HOW OFTEN DO YOU HAVE A DRINK CONTAINING ALCOHOL: NEVER

## 2025-03-11 NOTE — ED PROVIDER NOTES
The Ekg interpreted by me shows  sinus tachycardia, [id number]    Axis is   Normal  QTc is  within an acceptable range  Right bundle branch block.      ST Segments: nonspecific changes  Sinus tachycardia, right bundle branch block are new, nonspecific T wave abnormalities are more prominent compared to previous performed 10/23/2024         Elayne Braxton MD  03/11/25 0928    
following medications:  Medications - No data to display          Is this patient to be included in the SEP-1 core measure? No   Exclusion criteria - the patient is NOT to be included for SEP-1 Core Measure due to:  2+ SIRS criteria are not met      CONSULTS: (Who and What was discussed)  None  Discussion with Other Profesionals : None    Social Determinants : None    Records Reviewed : Inpatient Notes      Ddx:   Pulmonary embolism, arrhythmia, dehydration, metabolic disturbance, COVID-19, influenza, pneumonia    Medical Decision Making:   This is a 49-year-old male presenting to the emergency department with complaints of an episode of lightheadedness with subsequent hypotension and tachycardia.  He is not having any chest pain shortness of breath nausea vomiting his vital signs in triage are reassuring aside from having some mild tachycardia.  Seems to be very stable and asymptomatic otherwise.  Considering his history will evaluate for pulmonary embolism, cardiac etiology.    Workup reassuring as patient had only mild leukocytosis of 11.7.  No profound anemia.  Panel with no electrolyte disturbance or hepatorenal dysfunction.  INR subtherapeutic at 1.76 however, patient has not had his warfarin for the day.  D-dimer not elevated at 0.39.  Troponins stable and not elevated.  COVID and flu testing negative.  EKG nonischemic.  Patient was provided reassurance and given that his tachycardia had improved will discharge home at this time with recommendation to follow-up with his cardiologist.      I am the Primary Clinician of Record.    FINAL IMPRESSION      1. Tachycardia    2. Lightheadedness    3. Subtherapeutic anticoagulation          DISPOSITION/PLAN     DISPOSITION Decision to Discharge    PATIENT REFERRED TO:  John Riojas MD  [address] [place] [address]  Heart Failure  [facility] [id number] [phone]    Schedule an appointment as soon as possible for a visit in 2 days        DISCHARGE

## 2025-03-12 LAB
EKG ATRIAL RATE: 113 BPM
EKG DIAGNOSIS: NORMAL
EKG P AXIS: -8 DEGREES
EKG P-R INTERVAL: 136 MS
EKG Q-T INTERVAL: 364 MS
EKG QRS DURATION: 148 MS
EKG QTC CALCULATION (BAZETT): 499 MS
EKG R AXIS: 74 DEGREES
EKG T AXIS: 48 DEGREES
EKG VENTRICULAR RATE: 113 BPM

## 2025-03-12 PROCEDURE — 93010 ELECTROCARDIOGRAM REPORT: CPT | Performed by: INTERNAL MEDICINE

## 2025-03-18 SDOH — ECONOMIC STABILITY: FOOD INSECURITY: WITHIN THE PAST 12 MONTHS, THE FOOD YOU BOUGHT JUST DIDN'T LAST AND YOU DIDN'T HAVE MONEY TO GET MORE.: NEVER TRUE

## 2025-03-18 SDOH — ECONOMIC STABILITY: INCOME INSECURITY: IN THE LAST 12 MONTHS, WAS THERE A TIME WHEN YOU WERE NOT ABLE TO PAY THE MORTGAGE OR RENT ON TIME?: NO

## 2025-03-18 SDOH — ECONOMIC STABILITY: FOOD INSECURITY: WITHIN THE PAST 12 MONTHS, YOU WORRIED THAT YOUR FOOD WOULD RUN OUT BEFORE YOU GOT MONEY TO BUY MORE.: NEVER TRUE

## 2025-03-18 SDOH — ECONOMIC STABILITY: TRANSPORTATION INSECURITY
IN THE PAST 12 MONTHS, HAS THE LACK OF TRANSPORTATION KEPT YOU FROM MEDICAL APPOINTMENTS OR FROM GETTING MEDICATIONS?: NO

## 2025-03-18 SDOH — ECONOMIC STABILITY: TRANSPORTATION INSECURITY
IN THE PAST 12 MONTHS, HAS LACK OF TRANSPORTATION KEPT YOU FROM MEETINGS, WORK, OR FROM GETTING THINGS NEEDED FOR DAILY LIVING?: NO

## 2025-03-18 ASSESSMENT — PATIENT HEALTH QUESTIONNAIRE - PHQ9
2. FEELING DOWN, DEPRESSED OR HOPELESS: NOT AT ALL
SUM OF ALL RESPONSES TO PHQ QUESTIONS 1-9: 0
2. FEELING DOWN, DEPRESSED OR HOPELESS: NOT AT ALL
1. LITTLE INTEREST OR PLEASURE IN DOING THINGS: NOT AT ALL
SUM OF ALL RESPONSES TO PHQ9 QUESTIONS 1 & 2: 0
1. LITTLE INTEREST OR PLEASURE IN DOING THINGS: NOT AT ALL

## 2025-03-20 ENCOUNTER — OFFICE VISIT (OUTPATIENT)
Dept: FAMILY MEDICINE CLINIC | Age: 50
End: 2025-03-20

## 2025-03-20 VITALS
HEART RATE: 92 BPM | RESPIRATION RATE: 16 BRPM | BODY MASS INDEX: 28.41 KG/M2 | DIASTOLIC BLOOD PRESSURE: 82 MMHG | SYSTOLIC BLOOD PRESSURE: 130 MMHG | WEIGHT: 181 LBS | HEIGHT: 67 IN | OXYGEN SATURATION: 98 %

## 2025-03-20 DIAGNOSIS — Z95.810 S/P ICD (INTERNAL CARDIAC DEFIBRILLATOR) PROCEDURE: ICD-10-CM

## 2025-03-20 DIAGNOSIS — F41.9 ANXIETY: ICD-10-CM

## 2025-03-20 DIAGNOSIS — I50.22 HEART FAILURE WITH MILDLY REDUCED EJECTION FRACTION (HFMREF) (HCC): Primary | ICD-10-CM

## 2025-03-20 DIAGNOSIS — Z98.890 S/P MVR (MITRAL VALVE REPAIR): ICD-10-CM

## 2025-03-20 DIAGNOSIS — I10 PRIMARY HYPERTENSION: ICD-10-CM

## 2025-03-20 PROBLEM — R57.0 CARDIOGENIC SHOCK (HCC): Status: RESOLVED | Noted: 2024-10-22 | Resolved: 2025-03-20

## 2025-03-20 RX ORDER — LINEZOLID 600 MG/1
TABLET, FILM COATED ORAL
COMMUNITY
Start: 2024-12-18

## 2025-03-20 ASSESSMENT — ENCOUNTER SYMPTOMS
ABDOMINAL PAIN: 0
NAUSEA: 0
COUGH: 0
CONSTIPATION: 0
DIARRHEA: 0
SORE THROAT: 0
SHORTNESS OF BREATH: 0

## 2025-03-20 NOTE — PROGRESS NOTES
3/20/2025    History of Present Illness      This is a 49 y.o. male   Chief Complaint   Patient presents with    Sleep Apnea     Having intermittent periods of sleeping and waking up. Only sleeping for periods at a time    .    When he made appointment was having sleeping difficulty, this has resolved. He has melatonin for as needed, but hasn't used since last week.     ER for tachycardia and lightheadedness - dehydrated, improved with fluids. Doing well.     Discussed hospitalization since last seen - now has ICD, s/p cardiac arrest spent a month at   Returns to work on Sunday. First since October.   Has gained some weight with hospitalization and being off work. Hope this will improve once he returns to normal activity.          Patient Active Problem List   Diagnosis    Fatty liver    Tachycardia    Primary hypertension    Anxiety    Bigeminy    Mitral regurgitation    Severe mitral regurgitation    Mitral valve disorder    History of acute myocardial infarction    Junctional bradycardia    PAF (paroxysmal atrial fibrillation) ([facility])    Torsades de pointes ([facility])    Acute venous embolism and thrombosis of deep vessels of proximal lower extremity ([facility])    S/P MVR (mitral valve repair)    Dilated cardiomyopathy ([facility])    Pulmonary embolism ([facility])    Heart failure with mildly reduced ejection fraction (HFmrEF) ([facility])    S/P ICD (internal cardiac defibrillator) procedure       Current Outpatient Medications   Medication Sig Dispense Refill    linezolid (ZYVOX) 600 MG tablet       ALPRAZolam (XANAX) 0.5 MG tablet Take 1 tablet by mouth nightly as needed for Sleep. Max Daily Amount: 0.5 mg      sacubitril-valsartan (ENTRESTO) 24-26 MG per tablet Take 1 tablet by mouth 2 times daily      metoprolol succinate (TOPROL XL) 25 MG extended release tablet Take 1 tablet by mouth daily 90 tablet 3    warfarin (COUMADIN) 5 MG tablet Take 1 tablet by mouth daily 30 tablet 11    eplerenone (INSPRA) 50 MG tablet Take 1 tablet by mouth

## 2025-03-24 ENCOUNTER — ANTI-COAG VISIT (OUTPATIENT)
Dept: PHARMACY | Age: 50
End: 2025-03-24
Payer: COMMERCIAL

## 2025-03-24 DIAGNOSIS — I26.99 PULMONARY EMBOLISM, OTHER, UNSPECIFIED CHRONICITY, UNSPECIFIED WHETHER ACUTE COR PULMONALE PRESENT (HCC): Primary | ICD-10-CM

## 2025-03-24 LAB
INTERNATIONAL NORMALIZATION RATIO, POC: 2.7
PROTHROMBIN TIME, POC: 0

## 2025-03-24 PROCEDURE — 85610 PROTHROMBIN TIME: CPT

## 2025-03-24 PROCEDURE — 99211 OFF/OP EST MAY X REQ PHY/QHP: CPT

## 2025-03-24 NOTE — PROGRESS NOTES
Mr. Tomer Amaya is a 49 y.o. y/o male with history of PE who presents today for anticoagulation monitoring and adjustment. Patient was being managed by  anticoagulation clinic, but transferred to [facility] Anticoagulation clinic as it is closer.    Pertinent PMH: HTN, Afib, Endocarditis, HF    Patient Reported Findings:  Yes     No  [x]   []       Patient verifies current dosing regimen as listed  -  Warfarin 7.5 mg every Mon; 5 mg all other days  -  Patient has warfarin 5 mg tablets AND 7.5 mg tablets  []   [x]       S/Sx bleeding/bruising/swelling/SOB/CP  []   [x]       Blood in urine or stool  []   [x]       Procedures scheduled in the future at this time  []   [x]       Missed Dose  []   [x]       Extra Dose  []   [x]       Change in medications  -  Stopped amiodarone (beginning of February sometime)  -  Valsartan changed to Entresto   []   [x]       Change in health/diet/appetite  - Patient reports he avoids vitamin k containing foods  []   [x]       Change in alcohol use  []   [x]       Change in activity  []   [x]       Hospital admission  []   [x]       Emergency department visit  []   [x]       Other complaints    - Patient also reports he has recently returned back to work    Clinical Outcomes:  Yes     No  []   [x]       Major bleeding event  []   [x]       Thromboembolic event    Duration of warfarin Therapy: 3 months  INR Range: 2-3        INR 2.7 is WITHIN the therapeutic range of 2-3  Recommend to continue 7.5 mg every Mon; 5 mg all other days  Will continue to monitor and check INR in 4 weeks.  AVS printed and reviewed with patient  Return to clinic: 4/21/2025    Referral Provider: Dr. Antonio Jackson  Referral Date: 12/17/2024  CPA: Signed    Lawrence Grewal, PharmANTOINE 3/24/2025 8:28 AM          For Pharmacy Admin Tracking Only  Intervention Detail:   Total # of Interventions Recommended: 0  Total # of Interventions Accepted: 0  Time Spent (min): 15

## 2025-04-21 ENCOUNTER — ANTI-COAG VISIT (OUTPATIENT)
Dept: PHARMACY | Age: 50
End: 2025-04-21
Payer: COMMERCIAL

## 2025-04-21 DIAGNOSIS — I26.99 PULMONARY EMBOLISM, OTHER, UNSPECIFIED CHRONICITY, UNSPECIFIED WHETHER ACUTE COR PULMONALE PRESENT (HCC): Primary | ICD-10-CM

## 2025-04-21 LAB
INTERNATIONAL NORMALIZATION RATIO, POC: 2.1
PROTHROMBIN TIME, POC: 0

## 2025-04-21 PROCEDURE — 99211 OFF/OP EST MAY X REQ PHY/QHP: CPT

## 2025-04-21 PROCEDURE — 85610 PROTHROMBIN TIME: CPT

## 2025-04-21 NOTE — PROGRESS NOTES
Mr. Tomer Amaya is a 49 y.o. y/o male with history of PE who presents today for anticoagulation monitoring and adjustment. Patient was being managed by  anticoagulation clinic, but transferred to [facility] Anticoagulation clinic as it is closer.    Pertinent PMH: HTN, Afib, Endocarditis, HF    Patient Reported Findings:  Yes     No  [x]   []       Patient verifies current dosing regimen as listed  -  Warfarin 7.5 mg every Mon; 5 mg all other days  -  Patient has warfarin 5 mg tablets AND 7.5 mg tablets  []   [x]       S/Sx bleeding/bruising/swelling/SOB/CP  []   [x]       Blood in urine or stool  []   [x]       Procedures scheduled in the future at this time  []   [x]       Missed Dose  []   [x]       Extra Dose  []   [x]       Change in medications  -  Stopped amiodarone (beginning of February sometime)  -  Valsartan changed to Entresto   []   [x]       Change in health/diet/appetite  - Patient reports he avoids vitamin k containing foods  []   [x]       Change in alcohol use  []   [x]       Change in activity  []   [x]       Hospital admission  []   [x]       Emergency department visit  []   [x]       Other complaints  - Patient also reports he has recently returned back to work    Clinical Outcomes:  Yes     No  []   [x]       Major bleeding event  []   [x]       Thromboembolic event    Duration of warfarin Therapy: 3 months  INR Range: 2-3        INR 2.1 is WITHIN the therapeutic range of 2-3  Recommend to continue 7.5 mg every Mon; 5 mg all other days  Patient has now been on warfarin for greater than 3 months. Patient wants to discuss with Dr. Jackson prior to stopping anticoagulation.   Will continue to monitor and check INR in 4 weeks.  AVS printed and reviewed with patient  Return to clinic: 5/19/2025    Referral Provider: Dr. Antonio Jackson  Referral Date: 12/17/2024  CPA: Signed    Lawrence Grewal PharmD 4/21/2025 10:03 AM            For Pharmacy Admin Tracking Only  Intervention Detail:   Total #

## 2025-05-19 ENCOUNTER — ANTI-COAG VISIT (OUTPATIENT)
Dept: PHARMACY | Age: 50
End: 2025-05-19
Payer: COMMERCIAL

## 2025-05-19 DIAGNOSIS — I26.99 PULMONARY EMBOLISM, OTHER, UNSPECIFIED CHRONICITY, UNSPECIFIED WHETHER ACUTE COR PULMONALE PRESENT (HCC): Primary | ICD-10-CM

## 2025-05-19 LAB
INTERNATIONAL NORMALIZATION RATIO, POC: 2
PROTHROMBIN TIME, POC: 0

## 2025-05-19 PROCEDURE — 85610 PROTHROMBIN TIME: CPT

## 2025-05-19 PROCEDURE — 99211 OFF/OP EST MAY X REQ PHY/QHP: CPT

## 2025-05-19 NOTE — PROGRESS NOTES
Mr. Tomer Amaya is a 49 y.o. y/o male with history of PE who presents today for anticoagulation monitoring and adjustment. Patient was being managed by  anticoagulation clinic, but transferred to [facility] Anticoagulation clinic as it is closer.    Pertinent PMH: HTN, Afib, Endocarditis, HF    Patient Reported Findings:  Yes     No  [x]   []       Patient verifies current dosing regimen as listed  -  Warfarin 7.5 mg every Mon; 5 mg all other days  -  Patient has warfarin 5 mg tablets AND 7.5 mg tablets  []   [x]       S/Sx bleeding/bruising/swelling/SOB/CP  []   [x]       Blood in urine or stool  []   [x]       Procedures scheduled in the future at this time  []   [x]       Missed Dose  []   [x]       Extra Dose  []   [x]       Change in medications  -  Stopped amiodarone (beginning of February sometime)  -  Valsartan changed to Entresto   []   [x]       Change in health/diet/appetite  - Patient reports he avoids vitamin k containing foods  []   [x]       Change in alcohol use  []   [x]       Change in activity  []   [x]       Hospital admission  []   [x]       Emergency department visit  []   [x]       Other complaints  - Patient also reports he has recently returned back to work    Clinical Outcomes:  Yes     No  []   [x]       Major bleeding event  []   [x]       Thromboembolic event    Duration of warfarin Therapy: 3 months  INR Range: 2-3      Patient has not heard from Dr. Jackson about stopping warfarin. Patient reports he sees Dr. Jackson on 6/17/2025 and will discuss with him at that time    INR 2.0 is WITHIN the therapeutic range of 2-3  Recommend to continue 7.5 mg every Mon; 5 mg all other days  Will continue to monitor and check INR in 5 weeks.  AVS printed and reviewed with patient  Return to clinic: 6/23/2025    Referral Provider: Dr. Antonio Jackson  Referral Date: 12/17/2024  CPA: Signed    Lawrence Grewal PharmANTOINE 5/19/2025 9:22 AM            For Pharmacy Admin Tracking Only  Intervention

## 2025-06-10 NOTE — PROGRESS NOTES
silhouettes are within normal limits.    Echo 8/25/11:  Left ventricle: The cavity size was normal. Wall thickness was normal. Systolic function was normal. The estimated ejection fraction was in the range of 55% to 60%. Wall motion was normal; there were no regional wall motion abnormalities. Left ventricular diastolic function parameters were normal. Mitral valve: Mild regurgitation. Tricuspid valve: Mild regurgitation. Pulmonic valve: Mild regurgitation.     Echo 12/11/23  Normal left ventricular size, wall thickness, and systolic function with an estimated ejection fraction of 55-60%. No regional wall motion abnormalities are seen.  Normal diastolic function.  Normal RV size and systolic function.  Mitral valve leaflets appear thickened but open adequately.  There is prolapse of both mitral valve leaflets.  Moderate-severe mitral regurgitation. The degree of mitral regurgitation is difficult to fully quantify on this study and would recommend HENRIETTA for further evaluation.  Mild tricuspid regurgitation.  Estimated pulmonary artery systolic pressure is 24 mmHg assuming a right atrial pressure of 3 mmHg.    HENRIETTA 1/22/24  HENRIETTA performed by Dr. Penn.  Ejection fraction is visually estimated at 55%.  There appears to be prolapse of both mitral valve leaflets.  There are multiple regurgitant jets present which makes it somewhat  difficult to accurately quantify the severity of mitral regurgitation present.  EROA calculated by PISA at 0.38 cm2 with a regurgitant volume of 51 ml.  There appears to be mild pulmonary vein systolic flow reversal.  Overall findings are consistent with moderate-severe mitral regurgitation.  There is no evidence of mass or thrombus in the left atrium or appendage.  A bubble study was performed and shows evidence of a delayed right-to-left shunt, suggestive of an intrapulmonary shunt. Can consider cardiac MRI to help further quantify severity of mitral regurgitation.    Event monitor 
coumadin (Warfarin)- completed 6 months of AC post   Order limited echocardiogram ~ assessLV and RV function, s/p mitral valve repair  Order fasting lipid panel   Continue to follow with heart failure team/EP at   Great job on not smoking. Continue to avoid as this is risk factor for heart attack, stroke, and/or cancer.      Follow up 4 months      Scribe's attestation:  This note was scribed in the presence of Dr.Andrew Jackson by Eun Owens RN      I, Dr. Antonio Jackson, personally performed the services described in this documentation, as scribed by the above signed scribe in my presence. It is both accurate and complete to my knowledge. I agree with the details independently gathered by the clinical support staff, while the remaining scribed note accurately describes my personal service to the patient. Please note that portions of this note may have been completed with a voice recognition program. Efforts were made to edit the dictations but occasionally words are mis-transcribed.      I will address the patient's cardiac risk factors and adjusted pharmacologic treatment as needed. In addition, I have reinforced the need for patient directed risk factor modification.  All questions and concerns were addressed to the patient/family. Alternatives to my treatment were discussed.     Thank you for allowing us to participate in the care of Tomer Amaya. Please call me with any questions [phone].      Antonio Jackson, DO  Cardiovascular Disease  [facility]  [phone] [place] Office  [phone] [place] Office

## 2025-06-14 DIAGNOSIS — I42.9 CARDIOMYOPATHY, UNSPECIFIED TYPE (HCC): ICD-10-CM

## 2025-06-16 RX ORDER — EMPAGLIFLOZIN 10 MG/1
10 TABLET, FILM COATED ORAL DAILY
Qty: 90 TABLET | Refills: 3 | Status: SHIPPED | OUTPATIENT
Start: 2025-06-16 | End: 2025-06-17 | Stop reason: SDUPTHER

## 2025-06-16 NOTE — TELEPHONE ENCOUNTER
Lab Results   Component Value Date     03/11/2025    K 3.7 03/11/2025    CL 99 03/11/2025    CO2 23 03/11/2025    BUN 18 03/11/2025    CREATININE 1.2 03/11/2025    GLUCOSE 92 03/11/2025    CALCIUM 9.6 03/11/2025    BILITOT 0.6 03/11/2025    ALKPHOS 79 03/11/2025    AST 32 03/11/2025    ALT 39 03/11/2025    LABGLOM 73 03/11/2025    GFRAA >60 04/11/2017    AGRATIO 1.0 (L) 03/11/2025    GLOB 3.3 04/11/2017         Patient last seen on 2/11/25. Advised to follow up 4 months. Next appointment 6/17/25.

## 2025-06-17 ENCOUNTER — OFFICE VISIT (OUTPATIENT)
Dept: CARDIOLOGY CLINIC | Age: 50
End: 2025-06-17
Payer: COMMERCIAL

## 2025-06-17 ENCOUNTER — TELEPHONE (OUTPATIENT)
Dept: CARDIOLOGY CLINIC | Age: 50
End: 2025-06-17

## 2025-06-17 VITALS
SYSTOLIC BLOOD PRESSURE: 112 MMHG | DIASTOLIC BLOOD PRESSURE: 74 MMHG | WEIGHT: 171 LBS | BODY MASS INDEX: 26.84 KG/M2 | HEIGHT: 67 IN | OXYGEN SATURATION: 100 % | HEART RATE: 88 BPM

## 2025-06-17 DIAGNOSIS — I42.9 CARDIOMYOPATHY, UNSPECIFIED TYPE (HCC): ICD-10-CM

## 2025-06-17 DIAGNOSIS — I50.22 CHRONIC HFREF (HEART FAILURE WITH REDUCED EJECTION FRACTION) (HCC): ICD-10-CM

## 2025-06-17 DIAGNOSIS — I50.22 HEART FAILURE WITH MILDLY REDUCED EJECTION FRACTION (HFMREF) (HCC): ICD-10-CM

## 2025-06-17 DIAGNOSIS — I42.0 DILATED CARDIOMYOPATHY (HCC): ICD-10-CM

## 2025-06-17 DIAGNOSIS — Z95.810 S/P ICD (INTERNAL CARDIAC DEFIBRILLATOR) PROCEDURE: ICD-10-CM

## 2025-06-17 DIAGNOSIS — E78.5 HYPERLIPIDEMIA, UNSPECIFIED HYPERLIPIDEMIA TYPE: ICD-10-CM

## 2025-06-17 DIAGNOSIS — Z98.890 S/P MVR (MITRAL VALVE REPAIR): Primary | ICD-10-CM

## 2025-06-17 DIAGNOSIS — I10 PRIMARY HYPERTENSION: ICD-10-CM

## 2025-06-17 PROCEDURE — 3074F SYST BP LT 130 MM HG: CPT | Performed by: STUDENT IN AN ORGANIZED HEALTH CARE EDUCATION/TRAINING PROGRAM

## 2025-06-17 PROCEDURE — 99214 OFFICE O/P EST MOD 30 MIN: CPT | Performed by: STUDENT IN AN ORGANIZED HEALTH CARE EDUCATION/TRAINING PROGRAM

## 2025-06-17 PROCEDURE — G2211 COMPLEX E/M VISIT ADD ON: HCPCS | Performed by: STUDENT IN AN ORGANIZED HEALTH CARE EDUCATION/TRAINING PROGRAM

## 2025-06-17 PROCEDURE — 3078F DIAST BP <80 MM HG: CPT | Performed by: STUDENT IN AN ORGANIZED HEALTH CARE EDUCATION/TRAINING PROGRAM

## 2025-06-17 RX ORDER — WARFARIN SODIUM 5 MG/1
5 TABLET ORAL DAILY
Qty: 30 TABLET | Refills: 11 | Status: CANCELLED | OUTPATIENT
Start: 2025-06-17

## 2025-06-17 RX ORDER — SACUBITRIL AND VALSARTAN 24; 26 MG/1; MG/1
1 TABLET, FILM COATED ORAL 2 TIMES DAILY
Qty: 180 TABLET | Refills: 1 | Status: SHIPPED | OUTPATIENT
Start: 2025-06-17

## 2025-06-17 RX ORDER — ASPIRIN 81 MG/1
81 TABLET ORAL DAILY
Qty: 30 TABLET | Refills: 2 | Status: CANCELLED | OUTPATIENT
Start: 2025-06-17

## 2025-06-17 RX ORDER — FUROSEMIDE 40 MG/1
40 TABLET ORAL DAILY
Qty: 90 TABLET | Refills: 2 | Status: SHIPPED | OUTPATIENT
Start: 2025-06-17

## 2025-06-17 RX ORDER — METOPROLOL SUCCINATE 25 MG/1
25 TABLET, EXTENDED RELEASE ORAL DAILY
Qty: 90 TABLET | Refills: 3 | Status: SHIPPED | OUTPATIENT
Start: 2025-06-17

## 2025-06-17 RX ORDER — EPLERENONE 50 MG/1
50 TABLET ORAL DAILY
Qty: 90 TABLET | Refills: 3 | Status: SHIPPED | OUTPATIENT
Start: 2025-06-17

## 2025-06-17 NOTE — PATIENT INSTRUCTIONS
PLAN  Continue aspirin 81mg daily, metoprolol 25mg daily, jardiance 10mg daily, eplerenone 50mg daily, lasix 40mg daily, and entresto 24-26 mg tablet twice a day   Discontinue coumadin (Warfarin)  Order limited echocardiogram ~ assessLV and RV function, s/p MVR  Order fasting lipid panel   Continue to follow with heart failure team   Great job on not smoking. Continue to avoid as this is risk factor for heart attack, stroke, and/or cancer.      Follow up 4 months

## 2025-06-18 ENCOUNTER — ANTI-COAG VISIT (OUTPATIENT)
Dept: PHARMACY | Age: 50
End: 2025-06-18

## 2025-06-18 DIAGNOSIS — I26.99 PULMONARY EMBOLISM, OTHER, UNSPECIFIED CHRONICITY, UNSPECIFIED WHETHER ACUTE COR PULMONALE PRESENT (HCC): Primary | ICD-10-CM

## 2025-06-18 NOTE — PROGRESS NOTES
Warfarin has been discontinued.  Appointment canceled and anticoagulation episode has been resolved.  Patient dismissed from [facility] anticoagulation clinic.    Lawrence Grewal PharmD 6/18/2025 8:31 AM

## 2025-06-24 ASSESSMENT — PATIENT HEALTH QUESTIONNAIRE - PHQ9
SUM OF ALL RESPONSES TO PHQ QUESTIONS 1-9: 0
2. FEELING DOWN, DEPRESSED OR HOPELESS: NOT AT ALL
1. LITTLE INTEREST OR PLEASURE IN DOING THINGS: NOT AT ALL
SUM OF ALL RESPONSES TO PHQ QUESTIONS 1-9: 0

## 2025-06-24 ASSESSMENT — ANXIETY QUESTIONNAIRES
7. FEELING AFRAID AS IF SOMETHING AWFUL MIGHT HAPPEN: NOT AT ALL
1. FEELING NERVOUS, ANXIOUS, OR ON EDGE: NOT AT ALL
6. BECOMING EASILY ANNOYED OR IRRITABLE: NOT AT ALL
2. NOT BEING ABLE TO STOP OR CONTROL WORRYING: NOT AT ALL
5. BEING SO RESTLESS THAT IT IS HARD TO SIT STILL: NOT AT ALL
GAD7 TOTAL SCORE: 0
4. TROUBLE RELAXING: NOT AT ALL
3. WORRYING TOO MUCH ABOUT DIFFERENT THINGS: NOT AT ALL

## 2025-06-27 ENCOUNTER — PATIENT MESSAGE (OUTPATIENT)
Dept: FAMILY MEDICINE CLINIC | Age: 50
End: 2025-06-27

## 2025-07-15 ASSESSMENT — ANXIETY QUESTIONNAIRES
2. NOT BEING ABLE TO STOP OR CONTROL WORRYING: NOT AT ALL
6. BECOMING EASILY ANNOYED OR IRRITABLE: NOT AT ALL
3. WORRYING TOO MUCH ABOUT DIFFERENT THINGS: NOT AT ALL
2. NOT BEING ABLE TO STOP OR CONTROL WORRYING: NOT AT ALL
7. FEELING AFRAID AS IF SOMETHING AWFUL MIGHT HAPPEN: NOT AT ALL
3. WORRYING TOO MUCH ABOUT DIFFERENT THINGS: NOT AT ALL
GAD7 TOTAL SCORE: 0
1. FEELING NERVOUS, ANXIOUS, OR ON EDGE: NOT AT ALL
5. BEING SO RESTLESS THAT IT IS HARD TO SIT STILL: NOT AT ALL
4. TROUBLE RELAXING: NOT AT ALL
1. FEELING NERVOUS, ANXIOUS, OR ON EDGE: NOT AT ALL
4. TROUBLE RELAXING: NOT AT ALL
5. BEING SO RESTLESS THAT IT IS HARD TO SIT STILL: NOT AT ALL
6. BECOMING EASILY ANNOYED OR IRRITABLE: NOT AT ALL
7. FEELING AFRAID AS IF SOMETHING AWFUL MIGHT HAPPEN: NOT AT ALL

## 2025-07-15 ASSESSMENT — PATIENT HEALTH QUESTIONNAIRE - PHQ9
SUM OF ALL RESPONSES TO PHQ QUESTIONS 1-9: 0
1. LITTLE INTEREST OR PLEASURE IN DOING THINGS: NOT AT ALL
SUM OF ALL RESPONSES TO PHQ QUESTIONS 1-9: 0
2. FEELING DOWN, DEPRESSED OR HOPELESS: NOT AT ALL
2. FEELING DOWN, DEPRESSED OR HOPELESS: NOT AT ALL
SUM OF ALL RESPONSES TO PHQ9 QUESTIONS 1 & 2: 0
SUM OF ALL RESPONSES TO PHQ QUESTIONS 1-9: 0
SUM OF ALL RESPONSES TO PHQ QUESTIONS 1-9: 0
1. LITTLE INTEREST OR PLEASURE IN DOING THINGS: NOT AT ALL

## 2025-07-18 ENCOUNTER — OFFICE VISIT (OUTPATIENT)
Dept: FAMILY MEDICINE CLINIC | Age: 50
End: 2025-07-18
Payer: COMMERCIAL

## 2025-07-18 VITALS
WEIGHT: 179.2 LBS | SYSTOLIC BLOOD PRESSURE: 120 MMHG | OXYGEN SATURATION: 97 % | RESPIRATION RATE: 15 BRPM | HEART RATE: 73 BPM | BODY MASS INDEX: 28.07 KG/M2 | TEMPERATURE: 97.7 F | DIASTOLIC BLOOD PRESSURE: 84 MMHG

## 2025-07-18 DIAGNOSIS — Z00.00 ROUTINE MEDICAL EXAM: ICD-10-CM

## 2025-07-18 DIAGNOSIS — Z00.00 ROUTINE MEDICAL EXAM: Primary | ICD-10-CM

## 2025-07-18 DIAGNOSIS — I50.22 HEART FAILURE WITH MILDLY REDUCED EJECTION FRACTION (HFMREF) (HCC): ICD-10-CM

## 2025-07-18 DIAGNOSIS — Z95.810 S/P ICD (INTERNAL CARDIAC DEFIBRILLATOR) PROCEDURE: ICD-10-CM

## 2025-07-18 DIAGNOSIS — F41.9 ANXIETY: ICD-10-CM

## 2025-07-18 DIAGNOSIS — Z98.890 S/P MVR (MITRAL VALVE REPAIR): ICD-10-CM

## 2025-07-18 DIAGNOSIS — Z12.11 COLON CANCER SCREENING: ICD-10-CM

## 2025-07-18 PROCEDURE — 99396 PREV VISIT EST AGE 40-64: CPT | Performed by: SURGERY

## 2025-07-18 PROCEDURE — 3074F SYST BP LT 130 MM HG: CPT | Performed by: SURGERY

## 2025-07-18 PROCEDURE — 3079F DIAST BP 80-89 MM HG: CPT | Performed by: SURGERY

## 2025-07-18 NOTE — PROGRESS NOTES
7/18/2025    History of Present Illness  The patient presents for arrhythmia, sleep issues, and health maintenance.    He recently consulted a cardiologist due to arrhythmia and underwent an echocardiogram and other monitoring tests, all of which yielded normal results. His anticoagulation therapy was discontinued, and no other medication adjustments were made. He has not yet scheduled a follow-up echocardiogram but plans to do so before his next cardiology appointment in October. He reports no episodes of rapid heart rate since March 2025 when he experienced a brief period of tachycardia with heart rates in the 100s. This episode resolved after his visit to the ER. He also reports no leg swelling and mentions that he has been using compression stockings during his vacation week without any issues. He plans to resume wearing them when he returns to work on Sunday.    He had sleep issues, but they have improved.    He is doing well at work.    This is a 50 y.o. male   Chief Complaint   Patient presents with    Follow-up    Sleep Problem   .    Saw cardio 6/17 - completed 6 m anticoag for provoked DVT, cont other med for HF no changes, rpt ECHO - scheduled?         Patient Active Problem List   Diagnosis    Fatty liver    Tachycardia    Primary hypertension    Anxiety    Bigeminy    Mitral regurgitation    Severe mitral regurgitation    Mitral valve disorder    History of acute myocardial infarction    Junctional bradycardia    PAF (paroxysmal atrial fibrillation) ([facility])    Torsades de pointes ([facility])    Acute venous embolism and thrombosis of deep vessels of proximal lower extremity ([facility])    S/P MVR (mitral valve repair)    Dilated cardiomyopathy ([facility])    Pulmonary embolism ([facility])    Heart failure with mildly reduced ejection fraction (HFmrEF) ([facility])    S/P ICD (internal cardiac defibrillator) procedure       Current Outpatient Medications   Medication Sig Dispense Refill    eplerenone (INSPRA) 50 MG tablet Take 1 tablet

## 2025-07-19 LAB
ALBUMIN SERPL-MCNC: 4.4 G/DL (ref 3.4–5)
ALBUMIN/GLOB SERPL: 1.4 {RATIO} (ref 1.1–2.2)
ALP SERPL-CCNC: 72 U/L (ref 40–129)
ALT SERPL-CCNC: 35 U/L (ref 10–40)
ANION GAP SERPL CALCULATED.3IONS-SCNC: 20 MMOL/L (ref 3–16)
AST SERPL-CCNC: 29 U/L (ref 15–37)
BASOPHILS # BLD: 0 K/UL (ref 0–0.2)
BASOPHILS NFR BLD: 0 %
BILIRUB SERPL-MCNC: 0.3 MG/DL (ref 0–1)
BUN SERPL-MCNC: 20 MG/DL (ref 7–20)
BURR CELLS BLD QL SMEAR: ABNORMAL
CALCIUM SERPL-MCNC: 9.4 MG/DL (ref 8.3–10.6)
CHLORIDE SERPL-SCNC: 104 MMOL/L (ref 99–110)
CHOLEST SERPL-MCNC: 268 MG/DL (ref 0–199)
CO2 SERPL-SCNC: 17 MMOL/L (ref 21–32)
CREAT SERPL-MCNC: 0.9 MG/DL (ref 0.9–1.3)
DEPRECATED RDW RBC AUTO: 14.7 % (ref 12.4–15.4)
EOSINOPHIL # BLD: 0.4 K/UL (ref 0–0.6)
EOSINOPHIL NFR BLD: 5 %
GFR SERPLBLD CREATININE-BSD FMLA CKD-EPI: >90 ML/MIN/{1.73_M2}
GLUCOSE SERPL-MCNC: 83 MG/DL (ref 70–99)
HCT VFR BLD AUTO: 43.5 % (ref 40.5–52.5)
HDLC SERPL-MCNC: 71 MG/DL (ref 40–60)
HGB BLD-MCNC: 14.6 G/DL (ref 13.5–17.5)
LDL CHOLESTEROL: 181 MG/DL
LYMPHOCYTES # BLD: 1.5 K/UL (ref 1–5.1)
LYMPHOCYTES NFR BLD: 18 %
MCH RBC QN AUTO: 28.4 PG (ref 26–34)
MCHC RBC AUTO-ENTMCNC: 33.6 G/DL (ref 31–36)
MCV RBC AUTO: 84.5 FL (ref 80–100)
MONOCYTES # BLD: 0.6 K/UL (ref 0–1.3)
MONOCYTES NFR BLD: 8 %
NEUTROPHILS # BLD: 5.6 K/UL (ref 1.7–7.7)
NEUTROPHILS NFR BLD: 60 %
NEUTS BAND NFR BLD MANUAL: 9 % (ref 0–7)
OVALOCYTES BLD QL SMEAR: ABNORMAL
PLATELET # BLD AUTO: 252 K/UL (ref 135–450)
PLATELET BLD QL SMEAR: ADEQUATE
PMV BLD AUTO: 8.2 FL (ref 5–10.5)
POTASSIUM SERPL-SCNC: 4.4 MMOL/L (ref 3.5–5.1)
PROT SERPL-MCNC: 7.5 G/DL (ref 6.4–8.2)
PSA SERPL DL<=0.01 NG/ML-MCNC: 0.94 NG/ML (ref 0–4)
RBC # BLD AUTO: 5.15 M/UL (ref 4.2–5.9)
SLIDE REVIEW: ABNORMAL
SODIUM SERPL-SCNC: 141 MMOL/L (ref 136–145)
TRIGL SERPL-MCNC: 80 MG/DL (ref 0–150)
TSH SERPL DL<=0.005 MIU/L-ACNC: 0.86 UIU/ML (ref 0.27–4.2)
VLDLC SERPL CALC-MCNC: 16 MG/DL
WBC # BLD AUTO: 8.1 K/UL (ref 4–11)

## 2025-08-08 ENCOUNTER — HOSPITAL ENCOUNTER (OUTPATIENT)
Dept: CARDIOLOGY | Age: 50
Discharge: HOME | End: 2025-08-10
Attending: STUDENT IN AN ORGANIZED HEALTH CARE EDUCATION/TRAINING PROGRAM
Payer: COMMERCIAL

## 2025-08-08 VITALS
SYSTOLIC BLOOD PRESSURE: 120 MMHG | HEIGHT: 67 IN | WEIGHT: 179 LBS | DIASTOLIC BLOOD PRESSURE: 84 MMHG | BODY MASS INDEX: 28.09 KG/M2

## 2025-08-08 DIAGNOSIS — Z98.890 S/P MVR (MITRAL VALVE REPAIR): ICD-10-CM

## 2025-08-08 DIAGNOSIS — I42.9 CARDIOMYOPATHY, UNSPECIFIED TYPE (HCC): ICD-10-CM

## 2025-08-08 DIAGNOSIS — I50.22 HEART FAILURE WITH MILDLY REDUCED EJECTION FRACTION (HFMREF) (HCC): ICD-10-CM

## 2025-08-08 LAB
ECHO AO ROOT DIAM: 3.3 CM
ECHO AO ROOT INDEX: 1.71 CM/M2
ECHO BSA: 1.96 M2
ECHO LA AREA 2C: 12.1 CM2
ECHO LA AREA 4C: 13.3 CM2
ECHO LA DIAMETER INDEX: 2.07 CM/M2
ECHO LA DIAMETER: 4 CM
ECHO LA MAJOR AXIS: 5.1 CM
ECHO LA MINOR AXIS: 3.9 CM
ECHO LA TO AORTIC ROOT RATIO: 1.21
ECHO LA VOL MOD A2C: 31 ML (ref 18–58)
ECHO LA VOL MOD A4C: 28 ML (ref 18–58)
ECHO LA VOLUME INDEX MOD A2C: 16 ML/M2 (ref 16–34)
ECHO LA VOLUME INDEX MOD A4C: 15 ML/M2 (ref 16–34)
ECHO LV EDV 3D: 118 ML
ECHO LV EDV INDEX 3D: 61 ML/M2
ECHO LV EF PHYSICIAN: 40 %
ECHO LV EJECTION FRACTION 3D: 45 %
ECHO LV ESV 3D: 65 ML
ECHO LV ESV INDEX 3D: 34 ML/M2
ECHO LV FRACTIONAL SHORTENING: 23 % (ref 28–44)
ECHO LV GLOBAL LONGITUDINAL STRAIN (GLS): -11.9 %
ECHO LV GLOBAL LONGITUDINAL STRAIN (GLS): -12.6 %
ECHO LV GLOBAL LONGITUDINAL STRAIN (GLS): -13.7 %
ECHO LV GLOBAL LONGITUDINAL STRAIN (GLS): -16.7 %
ECHO LV INTERNAL DIMENSION DIASTOLE INDEX: 2.23 CM/M2
ECHO LV INTERNAL DIMENSION DIASTOLIC: 4.3 CM (ref 4.2–5.9)
ECHO LV INTERNAL DIMENSION SYSTOLIC INDEX: 1.71 CM/M2
ECHO LV INTERNAL DIMENSION SYSTOLIC: 3.3 CM
ECHO LV IVSD: 0.9 CM (ref 0.6–1)
ECHO LV MASS 2D: 114.2 G (ref 88–224)
ECHO LV MASS 3D INDEX: 65.8 G/M2
ECHO LV MASS 3D: 127 G
ECHO LV MASS INDEX 2D: 59.2 G/M2 (ref 49–115)
ECHO LV POSTERIOR WALL DIASTOLIC: 0.8 CM (ref 0.6–1)
ECHO LV RELATIVE WALL THICKNESS RATIO: 0.37
ECHO MV A VELOCITY: 1.36 M/S
ECHO MV E DECELERATION TIME (DT): 161 MS
ECHO MV E VELOCITY: 1.43 M/S
ECHO MV E/A RATIO: 1.05
ECHO MV MAX VELOCITY: 1.5 M/S
ECHO MV MEAN GRADIENT: 6 MMHG
ECHO MV MEAN VELOCITY: 1.2 M/S
ECHO MV PEAK GRADIENT: 10 MMHG
ECHO MV VTI: 31.8 CM
ECHO RA AREA 4C: 21.4 CM2
ECHO RA END SYSTOLIC VOLUME APICAL 4 CHAMBER INDEX BSA: 35 ML/M2
ECHO RA VOLUME: 68 ML
ECHO RV BASAL DIMENSION: 3.9 CM
ECHO RV FREE WALL PEAK S': 5.1 CM/S
ECHO RV LONGITUDINAL DIMENSION: 7.6 CM
ECHO RV MID DIMENSION: 3.5 CM
ECHO RV TAPSE: 1.1 CM (ref 1.7–?)

## 2025-08-08 PROCEDURE — 93306 TTE W/DOPPLER COMPLETE: CPT | Performed by: INTERNAL MEDICINE

## 2025-08-08 PROCEDURE — 93308 TTE F-UP OR LMTD: CPT

## (undated) DEVICE — SUTURE NONABSORBABLE MONOFILAMENT 4-0 RB-1 36 IN BLU PROLENE 8557H

## (undated) DEVICE — PLEDGET SURG W0.375XL0.062IN THK1.5MM WHT SFT PTFE RECT

## (undated) DEVICE — BOWL MED L 32OZ PLAS W/ MOLD GRAD EZ OPN PEEL PCH

## (undated) DEVICE — SUMP INTCARD SUCT AD 20FR PERICARD MAYO STYL FLX VERSATILE

## (undated) DEVICE — CANNULA PERF 32FR L12-15IN CONN 3/8IN VEN SGL STG MAL DLP

## (undated) DEVICE — CANNULA PERF L72.5CM DIA7.7X8.3MM FEM VEN MINIMALLY

## (undated) DEVICE — TUBING, SUCTION, 3/16" X 10', STRAIGHT: Brand: MEDLINE

## (undated) DEVICE — SUTURE PROL SZ 3-0 L36IN NONABSORBABLE BLU L26MM SH 1/2 CIR 8522H

## (undated) DEVICE — CATHETER PULM ART 5FR INFL 0.75CC L110CM BAL DIA8MM SGL WDG

## (undated) DEVICE — Device

## (undated) DEVICE — GLIDESHEATH SLENDER STAINLESS STEEL KIT: Brand: GLIDESHEATH SLENDER

## (undated) DEVICE — BLADE ES ELASTOMERIC COAT INSUL DURABLE BEND UPTO 90DEG

## (undated) DEVICE — SUTURE NONABSORBABLE MONOFILAMENT 5-0 C-1 1X24 IN PROLENE 8725H

## (undated) DEVICE — PROCEDURE SET TOP OUTLT 04257

## (undated) DEVICE — TAPE UMBILICAL W1/16XL30IN COTTON ROUND NONRADIOPAQUE

## (undated) DEVICE — COVER US PRB W12XL244CM SURGICAL INTRAOPERATIVE PLAS TAPR L

## (undated) DEVICE — CATH CTR VEN 3LUM INTRLNK INJ 9FRX11CM

## (undated) DEVICE — SUTURE ABSORBABLE BRAIDED 2-0 CT-1 27 IN UD VICRYL J259H

## (undated) DEVICE — SUTURE ETHIBOND EXCEL SZ 0 L18IN NONABSORBABLE GRN L36MM CT-1 CX21D

## (undated) DEVICE — EVERGRIP INSERT SET 86MM: Brand: FOGARTY EVERGRIP

## (undated) DEVICE — SURGIFOAM SPNG SZ 100

## (undated) DEVICE — TIP APPL TOP 2 SPRY

## (undated) DEVICE — 6FR JL3.5 CORDIS DIAG CATHETER 100CM

## (undated) DEVICE — SUTURE VICRYL SZ 4-0 L18IN ABSRB UD L19MM PS-2 3/8 CIR PRIM J496H

## (undated) DEVICE — COVER XR CASS W20XL41IN UNIV ADH STRP

## (undated) DEVICE — HOVERMATT HALF-MATT 34" W X 45" L

## (undated) DEVICE — 260 CM J TIP WIRE .035

## (undated) DEVICE — 1LYRTR 16FR10ML100%SILTMPS SNP: Brand: MEDLINE INDUSTRIES, INC.

## (undated) DEVICE — SUTURE SZ 7 L18IN NONABSORBABLE SIL CCS L48MM 1/2 CIR STRNM M655G

## (undated) DEVICE — PINNACLE INTRODUCER SHEATH: Brand: PINNACLE

## (undated) DEVICE — CATH LAB PACK: Brand: MEDLINE INDUSTRIES, INC.

## (undated) DEVICE — CANNULA PERF AD PED 24FR L12-15IN 1/4-3/8IN CONN SITE VEN

## (undated) DEVICE — LIQUIBAND RAPID ADHESIVE 36/CS 0.8ML: Brand: MEDLINE

## (undated) DEVICE — COR-KNOT® QUICK LOAD® SINGLES: Brand: COR-KNOT® QUICK LOAD®

## (undated) DEVICE — SOLUTION IRRIG 2000ML 0.9% SOD CHL USP UROMATIC PLAS CONT

## (undated) DEVICE — CANNULA ART 21FR L14IN VENT 3/8IN CONN SFT FLO ANG TIP W/

## (undated) DEVICE — GEL US 20GM NONIRRITATING OVERWRAPPED FILE PCH TRNSMIT

## (undated) DEVICE — SUTURE PROL 3-0 L36IN NONABSORBABLE BLU L26MM SH 1/2 CIR P8522

## (undated) DEVICE — MEDI-VAC NON-CONDUCTIVE SUCTION TUBING: Brand: CARDINAL HEALTH

## (undated) DEVICE — ASPIRATION/ANTICOAGULATION SET: Brand: HAEMONETICS CELL SAVER SYSTEM

## (undated) DEVICE — SYSTEM BLD DEL

## (undated) DEVICE — STERILE POLYISOPRENE POWDER-FREE SURGICAL GLOVES: Brand: PROTEXIS

## (undated) DEVICE — KIT INSRT AD PED VEN GWIRE L180CM DIA0038IN STP VES DIL 8FR

## (undated) DEVICE — CATHETER L HRT VENT SIL 16 IN OVERALL LEN 20FR N VENT CONN

## (undated) DEVICE — SUTURE VICRYL SZ 0 L27IN ABSRB UD L36MM CT-1 1/2 CIR J260H

## (undated) DEVICE — KIT ART LN 20GA L12CM FEP RADPQ 0.025X13.75IN SPR GWIRE

## (undated) DEVICE — CONNECTOR STRL 3/8X3/8X1/2IN CLR Y PARA TMP

## (undated) DEVICE — LEAD PACE L475MM CHNL A OR V MYOCARDIAL STEROID ELUT SIL

## (undated) DEVICE — CANNULA ART 21FR L145IN VENT CONN SFT FLOW EXT

## (undated) DEVICE — SUTURE VICRYL + SZ 0 L27IN ABSRB UD CT-1 L36MM 1/2 CIR TAPR VCP260H

## (undated) DEVICE — RETRACTOR SURG INSRT SUT HLD OCTOBASE

## (undated) DEVICE — KIT CATH 18GA L6IN FEM ART LN W/ INTEGR SUT WNG 0.25X17

## (undated) DEVICE — SUTURE ETHIBOND N ABSRB BRAIDED 2-0 V-5 30 IN 17 MM WHT EXCEL

## (undated) DEVICE — NEEDLE HYPO 18GA L1.5IN PNK POLYPR HUB S STL REG BVL STR

## (undated) DEVICE — CONNECTOR PERF W3/8XH3/8IN BASE STR W/O LUERLOCK FOR CUST

## (undated) DEVICE — CATHETER THORACENTHESIS 9 FRX20 IN EYES TOP

## (undated) DEVICE — KIT APPL 11:1 PROC W/ FIBRIJET MED CUP APPL TIP TY

## (undated) DEVICE — GAUZE,SPONGE,4"X4",16PLY,XRAY,STRL,LF: Brand: MEDLINE

## (undated) DEVICE — VESSEL LOOPS,MAXI, BLUE: Brand: DEVON

## (undated) DEVICE — MYOCARDIAL TEMPERATURE PROBE, 18MM NEEDLE: Brand: SURGE CARDIOVASCULAR

## (undated) DEVICE — PUMP SUC IRR TBNG L10FT W/ HNDPC ASSEMB STRYKEFLOW 2

## (undated) DEVICE — SENSOR PLSE OXMTR AD CBL L36IN ADH FRM FIT SPO2 DISP

## (undated) DEVICE — SUTURE ETHIBOND EXCEL SZ 2-0 L36IN NONABSORBABLE GRN L26MM SH X523H

## (undated) DEVICE — TR BAND RADIAL ARTERY COMPRESSION DEVICE: Brand: TR BAND

## (undated) DEVICE — SUTURE VICRYL SZ 3-0 L27IN ABSRB UD L36MM CT-1 1/2 CIR J258H

## (undated) DEVICE — TIP APPL 20 GAX5 CM 2 CANN MALL

## (undated) DEVICE — 3M™ IOBAN™ 2 ANTIMICROBIAL INCISE DRAPE 6650EZ: Brand: IOBAN™ 2

## (undated) DEVICE — COR-KNOT MINI® COMBO KITBASE PACKAGE TYPE - KITEACH STERILE PACKAGE KIT CONTAINS (2) SINGLE PATIENT USE COR-KNOT MINI® DEVICES AND (12) COR-KNOT® QUICK LOADS®.: Brand: COR-KNOT MINI®

## (undated) DEVICE — AGENT TOP HEMSTAT FOAM DISC STATSEAL L 10 - 14FR

## (undated) DEVICE — LEAD PACE 2.6FR L50CM UPLR TEMP ATR NONSTEROID ELUT PIN

## (undated) DEVICE — SPONGE LAP W18XL18IN WHT COT 4 PLY FLD STRUNG RADPQ DISP ST 2 PER PACK

## (undated) DEVICE — DRAIN,WOUND,ROUND,24FR,5/16",FULL-FLUTED: Brand: MEDLINE

## (undated) DEVICE — TUBING PERF PMP L8FT OD3/8IN ID3/32IN MED PRECUT CAPPED

## (undated) DEVICE — DRAIN SURG SGL COLL PT TB FOR ATS BG OASIS

## (undated) DEVICE — JACKSON-PRATT 100CC BULB RESERVOIR: Brand: CARDINAL HEALTH

## (undated) DEVICE — 6FR JR4 CORDIS  DIAG CATHETER 100CM

## (undated) DEVICE — SUTURE PROL 4-0 L36IN NONABSORBABLE BLU V-7 L26MM 1/2 CIR 8975H